# Patient Record
Sex: FEMALE | Race: WHITE | Employment: FULL TIME | URBAN - METROPOLITAN AREA
[De-identification: names, ages, dates, MRNs, and addresses within clinical notes are randomized per-mention and may not be internally consistent; named-entity substitution may affect disease eponyms.]

---

## 2017-08-29 ENCOUNTER — OFFICE VISIT (OUTPATIENT)
Dept: FAMILY MEDICINE CLINIC | Age: 47
End: 2017-08-29
Payer: COMMERCIAL

## 2017-08-29 VITALS
HEIGHT: 65 IN | DIASTOLIC BLOOD PRESSURE: 80 MMHG | WEIGHT: 130 LBS | BODY MASS INDEX: 21.66 KG/M2 | SYSTOLIC BLOOD PRESSURE: 120 MMHG

## 2017-08-29 DIAGNOSIS — R13.19 OTHER DYSPHAGIA: Primary | ICD-10-CM

## 2017-08-29 DIAGNOSIS — F41.0 SEVERE ANXIETY WITH PANIC: ICD-10-CM

## 2017-08-29 PROCEDURE — 99214 OFFICE O/P EST MOD 30 MIN: CPT | Performed by: FAMILY MEDICINE

## 2017-08-29 RX ORDER — VENLAFAXINE HYDROCHLORIDE 75 MG/1
75 CAPSULE, EXTENDED RELEASE ORAL DAILY
Qty: 90 CAPSULE | Refills: 1 | Status: SHIPPED | OUTPATIENT
Start: 2017-08-29 | End: 2017-09-05 | Stop reason: SINTOL

## 2017-08-29 RX ORDER — LORAZEPAM 0.5 MG/1
0.5 TABLET ORAL EVERY 8 HOURS PRN
Qty: 30 TABLET | Refills: 1 | Status: SHIPPED | OUTPATIENT
Start: 2017-08-29 | End: 2017-09-28

## 2017-08-29 ASSESSMENT — ENCOUNTER SYMPTOMS
GASTROINTESTINAL NEGATIVE: 1
RESPIRATORY NEGATIVE: 1

## 2017-09-05 ENCOUNTER — TELEPHONE (OUTPATIENT)
Dept: FAMILY MEDICINE CLINIC | Age: 47
End: 2017-09-05

## 2017-09-05 DIAGNOSIS — F41.9 ANXIETY: ICD-10-CM

## 2017-09-05 RX ORDER — PAROXETINE HYDROCHLORIDE 20 MG/1
20 TABLET, FILM COATED ORAL DAILY
Qty: 30 TABLET | Refills: 3 | Status: SHIPPED | OUTPATIENT
Start: 2017-09-05 | End: 2018-01-22 | Stop reason: SDUPTHER

## 2017-09-12 ENCOUNTER — OFFICE VISIT (OUTPATIENT)
Dept: FAMILY MEDICINE CLINIC | Age: 47
End: 2017-09-12
Payer: COMMERCIAL

## 2017-09-12 VITALS
WEIGHT: 139 LBS | DIASTOLIC BLOOD PRESSURE: 60 MMHG | HEIGHT: 65 IN | BODY MASS INDEX: 23.16 KG/M2 | SYSTOLIC BLOOD PRESSURE: 100 MMHG

## 2017-09-12 DIAGNOSIS — F41.0 SEVERE ANXIETY WITH PANIC: Primary | ICD-10-CM

## 2017-09-12 PROCEDURE — 99213 OFFICE O/P EST LOW 20 MIN: CPT | Performed by: FAMILY MEDICINE

## 2017-09-12 RX ORDER — QUETIAPINE FUMARATE 50 MG/1
50 TABLET, FILM COATED ORAL NIGHTLY
Qty: 30 TABLET | Refills: 3 | Status: SHIPPED | OUTPATIENT
Start: 2017-09-12 | End: 2017-10-12 | Stop reason: SDUPTHER

## 2017-09-12 ASSESSMENT — PATIENT HEALTH QUESTIONNAIRE - PHQ9
2. FEELING DOWN, DEPRESSED OR HOPELESS: 0
1. LITTLE INTEREST OR PLEASURE IN DOING THINGS: 0
SUM OF ALL RESPONSES TO PHQ QUESTIONS 1-9: 0
SUM OF ALL RESPONSES TO PHQ9 QUESTIONS 1 & 2: 0

## 2017-09-13 ASSESSMENT — ENCOUNTER SYMPTOMS
GASTROINTESTINAL NEGATIVE: 1
RESPIRATORY NEGATIVE: 1

## 2017-10-12 ENCOUNTER — OFFICE VISIT (OUTPATIENT)
Dept: FAMILY MEDICINE CLINIC | Age: 47
End: 2017-10-12
Payer: COMMERCIAL

## 2017-10-12 VITALS
HEIGHT: 65 IN | WEIGHT: 141 LBS | DIASTOLIC BLOOD PRESSURE: 70 MMHG | SYSTOLIC BLOOD PRESSURE: 110 MMHG | BODY MASS INDEX: 23.49 KG/M2

## 2017-10-12 DIAGNOSIS — F41.0 SEVERE ANXIETY WITH PANIC: Primary | ICD-10-CM

## 2017-10-12 PROCEDURE — 99213 OFFICE O/P EST LOW 20 MIN: CPT | Performed by: FAMILY MEDICINE

## 2017-10-12 RX ORDER — QUETIAPINE FUMARATE 100 MG/1
100 TABLET, FILM COATED ORAL NIGHTLY
Qty: 30 TABLET | Refills: 2 | Status: SHIPPED | OUTPATIENT
Start: 2017-10-12 | End: 2018-05-09 | Stop reason: SDUPTHER

## 2017-10-12 RX ORDER — LORAZEPAM 0.5 MG/1
TABLET ORAL
Refills: 1 | COMMUNITY
Start: 2017-09-29 | End: 2017-10-12 | Stop reason: SDUPTHER

## 2017-10-12 RX ORDER — CLONAZEPAM 0.5 MG/1
.5-1 TABLET ORAL DAILY PRN
Qty: 60 TABLET | Refills: 1 | Status: SHIPPED | OUTPATIENT
Start: 2017-10-12 | End: 2017-12-19 | Stop reason: SDUPTHER

## 2017-10-12 RX ORDER — LORAZEPAM 1 MG/1
1 TABLET ORAL EVERY 8 HOURS PRN
Qty: 60 TABLET | Refills: 1 | Status: SHIPPED | OUTPATIENT
Start: 2017-10-12 | End: 2017-12-19 | Stop reason: SDUPTHER

## 2017-10-12 NOTE — PROGRESS NOTES
Name: Ying Dominguez  : 1970         Chief Complaint:     Chief Complaint   Patient presents with    Anxiety     improvement with seroquel and paxil. History of Present Illness:      Ying Dominguez is a 52 y.o.  female who presents with Anxiety (improvement with seroquel and paxil.)      HPI     F/u anxiety. On paxil 20 daily and seroquel 50 mg qhs and is overall doing a little better. Able to swallow now. Continues to have times of panic both during the night and day. For nighttime symptoms she tried seroquel plus ativan at varied doses, and what helped most was 1 seroquel and 2 ativan. When she takes ativan during the day, it takes a long time to work and then still feels dread of wanting to escape, not wanting anyone to know about her problems. During the day finding herself counting things, which helps a little because it's a distraction. Throughout her life has used distraction techniques, which she learned as a child d/t her mom's severe anxiety that instilled panic in pt and siblings. Past Medical History:     Past Medical History:   Diagnosis Date    Anxiety         Past Surgical History:     No past surgical history on file. Medications:       Prior to Admission medications    Medication Sig Start Date End Date Taking? Authorizing Provider   QUEtiapine (SEROQUEL) 100 MG tablet Take 1 tablet by mouth nightly 10/12/17  Yes Fartun Magdaleno DO   LORazepam (ATIVAN) 1 MG tablet Take 1 tablet by mouth every 8 hours as needed for Anxiety 10/12/17  Yes Fartun Magdaleno DO   clonazePAM (KLONOPIN) 0.5 MG tablet Take 1-2 tablets by mouth daily as needed for Anxiety 10/12/17  Yes Fartun Magdaleno DO   PARoxetine (PAXIL) 20 MG tablet Take 1 tablet by mouth daily 17   Fartun Magdaleno DO        Allergies:       Effexor [venlafaxine]    Social History:     Tobacco:    reports that she has been smoking. She has been smoking about 0.50 packs per day.  She has never used smokeless 1     Sig: Take 1-2 tablets by mouth daily as needed for Anxiety       Patient Instructions     SURVEY:    You may be receiving a survey from [employer] regarding your visit today. Please complete the survey to enable us to provide the highest quality of care to you and your family. If you cannot score us as very good on any question, please call the office to discuss how we could have made your experience exceptional.     Thank you. Alberta received counseling on the following healthy behaviors: medication adherence  Reviewed prior labs and health maintenance. Continue current medications, diet and exercise. Discussed use, benefit, and side effects of prescribed medications. Barriers to medication compliance addressed. Patient given educational materials - see patient instructions. All patient questions answered. Patient voiced understanding.        Electronically signed by Phuong Shaikh DO on 10/13/2017 at 7:13 PM

## 2017-10-12 NOTE — PATIENT INSTRUCTIONS
SURVEY:    You may be receiving a survey from [employer] regarding your visit today. Please complete the survey to enable us to provide the highest quality of care to you and your family. If you cannot score us as very good on any question, please call the office to discuss how we could have made your experience exceptional.     Thank you.

## 2017-10-13 ASSESSMENT — ENCOUNTER SYMPTOMS: GASTROINTESTINAL NEGATIVE: 1

## 2017-11-13 ENCOUNTER — OFFICE VISIT (OUTPATIENT)
Dept: FAMILY MEDICINE CLINIC | Age: 47
End: 2017-11-13
Payer: COMMERCIAL

## 2017-11-13 VITALS
HEART RATE: 86 BPM | SYSTOLIC BLOOD PRESSURE: 110 MMHG | BODY MASS INDEX: 22.82 KG/M2 | WEIGHT: 142 LBS | DIASTOLIC BLOOD PRESSURE: 70 MMHG | HEIGHT: 66 IN

## 2017-11-13 DIAGNOSIS — F41.0 SEVERE ANXIETY WITH PANIC: Primary | ICD-10-CM

## 2017-11-13 PROCEDURE — 99213 OFFICE O/P EST LOW 20 MIN: CPT | Performed by: FAMILY MEDICINE

## 2017-11-13 RX ORDER — PAROXETINE HYDROCHLORIDE 20 MG/1
20 TABLET, FILM COATED ORAL DAILY
Qty: 30 TABLET | Refills: 3 | Status: CANCELLED | OUTPATIENT
Start: 2017-11-13

## 2017-11-13 RX ORDER — QUETIAPINE FUMARATE 100 MG/1
100 TABLET, FILM COATED ORAL NIGHTLY
Qty: 30 TABLET | Refills: 2 | Status: CANCELLED | OUTPATIENT
Start: 2017-11-13

## 2017-11-13 ASSESSMENT — ENCOUNTER SYMPTOMS
GASTROINTESTINAL NEGATIVE: 1
RESPIRATORY NEGATIVE: 1

## 2017-11-13 NOTE — PROGRESS NOTES
110/70   Pulse 86   Ht 5' 6\" (1.676 m)   Wt 142 lb (64.4 kg)   LMP 04/05/2014   BMI 22.92 kg/m²   Physical Exam   Constitutional: She is oriented to person, place, and time. She appears well-developed and well-nourished. No distress. Well-groomed, dressed for work, hair & makeup done   Pulmonary/Chest: Effort normal.   Neurological: She is alert and oriented to person, place, and time. Skin: Skin is warm and dry. Psychiatric: She has a normal mood and affect. Judgment normal.   Nursing note and vitals reviewed. Data:     Lab Results   Component Value Date     04/15/2014    K 3.9 04/15/2014     04/15/2014    CO2 26 04/15/2014    BUN 10 04/15/2014    CREATININE 0.72 04/15/2014    GLUCOSE 91 04/15/2014     Lab Results   Component Value Date    WBC 8.9 04/15/2014    RBC 4.78 04/15/2014    HGB 14.5 04/15/2014    HCT 42.5 04/15/2014    MCV 88.9 04/15/2014    MCH 30.4 04/15/2014    MCHC 34.2 04/15/2014    RDW 12.8 04/15/2014     04/15/2014    MPV NOT REPORTED 04/15/2014     Lab Results   Component Value Date    TSH 0.42 04/15/2014     No results found for: CHOL, HDL, PSA, LABA1C      Assessment & Plan:       1. Severe anxiety with panic     controlled on paxil, seroquel, klonopin, ativan. Cont at current doses for now. F/u 3 mos. Requested Prescriptions      No prescriptions requested or ordered in this encounter         There are no Patient Instructions on file for this visit. Alberta received counseling on the following healthy behaviors: medication adherence  Reviewed prior labs and health maintenance. Continue current medications, diet and exercise. Discussed use, benefit, and side effects of prescribed medications. Barriers to medication compliance addressed. Patient given educational materials - see patient instructions. All patient questions answered. Patient voiced understanding.          Electronically signed by Dayna Hernandez DO on 11/13/2017 at 11:00 PM

## 2017-12-19 RX ORDER — LORAZEPAM 1 MG/1
1 TABLET ORAL EVERY 8 HOURS PRN
Qty: 60 TABLET | Refills: 1 | Status: SHIPPED | OUTPATIENT
Start: 2017-12-19 | End: 2018-02-13

## 2017-12-19 RX ORDER — CLONAZEPAM 0.5 MG/1
.5-1 TABLET ORAL DAILY PRN
Qty: 60 TABLET | Refills: 1 | Status: SHIPPED | OUTPATIENT
Start: 2017-12-19 | End: 2018-02-26 | Stop reason: SDUPTHER

## 2018-01-22 DIAGNOSIS — F41.9 ANXIETY: ICD-10-CM

## 2018-01-22 RX ORDER — PAROXETINE HYDROCHLORIDE 20 MG/1
20 TABLET, FILM COATED ORAL DAILY
Qty: 90 TABLET | Refills: 1 | Status: SHIPPED | OUTPATIENT
Start: 2018-01-22 | End: 2018-05-09 | Stop reason: SDUPTHER

## 2018-02-13 ENCOUNTER — OFFICE VISIT (OUTPATIENT)
Dept: FAMILY MEDICINE CLINIC | Age: 48
End: 2018-02-13
Payer: COMMERCIAL

## 2018-02-13 VITALS
BODY MASS INDEX: 24.11 KG/M2 | WEIGHT: 150 LBS | SYSTOLIC BLOOD PRESSURE: 126 MMHG | DIASTOLIC BLOOD PRESSURE: 78 MMHG | HEIGHT: 66 IN

## 2018-02-13 DIAGNOSIS — T50.905A WEIGHT GAIN DUE TO MEDICATION: ICD-10-CM

## 2018-02-13 DIAGNOSIS — F41.0 SEVERE ANXIETY WITH PANIC: Primary | ICD-10-CM

## 2018-02-13 DIAGNOSIS — R63.5 WEIGHT GAIN DUE TO MEDICATION: ICD-10-CM

## 2018-02-13 PROCEDURE — 99213 OFFICE O/P EST LOW 20 MIN: CPT | Performed by: FAMILY MEDICINE

## 2018-02-13 RX ORDER — ALPRAZOLAM 1 MG/1
1 TABLET ORAL 3 TIMES DAILY PRN
Qty: 60 TABLET | Refills: 1 | Status: SHIPPED | OUTPATIENT
Start: 2018-02-13 | End: 2018-04-03 | Stop reason: ALTCHOICE

## 2018-02-13 ASSESSMENT — ENCOUNTER SYMPTOMS: GASTROINTESTINAL NEGATIVE: 1

## 2018-02-26 RX ORDER — CLONAZEPAM 0.5 MG/1
.5-1 TABLET ORAL DAILY PRN
Qty: 60 TABLET | Refills: 1 | Status: SHIPPED | OUTPATIENT
Start: 2018-02-26 | End: 2018-05-01 | Stop reason: SDUPTHER

## 2018-04-03 ENCOUNTER — TELEPHONE (OUTPATIENT)
Dept: FAMILY MEDICINE CLINIC | Age: 48
End: 2018-04-03

## 2018-04-03 ENCOUNTER — APPOINTMENT (OUTPATIENT)
Dept: CT IMAGING | Age: 48
End: 2018-04-03
Payer: COMMERCIAL

## 2018-04-03 ENCOUNTER — HOSPITAL ENCOUNTER (EMERGENCY)
Age: 48
Discharge: HOME | End: 2018-04-03
Attending: FAMILY MEDICINE
Payer: COMMERCIAL

## 2018-04-03 VITALS
HEART RATE: 63 BPM | TEMPERATURE: 98.1 F | OXYGEN SATURATION: 99 % | RESPIRATION RATE: 16 BRPM | HEIGHT: 66 IN | WEIGHT: 140 LBS | BODY MASS INDEX: 22.5 KG/M2 | SYSTOLIC BLOOD PRESSURE: 111 MMHG | DIASTOLIC BLOOD PRESSURE: 77 MMHG

## 2018-04-03 DIAGNOSIS — R42 DIZZINESS: Primary | ICD-10-CM

## 2018-04-03 DIAGNOSIS — M47.812 CERVICAL ARTHRITIS: ICD-10-CM

## 2018-04-03 LAB
ABSOLUTE EOS #: 0.1 K/UL (ref 0–0.4)
ABSOLUTE IMMATURE GRANULOCYTE: ABNORMAL K/UL (ref 0–0.3)
ABSOLUTE LYMPH #: 2.3 K/UL (ref 1–4.8)
ABSOLUTE MONO #: 0.3 K/UL (ref 0–1)
ANION GAP SERPL CALCULATED.3IONS-SCNC: 14 MMOL/L (ref 9–17)
BASOPHILS # BLD: 0 % (ref 0–2)
BASOPHILS ABSOLUTE: 0 K/UL (ref 0–0.2)
BUN BLDV-MCNC: 7 MG/DL (ref 6–20)
BUN/CREAT BLD: 9 (ref 9–20)
CALCIUM SERPL-MCNC: 9.7 MG/DL (ref 8.6–10.4)
CHLORIDE BLD-SCNC: 102 MMOL/L (ref 98–107)
CO2: 24 MMOL/L (ref 20–31)
CREAT SERPL-MCNC: 0.8 MG/DL (ref 0.5–0.9)
DIFFERENTIAL TYPE: YES
EKG ATRIAL RATE: 83 BPM
EKG P AXIS: 73 DEGREES
EKG P-R INTERVAL: 138 MS
EKG Q-T INTERVAL: 394 MS
EKG QRS DURATION: 136 MS
EKG QTC CALCULATION (BAZETT): 462 MS
EKG R AXIS: 77 DEGREES
EKG T AXIS: -11 DEGREES
EKG VENTRICULAR RATE: 83 BPM
EOSINOPHILS RELATIVE PERCENT: 1 % (ref 0–5)
GFR AFRICAN AMERICAN: >60 ML/MIN
GFR NON-AFRICAN AMERICAN: >60 ML/MIN
GFR SERPL CREATININE-BSD FRML MDRD: ABNORMAL ML/MIN/{1.73_M2}
GFR SERPL CREATININE-BSD FRML MDRD: ABNORMAL ML/MIN/{1.73_M2}
GLUCOSE BLD-MCNC: 101 MG/DL (ref 70–99)
HCT VFR BLD CALC: 47 % (ref 36–46)
HEMOGLOBIN: 15.8 G/DL (ref 12–16)
IMMATURE GRANULOCYTES: ABNORMAL %
LYMPHOCYTES # BLD: 34 % (ref 15–40)
MAGNESIUM: 2 MG/DL (ref 1.6–2.6)
MCH RBC QN AUTO: 29.7 PG (ref 26–34)
MCHC RBC AUTO-ENTMCNC: 33.7 G/DL (ref 31–37)
MCV RBC AUTO: 88.2 FL (ref 80–100)
MONOCYTES # BLD: 4 % (ref 4–8)
NRBC AUTOMATED: ABNORMAL PER 100 WBC
PDW BLD-RTO: 12.7 % (ref 12.1–15.2)
PLATELET # BLD: 326 K/UL (ref 140–450)
PLATELET ESTIMATE: ABNORMAL
PMV BLD AUTO: ABNORMAL FL (ref 6–12)
POTASSIUM SERPL-SCNC: 4 MMOL/L (ref 3.7–5.3)
RBC # BLD: 5.33 M/UL (ref 4–5.2)
RBC # BLD: ABNORMAL 10*6/UL
SEG NEUTROPHILS: 61 % (ref 47–75)
SEGMENTED NEUTROPHILS ABSOLUTE COUNT: 4 K/UL (ref 2.5–7)
SODIUM BLD-SCNC: 140 MMOL/L (ref 135–144)
TROPONIN INTERP: NORMAL
TROPONIN T: <0.03 NG/ML
TSH SERPL DL<=0.05 MIU/L-ACNC: 0.56 MIU/L (ref 0.3–5)
WBC # BLD: 6.6 K/UL (ref 3.5–11)
WBC # BLD: ABNORMAL 10*3/UL

## 2018-04-03 PROCEDURE — 93005 ELECTROCARDIOGRAM TRACING: CPT

## 2018-04-03 PROCEDURE — 70450 CT HEAD/BRAIN W/O DYE: CPT

## 2018-04-03 PROCEDURE — 72125 CT NECK SPINE W/O DYE: CPT

## 2018-04-03 PROCEDURE — 84443 ASSAY THYROID STIM HORMONE: CPT

## 2018-04-03 PROCEDURE — 99284 EMERGENCY DEPT VISIT MOD MDM: CPT

## 2018-04-03 PROCEDURE — 6370000000 HC RX 637 (ALT 250 FOR IP): Performed by: FAMILY MEDICINE

## 2018-04-03 PROCEDURE — 83735 ASSAY OF MAGNESIUM: CPT

## 2018-04-03 PROCEDURE — 80048 BASIC METABOLIC PNL TOTAL CA: CPT

## 2018-04-03 PROCEDURE — 84484 ASSAY OF TROPONIN QUANT: CPT

## 2018-04-03 PROCEDURE — 36415 COLL VENOUS BLD VENIPUNCTURE: CPT

## 2018-04-03 PROCEDURE — 85025 COMPLETE CBC W/AUTO DIFF WBC: CPT

## 2018-04-03 RX ORDER — MECLIZINE HCL 12.5 MG/1
25 TABLET ORAL ONCE
Status: COMPLETED | OUTPATIENT
Start: 2018-04-03 | End: 2018-04-03

## 2018-04-03 RX ORDER — MECLIZINE HYDROCHLORIDE 25 MG/1
25 TABLET ORAL 3 TIMES DAILY PRN
Qty: 60 TABLET | Refills: 0 | Status: SHIPPED | OUTPATIENT
Start: 2018-04-03 | End: 2018-04-13

## 2018-04-03 RX ORDER — NAPROXEN 500 MG/1
500 TABLET ORAL ONCE
Status: COMPLETED | OUTPATIENT
Start: 2018-04-03 | End: 2018-04-03

## 2018-04-03 RX ORDER — NAPROXEN 500 MG/1
500 TABLET ORAL 2 TIMES DAILY WITH MEALS
Status: DISCONTINUED | OUTPATIENT
Start: 2018-04-03 | End: 2018-04-03

## 2018-04-03 RX ADMIN — MECLIZINE 25 MG: 12.5 TABLET ORAL at 14:31

## 2018-04-03 RX ADMIN — NAPROXEN 500 MG: 500 TABLET, DELAYED RELEASE ORAL at 13:41

## 2018-04-03 ASSESSMENT — PAIN SCALES - GENERAL: PAINLEVEL_OUTOF10: 4

## 2018-04-04 ENCOUNTER — OFFICE VISIT (OUTPATIENT)
Dept: FAMILY MEDICINE CLINIC | Age: 48
End: 2018-04-04
Payer: COMMERCIAL

## 2018-04-04 ENCOUNTER — HOSPITAL ENCOUNTER (OUTPATIENT)
Age: 48
Discharge: HOME | End: 2018-04-04
Payer: COMMERCIAL

## 2018-04-04 VITALS
OXYGEN SATURATION: 98 % | HEART RATE: 87 BPM | BODY MASS INDEX: 23.95 KG/M2 | SYSTOLIC BLOOD PRESSURE: 110 MMHG | WEIGHT: 149 LBS | HEIGHT: 66 IN | DIASTOLIC BLOOD PRESSURE: 74 MMHG

## 2018-04-04 DIAGNOSIS — I45.10 RBBB: ICD-10-CM

## 2018-04-04 DIAGNOSIS — R51.9 NONINTRACTABLE EPISODIC HEADACHE, UNSPECIFIED HEADACHE TYPE: ICD-10-CM

## 2018-04-04 DIAGNOSIS — M25.50 POLYARTHRALGIA: ICD-10-CM

## 2018-04-04 DIAGNOSIS — Z13.6 SCREENING FOR CARDIOVASCULAR CONDITION: ICD-10-CM

## 2018-04-04 DIAGNOSIS — R42 DIZZINESS: Primary | ICD-10-CM

## 2018-04-04 DIAGNOSIS — R42 DIZZINESS: ICD-10-CM

## 2018-04-04 LAB
C-REACTIVE PROTEIN: <0.3 MG/L (ref 0–5)
CHOLESTEROL/HDL RATIO: 3.4
CHOLESTEROL: 233 MG/DL
HDLC SERPL-MCNC: 69 MG/DL
LDL CHOLESTEROL: 139 MG/DL (ref 0–130)
MAGNESIUM: 2.1 MG/DL (ref 1.6–2.6)
RHEUMATOID FACTOR: <10 IU/ML
SEDIMENTATION RATE, ERYTHROCYTE: 5 MM (ref 0–30)
TRIGL SERPL-MCNC: 125 MG/DL
VLDLC SERPL CALC-MCNC: ABNORMAL MG/DL (ref 1–30)

## 2018-04-04 PROCEDURE — 80061 LIPID PANEL: CPT

## 2018-04-04 PROCEDURE — 86038 ANTINUCLEAR ANTIBODIES: CPT

## 2018-04-04 PROCEDURE — 86140 C-REACTIVE PROTEIN: CPT

## 2018-04-04 PROCEDURE — 86431 RHEUMATOID FACTOR QUANT: CPT

## 2018-04-04 PROCEDURE — 36415 COLL VENOUS BLD VENIPUNCTURE: CPT

## 2018-04-04 PROCEDURE — 83735 ASSAY OF MAGNESIUM: CPT

## 2018-04-04 PROCEDURE — 85651 RBC SED RATE NONAUTOMATED: CPT

## 2018-04-04 PROCEDURE — 99214 OFFICE O/P EST MOD 30 MIN: CPT | Performed by: FAMILY MEDICINE

## 2018-04-04 ASSESSMENT — ENCOUNTER SYMPTOMS: RESPIRATORY NEGATIVE: 1

## 2018-04-04 ASSESSMENT — PATIENT HEALTH QUESTIONNAIRE - PHQ9
2. FEELING DOWN, DEPRESSED OR HOPELESS: 0
SUM OF ALL RESPONSES TO PHQ QUESTIONS 1-9: 0
SUM OF ALL RESPONSES TO PHQ9 QUESTIONS 1 & 2: 0
1. LITTLE INTEREST OR PLEASURE IN DOING THINGS: 0

## 2018-04-05 ENCOUNTER — TELEPHONE (OUTPATIENT)
Dept: FAMILY MEDICINE CLINIC | Age: 48
End: 2018-04-05

## 2018-04-05 LAB — ANTI-NUCLEAR ANTIBODY (ANA): NEGATIVE

## 2018-04-09 ENCOUNTER — TELEPHONE (OUTPATIENT)
Dept: FAMILY MEDICINE CLINIC | Age: 48
End: 2018-04-09

## 2018-04-09 ENCOUNTER — HOSPITAL ENCOUNTER (OUTPATIENT)
Dept: NON INVASIVE DIAGNOSTICS | Age: 48
Discharge: HOME | End: 2018-04-09
Payer: COMMERCIAL

## 2018-04-09 DIAGNOSIS — I45.10 RBBB: ICD-10-CM

## 2018-04-09 LAB
LV EF: 60 %
LVEF MODALITY: NORMAL

## 2018-04-09 PROCEDURE — 93306 TTE W/DOPPLER COMPLETE: CPT

## 2018-04-09 RX ORDER — LORAZEPAM 1 MG/1
1 TABLET ORAL EVERY 8 HOURS PRN
Qty: 60 TABLET | Refills: 1 | Status: SHIPPED | OUTPATIENT
Start: 2018-04-09 | End: 2018-06-22 | Stop reason: SDUPTHER

## 2018-04-10 ENCOUNTER — TELEPHONE (OUTPATIENT)
Dept: FAMILY MEDICINE CLINIC | Age: 48
End: 2018-04-10

## 2018-04-10 DIAGNOSIS — I45.10 RBBB: Primary | ICD-10-CM

## 2018-04-12 ENCOUNTER — TELEPHONE (OUTPATIENT)
Dept: CARDIOLOGY CLINIC | Age: 48
End: 2018-04-12

## 2018-04-12 DIAGNOSIS — R42 DIZZINESS: ICD-10-CM

## 2018-04-12 DIAGNOSIS — R42 LIGHTHEADEDNESS: ICD-10-CM

## 2018-04-12 DIAGNOSIS — R94.31 ABNORMAL EKG: Primary | ICD-10-CM

## 2018-04-18 ENCOUNTER — HOSPITAL ENCOUNTER (OUTPATIENT)
Dept: GENERAL RADIOLOGY | Age: 48
Discharge: HOME | End: 2018-04-20
Payer: COMMERCIAL

## 2018-04-18 ENCOUNTER — HOSPITAL ENCOUNTER (OUTPATIENT)
Age: 48
Discharge: HOME | End: 2018-04-20
Payer: COMMERCIAL

## 2018-04-18 DIAGNOSIS — R42 DIZZINESS: ICD-10-CM

## 2018-04-18 DIAGNOSIS — R94.31 ABNORMAL EKG: ICD-10-CM

## 2018-04-18 DIAGNOSIS — R42 LIGHTHEADEDNESS: ICD-10-CM

## 2018-04-18 PROCEDURE — 71046 X-RAY EXAM CHEST 2 VIEWS: CPT

## 2018-04-20 ENCOUNTER — OFFICE VISIT (OUTPATIENT)
Dept: CARDIOLOGY CLINIC | Age: 48
End: 2018-04-20
Payer: COMMERCIAL

## 2018-04-20 VITALS
WEIGHT: 152 LBS | BODY MASS INDEX: 24.53 KG/M2 | SYSTOLIC BLOOD PRESSURE: 120 MMHG | OXYGEN SATURATION: 99 % | HEART RATE: 90 BPM | DIASTOLIC BLOOD PRESSURE: 80 MMHG

## 2018-04-20 DIAGNOSIS — R07.89 CHEST DISCOMFORT: ICD-10-CM

## 2018-04-20 DIAGNOSIS — R42 DIZZINESS: ICD-10-CM

## 2018-04-20 DIAGNOSIS — R94.31 ABNORMAL EKG: Primary | ICD-10-CM

## 2018-04-20 PROCEDURE — 99204 OFFICE O/P NEW MOD 45 MIN: CPT | Performed by: INTERNAL MEDICINE

## 2018-04-30 ENCOUNTER — HOSPITAL ENCOUNTER (OUTPATIENT)
Dept: NUCLEAR MEDICINE | Age: 48
Discharge: HOME | End: 2018-05-02
Payer: COMMERCIAL

## 2018-04-30 ENCOUNTER — HOSPITAL ENCOUNTER (OUTPATIENT)
Dept: NON INVASIVE DIAGNOSTICS | Age: 48
Discharge: HOME | End: 2018-04-30
Payer: COMMERCIAL

## 2018-04-30 ENCOUNTER — TELEPHONE (OUTPATIENT)
Dept: FAMILY MEDICINE CLINIC | Age: 48
End: 2018-04-30

## 2018-04-30 DIAGNOSIS — R07.89 CHEST DISCOMFORT: ICD-10-CM

## 2018-04-30 DIAGNOSIS — R42 DIZZINESS: ICD-10-CM

## 2018-04-30 DIAGNOSIS — R94.31 ABNORMAL EKG: ICD-10-CM

## 2018-04-30 PROCEDURE — A9500 TC99M SESTAMIBI: HCPCS | Performed by: INTERNAL MEDICINE

## 2018-04-30 PROCEDURE — 93017 CV STRESS TEST TRACING ONLY: CPT

## 2018-04-30 PROCEDURE — 93270 REMOTE 30 DAY ECG REV/REPORT: CPT

## 2018-04-30 PROCEDURE — 78452 HT MUSCLE IMAGE SPECT MULT: CPT

## 2018-04-30 PROCEDURE — 3430000000 HC RX DIAGNOSTIC RADIOPHARMACEUTICAL: Performed by: INTERNAL MEDICINE

## 2018-04-30 RX ADMIN — TETRAKIS(2-METHOXYISOBUTYLISOCYANIDE)COPPER(I) TETRAFLUOROBORATE 30 MILLICURIE: 1 INJECTION, POWDER, LYOPHILIZED, FOR SOLUTION INTRAVENOUS at 09:35

## 2018-04-30 RX ADMIN — TETRAKIS(2-METHOXYISOBUTYLISOCYANIDE)COPPER(I) TETRAFLUOROBORATE 10 MILLICURIE: 1 INJECTION, POWDER, LYOPHILIZED, FOR SOLUTION INTRAVENOUS at 08:00

## 2018-05-01 RX ORDER — CLONAZEPAM 0.5 MG/1
.5-1 TABLET ORAL DAILY PRN
Qty: 60 TABLET | Refills: 1 | Status: SHIPPED | OUTPATIENT
Start: 2018-05-01 | End: 2018-07-31 | Stop reason: SDUPTHER

## 2018-05-03 ENCOUNTER — TELEPHONE (OUTPATIENT)
Dept: CARDIOLOGY CLINIC | Age: 48
End: 2018-05-03

## 2018-05-09 ENCOUNTER — APPOINTMENT (OUTPATIENT)
Dept: GENERAL RADIOLOGY | Age: 48
End: 2018-05-09
Payer: COMMERCIAL

## 2018-05-09 ENCOUNTER — HOSPITAL ENCOUNTER (EMERGENCY)
Age: 48
Discharge: HOME | End: 2018-05-10
Attending: EMERGENCY MEDICINE
Payer: COMMERCIAL

## 2018-05-09 ENCOUNTER — OFFICE VISIT (OUTPATIENT)
Dept: FAMILY MEDICINE CLINIC | Age: 48
End: 2018-05-09
Payer: COMMERCIAL

## 2018-05-09 VITALS
HEIGHT: 66 IN | RESPIRATION RATE: 16 BRPM | HEART RATE: 63 BPM | WEIGHT: 150 LBS | BODY MASS INDEX: 24.11 KG/M2 | DIASTOLIC BLOOD PRESSURE: 87 MMHG | SYSTOLIC BLOOD PRESSURE: 118 MMHG | OXYGEN SATURATION: 100 % | TEMPERATURE: 97.8 F

## 2018-05-09 VITALS
DIASTOLIC BLOOD PRESSURE: 60 MMHG | WEIGHT: 150 LBS | BODY MASS INDEX: 24.11 KG/M2 | HEIGHT: 66 IN | SYSTOLIC BLOOD PRESSURE: 120 MMHG

## 2018-05-09 DIAGNOSIS — S86.911A KNEE STRAIN, RIGHT, INITIAL ENCOUNTER: Primary | ICD-10-CM

## 2018-05-09 DIAGNOSIS — F41.0 SEVERE ANXIETY WITH PANIC: ICD-10-CM

## 2018-05-09 DIAGNOSIS — R42 INTERMITTENT LIGHTHEADEDNESS: Primary | ICD-10-CM

## 2018-05-09 PROCEDURE — 73564 X-RAY EXAM KNEE 4 OR MORE: CPT

## 2018-05-09 PROCEDURE — 99283 EMERGENCY DEPT VISIT LOW MDM: CPT

## 2018-05-09 PROCEDURE — 99213 OFFICE O/P EST LOW 20 MIN: CPT | Performed by: FAMILY MEDICINE

## 2018-05-09 PROCEDURE — 6370000000 HC RX 637 (ALT 250 FOR IP): Performed by: EMERGENCY MEDICINE

## 2018-05-09 RX ORDER — HYDROCODONE BITARTRATE AND ACETAMINOPHEN 5; 325 MG/1; MG/1
1 TABLET ORAL ONCE
Status: COMPLETED | OUTPATIENT
Start: 2018-05-09 | End: 2018-05-09

## 2018-05-09 RX ORDER — QUETIAPINE FUMARATE 50 MG/1
50 TABLET, FILM COATED ORAL NIGHTLY
Qty: 90 TABLET | Refills: 1 | Status: SHIPPED | OUTPATIENT
Start: 2018-05-09 | End: 2018-09-18 | Stop reason: SDUPTHER

## 2018-05-09 RX ORDER — PAROXETINE HYDROCHLORIDE 20 MG/1
20 TABLET, FILM COATED ORAL DAILY
Qty: 90 TABLET | Refills: 1 | Status: SHIPPED | OUTPATIENT
Start: 2018-05-09 | End: 2018-09-18 | Stop reason: SDUPTHER

## 2018-05-09 RX ADMIN — HYDROCODONE BITARTRATE AND ACETAMINOPHEN 1 TABLET: 5; 325 TABLET ORAL at 22:55

## 2018-05-09 ASSESSMENT — ENCOUNTER SYMPTOMS
GASTROINTESTINAL NEGATIVE: 1
RESPIRATORY NEGATIVE: 1

## 2018-05-09 ASSESSMENT — PATIENT HEALTH QUESTIONNAIRE - PHQ9
1. LITTLE INTEREST OR PLEASURE IN DOING THINGS: 0
SUM OF ALL RESPONSES TO PHQ QUESTIONS 1-9: 0
SUM OF ALL RESPONSES TO PHQ9 QUESTIONS 1 & 2: 0
2. FEELING DOWN, DEPRESSED OR HOPELESS: 0

## 2018-05-09 ASSESSMENT — PAIN SCALES - GENERAL
PAINLEVEL_OUTOF10: 6
PAINLEVEL_OUTOF10: 4

## 2018-05-09 ASSESSMENT — PAIN DESCRIPTION - FREQUENCY: FREQUENCY: CONTINUOUS

## 2018-05-09 ASSESSMENT — PAIN DESCRIPTION - ORIENTATION: ORIENTATION: RIGHT

## 2018-05-09 ASSESSMENT — PAIN DESCRIPTION - PROGRESSION: CLINICAL_PROGRESSION: NOT CHANGED

## 2018-05-09 ASSESSMENT — PAIN DESCRIPTION - PAIN TYPE: TYPE: ACUTE PAIN

## 2018-05-09 ASSESSMENT — PAIN DESCRIPTION - DESCRIPTORS: DESCRIPTORS: THROBBING

## 2018-05-09 ASSESSMENT — PAIN DESCRIPTION - LOCATION: LOCATION: KNEE

## 2018-05-10 ENCOUNTER — TELEPHONE (OUTPATIENT)
Dept: FAMILY MEDICINE CLINIC | Age: 48
End: 2018-05-10

## 2018-05-10 DIAGNOSIS — M25.561 ACUTE PAIN OF RIGHT KNEE: Primary | ICD-10-CM

## 2018-05-11 ENCOUNTER — OFFICE VISIT (OUTPATIENT)
Dept: FAMILY MEDICINE CLINIC | Age: 48
End: 2018-05-11
Payer: COMMERCIAL

## 2018-05-11 VITALS
BODY MASS INDEX: 25.61 KG/M2 | DIASTOLIC BLOOD PRESSURE: 80 MMHG | HEART RATE: 78 BPM | SYSTOLIC BLOOD PRESSURE: 132 MMHG | HEIGHT: 64 IN | WEIGHT: 150 LBS | OXYGEN SATURATION: 98 %

## 2018-05-11 DIAGNOSIS — M25.561 ACUTE PAIN OF RIGHT KNEE: Primary | ICD-10-CM

## 2018-05-11 PROCEDURE — 99213 OFFICE O/P EST LOW 20 MIN: CPT | Performed by: FAMILY MEDICINE

## 2018-05-11 RX ORDER — HYDROCODONE BITARTRATE AND ACETAMINOPHEN 5; 325 MG/1; MG/1
1 TABLET ORAL EVERY 6 HOURS PRN
Qty: 20 TABLET | Refills: 0 | Status: SHIPPED | OUTPATIENT
Start: 2018-05-11 | End: 2018-11-16 | Stop reason: SDUPTHER

## 2018-05-20 ASSESSMENT — ENCOUNTER SYMPTOMS
COLOR CHANGE: 0
CONSTIPATION: 0
DIARRHEA: 0
SHORTNESS OF BREATH: 0
COUGH: 0
NAUSEA: 0
BACK PAIN: 0
TROUBLE SWALLOWING: 0
VOMITING: 0
WHEEZING: 0
ABDOMINAL PAIN: 0
ABDOMINAL DISTENTION: 0
PHOTOPHOBIA: 0

## 2018-06-05 ENCOUNTER — TELEPHONE (OUTPATIENT)
Dept: CARDIOLOGY CLINIC | Age: 48
End: 2018-06-05

## 2018-06-22 DIAGNOSIS — F41.9 ANXIETY: Primary | ICD-10-CM

## 2018-06-22 RX ORDER — LORAZEPAM 1 MG/1
1 TABLET ORAL EVERY 8 HOURS PRN
Qty: 60 TABLET | Refills: 1 | Status: SHIPPED | OUTPATIENT
Start: 2018-06-22 | End: 2018-08-31 | Stop reason: SDUPTHER

## 2018-07-31 DIAGNOSIS — F41.9 ANXIETY: Primary | ICD-10-CM

## 2018-07-31 RX ORDER — CLONAZEPAM 0.5 MG/1
.5-1 TABLET ORAL DAILY PRN
Qty: 60 TABLET | Refills: 1 | Status: SHIPPED | OUTPATIENT
Start: 2018-07-31 | End: 2018-10-15 | Stop reason: SDUPTHER

## 2018-07-31 NOTE — TELEPHONE ENCOUNTER
Patient is asking for a refill on Clonazepam 0.5 mg    Drug [address] Maintenance   Topic Date Due    HIV screen  07/21/1985    DTaP/Tdap/Td vaccine (1 - Tdap) 07/21/1989    Pneumococcal med risk (1 of 1 - PPSV23) 07/21/1989    Cervical cancer screen  07/21/1991    Diabetes screen  07/21/2010    Flu vaccine (1) 11/13/2018 (Originally 9/1/2018)    Lipid screen  04/04/2023             (applicable per patient's age: Cancer Screenings, Depression Screening, Fall Risk Screening, Immunizations)    LDL Cholesterol (mg/dL)   Date Value   04/04/2018 139 (H)     BUN (mg/dL)   Date Value   04/03/2018 7      (goal A1C is < 7)   (goal LDL is <100) need 30-50% reduction from baseline     BP Readings from Last 3 Encounters:   05/11/18 132/80   05/09/18 118/87   05/09/18 120/60    (goal /80)      All Future Testing planned in [facility]:  Lab Frequency Next Occurrence   Glucose, Fasting Once 05/08/2019       Next Visit Date:  Future Appointments  Date Time Provider Dima Elaine   9/11/2018 11:00 AM DO Krystal Giron [facility]            Patient Active Problem List:     OCD (obsessive compulsive disorder)     Anxiety     Fatigue     Primary fibromyalgia syndrome

## 2018-08-31 DIAGNOSIS — F41.9 ANXIETY: ICD-10-CM

## 2018-08-31 RX ORDER — LORAZEPAM 1 MG/1
1 TABLET ORAL EVERY 8 HOURS PRN
Qty: 60 TABLET | Refills: 1 | Status: SHIPPED | OUTPATIENT
Start: 2018-08-31 | End: 2018-11-05 | Stop reason: SDUPTHER

## 2018-09-18 ENCOUNTER — OFFICE VISIT (OUTPATIENT)
Dept: FAMILY MEDICINE CLINIC | Age: 48
End: 2018-09-18
Payer: COMMERCIAL

## 2018-09-18 VITALS
HEIGHT: 64 IN | BODY MASS INDEX: 24.92 KG/M2 | DIASTOLIC BLOOD PRESSURE: 72 MMHG | WEIGHT: 146 LBS | SYSTOLIC BLOOD PRESSURE: 110 MMHG

## 2018-09-18 DIAGNOSIS — F41.0 SEVERE ANXIETY WITH PANIC: Primary | ICD-10-CM

## 2018-09-18 DIAGNOSIS — F40.243 FEAR OF FLYING: ICD-10-CM

## 2018-09-18 PROCEDURE — 99213 OFFICE O/P EST LOW 20 MIN: CPT | Performed by: FAMILY MEDICINE

## 2018-09-18 RX ORDER — QUETIAPINE FUMARATE 50 MG/1
50 TABLET, FILM COATED ORAL NIGHTLY
Qty: 90 TABLET | Refills: 1 | Status: SHIPPED | OUTPATIENT
Start: 2018-09-18 | End: 2019-02-26 | Stop reason: SDUPTHER

## 2018-09-18 RX ORDER — PAROXETINE HYDROCHLORIDE 20 MG/1
20 TABLET, FILM COATED ORAL DAILY
Qty: 90 TABLET | Refills: 1 | Status: SHIPPED | OUTPATIENT
Start: 2018-09-18 | End: 2019-05-10 | Stop reason: SDUPTHER

## 2018-09-18 NOTE — PROGRESS NOTES
Negative. Cardiovascular: Negative. Physical Exam:     Vitals:  /72   Ht 5' 4\" (1.626 m)   Wt 146 lb (66.2 kg)   LMP 04/05/2014   BMI 25.06 kg/m²   Physical Exam   Constitutional: She is oriented to person, place, and time. She appears well-developed and well-nourished. No distress. Pulmonary/Chest: Effort normal.   Neurological: She is alert and oriented to person, place, and time. Skin: Skin is warm and dry. Psychiatric: She has a normal mood and affect. Judgment normal.   Nursing note and vitals reviewed. Data:     Lab Results   Component Value Date     04/03/2018    K 4.0 04/03/2018     04/03/2018    CO2 24 04/03/2018    BUN 7 04/03/2018    CREATININE 0.80 04/03/2018    GLUCOSE 101 04/03/2018     Lab Results   Component Value Date    WBC 6.6 04/03/2018    RBC 5.33 04/03/2018    HGB 15.8 04/03/2018    HCT 47.0 04/03/2018    MCV 88.2 04/03/2018    MCH 29.7 04/03/2018    MCHC 33.7 04/03/2018    RDW 12.7 04/03/2018     04/03/2018    MPV NOT REPORTED 04/03/2018     Lab Results   Component Value Date    TSH 0.56 04/03/2018     Lab Results   Component Value Date    CHOL 233 04/04/2018    HDL 69 04/04/2018         Assessment & Plan:        Diagnosis Orders   1. Severe anxiety with panic     2. Fear of flying     anxiety overall controlled on current meds. Continue same. May increase ativan dose for flights, advised 1 mg before trip to airport (not driving), another 2 mg just prior to flight, and have another 1 mg pill in her pocket in case of panic attack on the flight. No alcohol along with all of this. Pt very tolerant of benzos.       Requested Prescriptions     Signed Prescriptions Disp Refills    QUEtiapine (SEROQUEL) 50 MG tablet 90 tablet 1     Sig: Take 1 tablet by mouth nightly    PARoxetine (PAXIL) 20 MG tablet 90 tablet 1     Sig: Take 1 tablet by mouth daily       Patient Instructions     SURVEY:    You may be receiving a survey from [employer] regarding your visit today. Please complete the survey to enable us to provide the highest quality of care to you and your family. If you cannot score us as very good on any question, please call the office to discuss how we could have made your experience exceptional.     Thank you. Alberta received counseling on the following healthy behaviors: medication adherence  Reviewed prior labs and health maintenance. Continue current medications, diet and exercise. Discussed use, benefit, and side effects of prescribed medications. Barriers to medication compliance addressed. Patient given educational materials - see patient instructions. All patient questions answered. Patient voiced understanding.      Electronically signed by Jami Katz DO on 9/20/2018 at 3:43 PM   [address] [address]  Dept: [phone]

## 2018-09-20 ASSESSMENT — ENCOUNTER SYMPTOMS: RESPIRATORY NEGATIVE: 1

## 2018-10-15 DIAGNOSIS — F41.9 ANXIETY: ICD-10-CM

## 2018-10-15 RX ORDER — CLONAZEPAM 0.5 MG/1
.5-1 TABLET ORAL DAILY PRN
Qty: 60 TABLET | Refills: 1 | Status: SHIPPED | OUTPATIENT
Start: 2018-10-15 | End: 2019-01-09 | Stop reason: SDUPTHER

## 2018-10-24 ENCOUNTER — TELEPHONE (OUTPATIENT)
Dept: FAMILY MEDICINE CLINIC | Age: 48
End: 2018-10-24

## 2018-10-24 DIAGNOSIS — F40.243 FEAR OF FLYING: Primary | ICD-10-CM

## 2018-10-24 RX ORDER — LORAZEPAM 1 MG/1
TABLET ORAL
Qty: 8 TABLET | Refills: 0 | Status: SHIPPED | OUTPATIENT
Start: 2018-10-24 | End: 2018-10-31

## 2018-11-05 DIAGNOSIS — F41.9 ANXIETY: ICD-10-CM

## 2018-11-05 RX ORDER — LORAZEPAM 1 MG/1
1 TABLET ORAL EVERY 8 HOURS PRN
Qty: 60 TABLET | Refills: 1 | Status: SHIPPED | OUTPATIENT
Start: 2018-11-05 | End: 2019-01-09 | Stop reason: SDUPTHER

## 2018-11-05 NOTE — TELEPHONE ENCOUNTER
Last OV 9/18/18 for anxiety  Requesting refill on lorazepam 1mg  Last Rx was for #60 x1 on 8/31/18  Rx pending if ok. There was an rx written on 10/24/18 for 8 tablets because of flying.

## 2018-11-16 ENCOUNTER — TELEPHONE (OUTPATIENT)
Dept: FAMILY MEDICINE CLINIC | Age: 48
End: 2018-11-16

## 2018-11-16 DIAGNOSIS — M25.561 ACUTE PAIN OF RIGHT KNEE: ICD-10-CM

## 2018-11-16 RX ORDER — HYDROCODONE BITARTRATE AND ACETAMINOPHEN 5; 325 MG/1; MG/1
1 TABLET ORAL EVERY 6 HOURS PRN
Qty: 10 TABLET | Refills: 0 | Status: SHIPPED | OUTPATIENT
Start: 2018-11-16 | End: 2018-11-21

## 2018-11-16 NOTE — TELEPHONE ENCOUNTER
rx sent    Controlled Substances Monitoring:     RX Monitoring 11/16/2018   Attestation The Prescription Monitoring Report for this patient was reviewed today. Documentation No signs of potential drug abuse or diversion identified.

## 2019-01-09 DIAGNOSIS — F41.9 ANXIETY: ICD-10-CM

## 2019-01-09 RX ORDER — LORAZEPAM 1 MG/1
1 TABLET ORAL EVERY 8 HOURS PRN
Qty: 60 TABLET | Refills: 1 | Status: SHIPPED | OUTPATIENT
Start: 2019-01-09 | End: 2019-03-08 | Stop reason: SDUPTHER

## 2019-01-09 RX ORDER — CLONAZEPAM 0.5 MG/1
.5-1 TABLET ORAL DAILY PRN
Qty: 60 TABLET | Refills: 1 | Status: SHIPPED | OUTPATIENT
Start: 2019-01-09 | End: 2019-03-08 | Stop reason: SDUPTHER

## 2019-02-26 ENCOUNTER — OFFICE VISIT (OUTPATIENT)
Dept: FAMILY MEDICINE CLINIC | Age: 49
End: 2019-02-26
Payer: COMMERCIAL

## 2019-02-26 VITALS
WEIGHT: 146 LBS | HEIGHT: 66 IN | SYSTOLIC BLOOD PRESSURE: 102 MMHG | HEART RATE: 78 BPM | BODY MASS INDEX: 23.46 KG/M2 | DIASTOLIC BLOOD PRESSURE: 60 MMHG | OXYGEN SATURATION: 99 %

## 2019-02-26 DIAGNOSIS — F41.9 ANXIETY: ICD-10-CM

## 2019-02-26 DIAGNOSIS — F42.9 OBSESSIVE-COMPULSIVE DISORDER, UNSPECIFIED TYPE: ICD-10-CM

## 2019-02-26 DIAGNOSIS — F43.9 STRESS: ICD-10-CM

## 2019-02-26 DIAGNOSIS — R68.89 FORGETFULNESS: Primary | ICD-10-CM

## 2019-02-26 PROCEDURE — 99214 OFFICE O/P EST MOD 30 MIN: CPT | Performed by: FAMILY MEDICINE

## 2019-02-26 RX ORDER — QUETIAPINE FUMARATE 50 MG/1
75 TABLET, FILM COATED ORAL NIGHTLY
Qty: 90 TABLET | Refills: 1
Start: 2019-02-26 | End: 2019-05-10 | Stop reason: SDUPTHER

## 2019-02-26 ASSESSMENT — PATIENT HEALTH QUESTIONNAIRE - PHQ9
SUM OF ALL RESPONSES TO PHQ QUESTIONS 1-9: 2
2. FEELING DOWN, DEPRESSED OR HOPELESS: 1
1. LITTLE INTEREST OR PLEASURE IN DOING THINGS: 1
SUM OF ALL RESPONSES TO PHQ9 QUESTIONS 1 & 2: 2
SUM OF ALL RESPONSES TO PHQ QUESTIONS 1-9: 2

## 2019-03-08 DIAGNOSIS — F41.9 ANXIETY: ICD-10-CM

## 2019-03-08 RX ORDER — CLONAZEPAM 0.5 MG/1
.5-1 TABLET ORAL DAILY PRN
Qty: 60 TABLET | Refills: 1 | Status: SHIPPED | OUTPATIENT
Start: 2019-03-08 | End: 2019-05-10 | Stop reason: SDUPTHER

## 2019-03-08 RX ORDER — LORAZEPAM 1 MG/1
1 TABLET ORAL EVERY 8 HOURS PRN
Qty: 60 TABLET | Refills: 1 | Status: SHIPPED | OUTPATIENT
Start: 2019-03-08 | End: 2019-05-10 | Stop reason: SDUPTHER

## 2019-05-10 DIAGNOSIS — F41.9 ANXIETY: ICD-10-CM

## 2019-05-10 RX ORDER — LORAZEPAM 1 MG/1
1 TABLET ORAL EVERY 8 HOURS PRN
Qty: 60 TABLET | Refills: 1 | Status: SHIPPED | OUTPATIENT
Start: 2019-05-10 | End: 2019-07-11 | Stop reason: SDUPTHER

## 2019-05-10 RX ORDER — CLONAZEPAM 0.5 MG/1
.5-1 TABLET ORAL DAILY PRN
Qty: 60 TABLET | Refills: 1 | Status: SHIPPED | OUTPATIENT
Start: 2019-05-10 | End: 2019-07-11 | Stop reason: SDUPTHER

## 2019-05-10 RX ORDER — PAROXETINE HYDROCHLORIDE 20 MG/1
20 TABLET, FILM COATED ORAL DAILY
Qty: 90 TABLET | Refills: 1 | Status: SHIPPED | OUTPATIENT
Start: 2019-05-10 | End: 2019-11-15 | Stop reason: SDUPTHER

## 2019-05-10 RX ORDER — QUETIAPINE FUMARATE 50 MG/1
75 TABLET, FILM COATED ORAL NIGHTLY
Qty: 135 TABLET | Refills: 1 | Status: SHIPPED | OUTPATIENT
Start: 2019-05-10 | End: 2019-11-15 | Stop reason: SDUPTHER

## 2019-07-11 DIAGNOSIS — F41.9 ANXIETY: ICD-10-CM

## 2019-07-11 RX ORDER — LORAZEPAM 1 MG/1
1 TABLET ORAL EVERY 8 HOURS PRN
Qty: 60 TABLET | Refills: 1 | Status: SHIPPED | OUTPATIENT
Start: 2019-07-11 | End: 2019-09-13 | Stop reason: SDUPTHER

## 2019-07-11 RX ORDER — CLONAZEPAM 0.5 MG/1
.5-1 TABLET ORAL DAILY PRN
Qty: 60 TABLET | Refills: 1 | Status: SHIPPED | OUTPATIENT
Start: 2019-07-11 | End: 2019-09-13 | Stop reason: SDUPTHER

## 2019-09-13 DIAGNOSIS — F41.9 ANXIETY: ICD-10-CM

## 2019-09-13 RX ORDER — CLONAZEPAM 0.5 MG/1
.5-1 TABLET ORAL DAILY PRN
Qty: 60 TABLET | Refills: 1 | Status: SHIPPED | OUTPATIENT
Start: 2019-09-13 | End: 2019-11-15 | Stop reason: SDUPTHER

## 2019-09-13 RX ORDER — LORAZEPAM 1 MG/1
1 TABLET ORAL EVERY 8 HOURS PRN
Qty: 60 TABLET | Refills: 1 | Status: SHIPPED | OUTPATIENT
Start: 2019-09-13 | End: 2020-01-17 | Stop reason: SDUPTHER

## 2019-11-15 DIAGNOSIS — F41.9 ANXIETY: ICD-10-CM

## 2019-11-15 RX ORDER — QUETIAPINE FUMARATE 50 MG/1
75 TABLET, FILM COATED ORAL NIGHTLY
Qty: 135 TABLET | Refills: 1 | Status: SHIPPED | OUTPATIENT
Start: 2019-11-15 | End: 2020-01-17 | Stop reason: SDUPTHER

## 2019-11-15 RX ORDER — CLONAZEPAM 0.5 MG/1
.5-1 TABLET ORAL DAILY PRN
Qty: 60 TABLET | Refills: 1 | Status: SHIPPED | OUTPATIENT
Start: 2019-11-15 | End: 2020-01-17 | Stop reason: SDUPTHER

## 2019-11-15 RX ORDER — PAROXETINE HYDROCHLORIDE 20 MG/1
20 TABLET, FILM COATED ORAL DAILY
Qty: 90 TABLET | Refills: 1 | Status: SHIPPED | OUTPATIENT
Start: 2019-11-15 | End: 2020-01-17 | Stop reason: SDUPTHER

## 2019-11-15 RX ORDER — LORAZEPAM 1 MG/1
1 TABLET ORAL EVERY 8 HOURS PRN
Qty: 60 TABLET | Refills: 1 | Status: SHIPPED | OUTPATIENT
Start: 2019-11-15 | End: 2020-01-14

## 2020-01-17 RX ORDER — LORAZEPAM 1 MG/1
1 TABLET ORAL EVERY 8 HOURS PRN
Qty: 60 TABLET | Refills: 0 | Status: SHIPPED | OUTPATIENT
Start: 2020-01-17 | End: 2020-02-17 | Stop reason: SDUPTHER

## 2020-01-17 RX ORDER — QUETIAPINE FUMARATE 50 MG/1
75 TABLET, FILM COATED ORAL NIGHTLY
Qty: 45 TABLET | Refills: 0 | Status: SHIPPED | OUTPATIENT
Start: 2020-01-17 | End: 2020-01-31 | Stop reason: SDUPTHER

## 2020-01-17 RX ORDER — PAROXETINE HYDROCHLORIDE 20 MG/1
20 TABLET, FILM COATED ORAL DAILY
Qty: 30 TABLET | Refills: 0 | Status: SHIPPED | OUTPATIENT
Start: 2020-01-17 | End: 2020-01-31 | Stop reason: SDUPTHER

## 2020-01-17 RX ORDER — CLONAZEPAM 0.5 MG/1
.5-1 TABLET ORAL DAILY PRN
Qty: 60 TABLET | Refills: 0 | Status: SHIPPED | OUTPATIENT
Start: 2020-01-17 | End: 2020-02-17 | Stop reason: SDUPTHER

## 2020-01-17 NOTE — TELEPHONE ENCOUNTER
Patient left message 1/16/20 10:00 pm asking for refill on     Paxil 20 mg  Seroquel 50 mg  Klonopin 0.5 mg  Ativan 1 mg    Patient last seen 2/26/19 no future appt found. Drug [address] Maintenance   Topic Date Due    Pneumococcal 0-64 years Vaccine (1 of 1 - PPSV23) 07/21/1976    DTaP/Tdap/Td vaccine (1 - Tdap) 07/21/1981    HIV screen  07/21/1985    Cervical cancer screen  07/21/1991    Flu vaccine (1) 09/01/2019    Lipid screen  04/04/2023             (applicable per patient's age: Cancer Screenings, Depression Screening, Fall Risk Screening, Immunizations)    LDL Cholesterol (mg/dL)   Date Value   04/04/2018 139 (H)     BUN (mg/dL)   Date Value   04/03/2018 7      (goal A1C is < 7)   (goal LDL is <100) need 30-50% reduction from baseline     BP Readings from Last 3 Encounters:   02/26/19 102/60   09/18/18 110/72   05/11/18 132/80    (goal /80)      All Future Testing planned in [facility]:  Lab Frequency Next Occurrence       Next Visit Date:  No future appointments.          Patient Active Problem List:     OCD (obsessive compulsive disorder)     Anxiety     Fatigue     Primary fibromyalgia syndrome

## 2020-01-31 ENCOUNTER — OFFICE VISIT (OUTPATIENT)
Dept: FAMILY MEDICINE CLINIC | Age: 50
End: 2020-01-31
Payer: COMMERCIAL

## 2020-01-31 VITALS
OXYGEN SATURATION: 98 % | BODY MASS INDEX: 23.46 KG/M2 | WEIGHT: 146 LBS | HEART RATE: 79 BPM | SYSTOLIC BLOOD PRESSURE: 110 MMHG | HEIGHT: 66 IN | DIASTOLIC BLOOD PRESSURE: 80 MMHG

## 2020-01-31 PROCEDURE — 99214 OFFICE O/P EST MOD 30 MIN: CPT | Performed by: FAMILY MEDICINE

## 2020-01-31 RX ORDER — PAROXETINE 10 MG/1
10 TABLET, FILM COATED ORAL DAILY
Qty: 90 TABLET | Refills: 1 | Status: SHIPPED | OUTPATIENT
Start: 2020-01-31 | End: 2020-05-14 | Stop reason: SDUPTHER

## 2020-01-31 RX ORDER — QUETIAPINE FUMARATE 50 MG/1
75 TABLET, FILM COATED ORAL NIGHTLY
Qty: 135 TABLET | Refills: 1 | Status: SHIPPED | OUTPATIENT
Start: 2020-01-31 | End: 2020-05-14 | Stop reason: SDUPTHER

## 2020-01-31 ASSESSMENT — PATIENT HEALTH QUESTIONNAIRE - PHQ9
2. FEELING DOWN, DEPRESSED OR HOPELESS: 0
1. LITTLE INTEREST OR PLEASURE IN DOING THINGS: 0
SUM OF ALL RESPONSES TO PHQ9 QUESTIONS 1 & 2: 0
SUM OF ALL RESPONSES TO PHQ QUESTIONS 1-9: 0
SUM OF ALL RESPONSES TO PHQ QUESTIONS 1-9: 0

## 2020-01-31 ASSESSMENT — ENCOUNTER SYMPTOMS
GASTROINTESTINAL NEGATIVE: 1
RESPIRATORY NEGATIVE: 1

## 2020-01-31 NOTE — PROGRESS NOTES
Name: Lubna Padilla  : 1970         Chief Complaint:     Chief Complaint   Patient presents with    Anxiety       History of Present Illness:      Lubna Padilla is a 52 y.o.  female who presents with Anxiety      HPI     F/u anxiety. Doing much better, able to drive and even stay away overnight by herself. Feels she's managing things better and is able to identify her triggers. Still triggered by having to eat around other people, afraid she'll choke, so she used to skip company dinners, then was just ordering a drink, but now takes ativan prior to those functions which helps, was able to eat at last dinner meeting. Also finds on Sundays that she tends to have a breakdown, gets overstimulated, itches all over, feels like she's crawling out of her skin. Occurs when all the kids are over and things are hectic. Taking paxil 10 mg daily, down from 20 mg, for approx the past 6 mos. paxil along with klonopin really seems to keep her at a good level throughout the day until something triggers her, at which point she takes ativan 2 mg. Typically takes ativan 2 mg at bedtime. seroquel taking 50 mg qhs but once in a while takes 75 mg at hs, if she needed extra ativan during the day and doesn't take it at night. Sleeping pretty well, only occasionally having overnight panic attack. Psoriasis R foot for approx 3 mos, has spread into larger area. Started on sole and is on both sides of the foot now. Doesn't itch but is very painful, hurts to walk. Some joints also flare so she's afraid about psoriatic arthritis. Past Medical History:     Past Medical History:   Diagnosis Date    Anxiety         Past Surgical History:     Past Surgical History:   Procedure Laterality Date    LAPAROSCOPY          Medications:       Prior to Admission medications    Medication Sig Start Date End Date Taking?  Authorizing Provider   QUEtiapine (SEROQUEL) 50 MG tablet Take 1.5 tablets by mouth nightly 20  Yes Jose Leyva edema.  Pulmonary:      Effort: Pulmonary effort is normal.      Breath sounds: Normal breath sounds. Abdominal:      General: Bowel sounds are normal.      Palpations: Abdomen is soft. Tenderness: There is no abdominal tenderness. Musculoskeletal:      Comments: Normal tone and bulk, normal gait. Lymphadenopathy:      Cervical: No cervical adenopathy. Skin:     General: Skin is warm and dry. Findings: Rash (Right mid to hindfoot: Very well demarcated, erythematous, raw rash with fine white scaling overlying) present. Neurological:      Mental Status: She is alert and oriented to person, place, and time. Psychiatric:         Behavior: Behavior normal.         Data:     Lab Results   Component Value Date     04/03/2018    K 4.0 04/03/2018     04/03/2018    CO2 24 04/03/2018    BUN 7 04/03/2018    CREATININE 0.80 04/03/2018    GLUCOSE 101 04/03/2018     Lab Results   Component Value Date    WBC 6.6 04/03/2018    RBC 5.33 04/03/2018    HGB 15.8 04/03/2018    HCT 47.0 04/03/2018    MCV 88.2 04/03/2018    MCH 29.7 04/03/2018    MCHC 33.7 04/03/2018    RDW 12.7 04/03/2018     04/03/2018    MPV NOT REPORTED 04/03/2018     Lab Results   Component Value Date    TSH 0.56 04/03/2018     Lab Results   Component Value Date    CHOL 233 04/04/2018    HDL 69 04/04/2018         Assessment & Plan:        Diagnosis Orders   1. Psoriasis     2. Anxiety     3. Stress     area of psoriasis on foot, nowhere else. Has improved with use of gold bond. Trial of high-potency topical steroid. F/u if not improving and would refer to derm vs rheumatology. Reviewed previous inflamm serology which was all neg but has not had hla b27 test. No synovitis on exam but reports flares of arthritis. Severe anxiety now in decent control on paxil, seroquel, klonopin, ativan. Continue same.  Discussed ways to handle stress with setting boundaries with family, scheduling out her time, trying to make the most of time that she's traveling so she has less work to do at home on weekends. F/u 6 mos    >25 min spent with pt, >50% counseling as above        Requested Prescriptions     Signed Prescriptions Disp Refills    QUEtiapine (SEROQUEL) 50 MG tablet 135 tablet 1     Sig: Take 1.5 tablets by mouth nightly    PARoxetine (PAXIL) 10 MG tablet 90 tablet 1     Sig: Take 1 tablet by mouth daily    triamcinolone (KENALOG) 0.1 % ointment 80 g 1     Sig: Apply topically 2 times daily for 7 days       Patient Instructions   SURVEY:    You may be receiving a survey from [employer] regarding your visit today. You may get this in the mail, through your MyChart or in your email. Please complete the survey to enable us to provide the highest quality of care to you and your family. If you cannot score us as very good ( 5 Stars) on any question, please feel free to call the office to discuss how we could have made your experience exceptional.     Thank you. Clinical Care Team:  Dr. Jacquelin Chavira DO                                           [address] Team:  10 [facility] received counseling on the following healthy behaviors: medication adherence  Reviewed prior labs and health maintenance. Continue current medications, diet and exercise. Discussed use, benefit, and side effects of prescribed medications. Barriers to medication compliance addressed. Patient given educational materials - see patient instructions. All patient questions answered. Patient voiced understanding.      Electronically signed by Jacquelin Chavira DO on 1/31/2020 at 10:49 PM   [address]  Dept: [phone]

## 2020-02-17 RX ORDER — CLONAZEPAM 0.5 MG/1
.5-1 TABLET ORAL DAILY PRN
Qty: 60 TABLET | Refills: 2 | Status: SHIPPED | OUTPATIENT
Start: 2020-02-17 | End: 2020-05-14 | Stop reason: SDUPTHER

## 2020-02-17 RX ORDER — LORAZEPAM 1 MG/1
1 TABLET ORAL EVERY 8 HOURS PRN
Qty: 60 TABLET | Refills: 2 | Status: SHIPPED | OUTPATIENT
Start: 2020-02-17 | End: 2020-05-14 | Stop reason: SDUPTHER

## 2020-02-17 NOTE — TELEPHONE ENCOUNTER
kklonopin 0.5 mg  Ativan 1 mg  seroquel 50 mg    Dm-carly    Health Maintenance   Topic Date Due    Pneumococcal 0-64 years Vaccine (1 of 1 - PPSV23) 07/21/1976    DTaP/Tdap/Td vaccine (1 - Tdap) 07/21/1981    HIV screen  07/21/1985    Cervical cancer screen  07/21/1991    Flu vaccine (1) 09/01/2019    Shingles Vaccine (1 of 2) 07/21/2020    Lipid screen  04/04/2023    Hepatitis A vaccine  Aged Out    Hepatitis B vaccine  Aged Out    Hib vaccine  Aged Out    Meningococcal (ACWY) vaccine  Aged Out             (applicable per patient's age: Cancer Screenings, Depression Screening, Fall Risk Screening, Immunizations)    LDL Cholesterol (mg/dL)   Date Value   04/04/2018 139 (H)     BUN (mg/dL)   Date Value   04/03/2018 7      (goal A1C is < 7)   (goal LDL is <100) need 30-50% reduction from baseline     BP Readings from Last 3 Encounters:   01/31/20 110/80   02/26/19 102/60   09/18/18 110/72    (goal /80)      All Future Testing planned in [facility]:  Lab Frequency Next Occurrence       Next Visit Date:  Future Appointments   Date Time Provider Dima Elaine   7/31/2020  2:20 PM DO Jeramy Perez [facility]            Patient Active Problem List:     OCD (obsessive compulsive disorder)     Anxiety     Fatigue     Primary fibromyalgia syndrome

## 2020-04-14 ENCOUNTER — TELEMEDICINE (OUTPATIENT)
Dept: FAMILY MEDICINE CLINIC | Age: 50
End: 2020-04-14
Payer: COMMERCIAL

## 2020-04-14 PROCEDURE — 99213 OFFICE O/P EST LOW 20 MIN: CPT | Performed by: FAMILY MEDICINE

## 2020-04-14 RX ORDER — AMOXICILLIN 875 MG/1
875 TABLET, COATED ORAL 2 TIMES DAILY
Qty: 20 TABLET | Refills: 0 | Status: SHIPPED | OUTPATIENT
Start: 2020-04-14 | End: 2020-04-24

## 2020-04-14 SDOH — ECONOMIC STABILITY: INCOME INSECURITY: HOW HARD IS IT FOR YOU TO PAY FOR THE VERY BASICS LIKE FOOD, HOUSING, MEDICAL CARE, AND HEATING?: NOT HARD AT ALL

## 2020-04-14 SDOH — ECONOMIC STABILITY: TRANSPORTATION INSECURITY
IN THE PAST 12 MONTHS, HAS THE LACK OF TRANSPORTATION KEPT YOU FROM MEDICAL APPOINTMENTS OR FROM GETTING MEDICATIONS?: NOT ASKED

## 2020-04-14 SDOH — ECONOMIC STABILITY: FOOD INSECURITY: WITHIN THE PAST 12 MONTHS, THE FOOD YOU BOUGHT JUST DIDN'T LAST AND YOU DIDN'T HAVE MONEY TO GET MORE.: NEVER TRUE

## 2020-04-14 SDOH — ECONOMIC STABILITY: FOOD INSECURITY: WITHIN THE PAST 12 MONTHS, YOU WORRIED THAT YOUR FOOD WOULD RUN OUT BEFORE YOU GOT MONEY TO BUY MORE.: NEVER TRUE

## 2020-04-14 ASSESSMENT — ENCOUNTER SYMPTOMS: RESPIRATORY NEGATIVE: 1

## 2020-04-14 NOTE — PROGRESS NOTES
Name: Olaf Hunter  : 1970         Chief Complaint:     Chief Complaint   Patient presents with    Otalgia     sharp pains deep in her ear, can't open mouth    Jaw Pain       History of Present Illness:      Olaf Hunter is a 52 y.o.  female who presents with Otalgia (sharp pains deep in her ear, can't open mouth) and Jaw Pain      HPI     Pt seen by video visit with complaints of pain in R ear and jaw. Some of this has been present for quite a while, as she reports we had discussed at a previous visit. However, for the past few days she's had terrible pain which she's convinced is the ear, has checekd mouth and doesn't have any problem with teeth or gums. Pain radiates into lateral neck and jaw. Pain can exacerbate with opening mouth and singing. Pain waking from sleep. About a week ago would notice the sound of fluid going in and out of R ear with tilting her head one way or the other, including while cleaning out EAC's, which she does with baby wipes. Denies hearing loss or any popping of the ears. Dx with TMJ 20 yrs ago and does think that could contribute to current symptoms, with pattern of radiation of this pain. She recalls that physical therapy was mentioned at time of diagnosis, which was by a previous PCP, but patient did not do the therapy. She does not have any type of mouthguard. Past Medical History:     Past Medical History:   Diagnosis Date    Anxiety         Past Surgical History:     Past Surgical History:   Procedure Laterality Date    LAPAROSCOPY          Medications:       Prior to Admission medications    Medication Sig Start Date End Date Taking? Authorizing Provider   amoxicillin (AMOXIL) 875 MG tablet Take 1 tablet by mouth 2 times daily for 10 days 20 Yes Graciela Morse DO   clonazePAM (KLONOPIN) 0.5 MG tablet Take 1-2 tablets by mouth daily as needed for Anxiety for up to 90 days.  20  Graciela Morse DO   LORazepam (ATIVAN) 1 MG given educational materials - see patient instructions. All patient questions answered. Patient voiced understanding. Electronically signed by Mirza Ramírez DO on 4/14/2020 at 3:18 PM   [address] [address]  Dept: [phone]     Akbar Brooks is a 52 y.o. female being evaluated by a Virtual Visit (video visit) encounter to address concerns as mentioned above. A caregiver was present when appropriate. Due to this being a TeleHealth encounter (During ABDJR-31 public health emergency), evaluation of the following organ systems was limited: Vitals/Constitutional/EENT/Resp/CV/GI//MS/Neuro/Skin/Heme-Lymph-Imm. Pursuant to the emergency declaration under the [address] [place] [address], [address] [place] [address] authority and the [employer] Act, this Virtual Visit was conducted with patient's (and/or legal guardian's) consent, to reduce the patient's risk of exposure to COVID-19 and provide necessary medical care. The patient (and/or legal guardian) has also been advised to contact this office for worsening conditions or problems, and seek emergency medical treatment and/or call 911 if deemed necessary. Services were provided through a video synchronous discussion virtually to substitute for in-person clinic visit. Patient and provider were located at their individual homes. --Mirza Ramírez DO on 4/14/2020 at 3:21 PM    An electronic signature was used to authenticate this note.

## 2020-05-14 RX ORDER — PAROXETINE 10 MG/1
10 TABLET, FILM COATED ORAL DAILY
Qty: 90 TABLET | Refills: 1 | Status: SHIPPED | OUTPATIENT
Start: 2020-05-14 | End: 2020-10-30

## 2020-05-14 RX ORDER — QUETIAPINE FUMARATE 50 MG/1
75 TABLET, FILM COATED ORAL NIGHTLY
Qty: 135 TABLET | Refills: 1 | Status: SHIPPED | OUTPATIENT
Start: 2020-05-14 | End: 2020-10-08

## 2020-05-14 RX ORDER — LORAZEPAM 1 MG/1
1 TABLET ORAL EVERY 8 HOURS PRN
Qty: 60 TABLET | Refills: 2 | Status: SHIPPED | OUTPATIENT
Start: 2020-05-14 | End: 2020-07-14

## 2020-05-14 RX ORDER — CLONAZEPAM 0.5 MG/1
.5-1 TABLET ORAL DAILY PRN
Qty: 60 TABLET | Refills: 2 | Status: SHIPPED | OUTPATIENT
Start: 2020-05-14 | End: 2020-07-14

## 2020-06-08 ENCOUNTER — HOSPITAL ENCOUNTER (OUTPATIENT)
Age: 50
Discharge: HOME | End: 2020-06-08
Payer: COMMERCIAL

## 2020-06-08 ENCOUNTER — HOSPITAL ENCOUNTER (OUTPATIENT)
Dept: GENERAL RADIOLOGY | Age: 50
Discharge: HOME | End: 2020-06-10
Payer: COMMERCIAL

## 2020-06-08 ENCOUNTER — HOSPITAL ENCOUNTER (OUTPATIENT)
Age: 50
Discharge: HOME | End: 2020-06-10
Payer: COMMERCIAL

## 2020-06-08 LAB
ABSOLUTE EOS #: 0.14 K/UL (ref 0–0.4)
ABSOLUTE IMMATURE GRANULOCYTE: ABNORMAL K/UL (ref 0–0.3)
ABSOLUTE LYMPH #: 1.75 K/UL (ref 1–4.8)
ABSOLUTE MONO #: 0.07 K/UL (ref 0–1)
ALBUMIN SERPL-MCNC: 4.7 G/DL (ref 3.5–5.2)
ALBUMIN/GLOBULIN RATIO: ABNORMAL (ref 1–2.5)
ALP BLD-CCNC: 104 U/L (ref 35–104)
ALT SERPL-CCNC: 9 U/L (ref 5–33)
ANION GAP SERPL CALCULATED.3IONS-SCNC: 7 MMOL/L (ref 9–17)
AST SERPL-CCNC: 14 U/L
ATYPICAL LYMPHOCYTE ABSOLUTE COUNT: 0.07 K/UL (ref 0–1)
ATYPICAL LYMPHOCYTES: 1 %
BASOPHILS # BLD: 1 % (ref 0–2)
BASOPHILS ABSOLUTE: 0.07 K/UL (ref 0–0.2)
BILIRUB SERPL-MCNC: 0.69 MG/DL (ref 0.3–1.2)
BUN BLDV-MCNC: 11 MG/DL (ref 6–20)
BUN/CREAT BLD: 13 (ref 9–20)
CALCIUM SERPL-MCNC: 10.2 MG/DL (ref 8.6–10.4)
CHLORIDE BLD-SCNC: 103 MMOL/L (ref 98–107)
CHOLESTEROL/HDL RATIO: 3.9
CHOLESTEROL: 219 MG/DL
CO2: 28 MMOL/L (ref 20–31)
CREAT SERPL-MCNC: 0.88 MG/DL (ref 0.5–0.9)
DIFFERENTIAL TYPE: ABNORMAL
EOSINOPHILS RELATIVE PERCENT: 2 % (ref 0–5)
GFR AFRICAN AMERICAN: >60 ML/MIN
GFR NON-AFRICAN AMERICAN: >60 ML/MIN
GFR SERPL CREATININE-BSD FRML MDRD: ABNORMAL ML/MIN/{1.73_M2}
GFR SERPL CREATININE-BSD FRML MDRD: ABNORMAL ML/MIN/{1.73_M2}
GLUCOSE BLD-MCNC: 103 MG/DL (ref 70–99)
HCT VFR BLD CALC: 42.7 % (ref 36–46)
HDLC SERPL-MCNC: 56 MG/DL
HEMOGLOBIN: 14.8 G/DL (ref 12–16)
IMMATURE GRANULOCYTES: ABNORMAL %
LDL CHOLESTEROL: 140 MG/DL (ref 0–130)
LYMPHOCYTES # BLD: 25 % (ref 15–40)
MAGNESIUM: 2.1 MG/DL (ref 1.6–2.6)
MCH RBC QN AUTO: 30.5 PG (ref 26–34)
MCHC RBC AUTO-ENTMCNC: 34.7 G/DL (ref 31–37)
MCV RBC AUTO: 87.9 FL (ref 80–100)
MONOCYTES # BLD: 1 % (ref 4–8)
MORPHOLOGY: ABNORMAL
NRBC AUTOMATED: ABNORMAL PER 100 WBC
PATIENT FASTING?: YES
PDW BLD-RTO: 11.4 % (ref 12.1–15.2)
PLATELET # BLD: 316 K/UL (ref 140–450)
PLATELET ESTIMATE: ABNORMAL
PMV BLD AUTO: ABNORMAL FL
POTASSIUM SERPL-SCNC: 4.1 MMOL/L (ref 3.7–5.3)
RBC # BLD: 4.86 M/UL (ref 4–5.2)
RBC # BLD: ABNORMAL 10*6/UL
SEG NEUTROPHILS: 70 % (ref 47–75)
SEGMENTED NEUTROPHILS ABSOLUTE COUNT: 4.9 K/UL (ref 2.5–7)
SODIUM BLD-SCNC: 138 MMOL/L (ref 135–144)
TOTAL PROTEIN: 7.9 G/DL (ref 6.4–8.3)
TRIGL SERPL-MCNC: 116 MG/DL
TSH SERPL DL<=0.05 MIU/L-ACNC: 0.41 MIU/L (ref 0.3–5)
VITAMIN D 25-HYDROXY: 15.7 NG/ML (ref 30–100)
VLDLC SERPL CALC-MCNC: ABNORMAL MG/DL (ref 1–30)
WBC # BLD: 7 K/UL (ref 3.5–11)
WBC # BLD: ABNORMAL 10*3/UL

## 2020-06-08 PROCEDURE — 85025 COMPLETE CBC W/AUTO DIFF WBC: CPT

## 2020-06-08 PROCEDURE — 84443 ASSAY THYROID STIM HORMONE: CPT

## 2020-06-08 PROCEDURE — 36415 COLL VENOUS BLD VENIPUNCTURE: CPT

## 2020-06-08 PROCEDURE — 80053 COMPREHEN METABOLIC PANEL: CPT

## 2020-06-08 PROCEDURE — 83735 ASSAY OF MAGNESIUM: CPT

## 2020-06-08 PROCEDURE — 82306 VITAMIN D 25 HYDROXY: CPT

## 2020-06-08 PROCEDURE — 93005 ELECTROCARDIOGRAM TRACING: CPT

## 2020-06-08 PROCEDURE — 80061 LIPID PANEL: CPT

## 2020-06-08 PROCEDURE — 71046 X-RAY EXAM CHEST 2 VIEWS: CPT

## 2020-06-09 ENCOUNTER — OFFICE VISIT (OUTPATIENT)
Dept: CARDIOLOGY CLINIC | Age: 50
End: 2020-06-09
Payer: COMMERCIAL

## 2020-06-09 VITALS
OXYGEN SATURATION: 96 % | HEART RATE: 78 BPM | SYSTOLIC BLOOD PRESSURE: 120 MMHG | WEIGHT: 151 LBS | DIASTOLIC BLOOD PRESSURE: 86 MMHG | BODY MASS INDEX: 24.37 KG/M2

## 2020-06-09 LAB
EKG ATRIAL RATE: 74 BPM
EKG P AXIS: 56 DEGREES
EKG P-R INTERVAL: 130 MS
EKG Q-T INTERVAL: 416 MS
EKG QRS DURATION: 140 MS
EKG QTC CALCULATION (BAZETT): 461 MS
EKG R AXIS: 86 DEGREES
EKG T AXIS: -20 DEGREES
EKG VENTRICULAR RATE: 74 BPM

## 2020-06-09 PROCEDURE — 99214 OFFICE O/P EST MOD 30 MIN: CPT | Performed by: INTERNAL MEDICINE

## 2020-06-09 PROCEDURE — 93010 ELECTROCARDIOGRAM REPORT: CPT | Performed by: INTERNAL MEDICINE

## 2020-06-09 RX ORDER — NICOTINE 21 MG/24HR
1 PATCH, TRANSDERMAL 24 HOURS TRANSDERMAL DAILY
Qty: 14 PATCH | Refills: 5 | Status: SHIPPED | OUTPATIENT
Start: 2020-06-09 | End: 2020-09-14

## 2020-06-09 NOTE — LETTER
constitutional, eyes, ears, nose and throat, cardiovascular, respiratory, GI, , musculoskeletal, integumentary, neurologic, endocrine, hematologic and allergic/immunologic are negative except for what is described above. No weight loss or weight gain. No change in bowel habits. No blood in stools. No fevers, sweats or chills. PHYSICAL EXAMINATION:  VITAL SIGNS:  Her blood pressure was 120/86 with a heart rate of 78 and regular. Respiratory rate 18. O2 sat 96%. Weight 151 pounds. GENERAL:  She is a pleasant 42-year-old female. Denied pain. She was oriented to person, place and time. Answered questions appropriately. SKIN:  No unusual skin changes. HEENT:  The pupils are equally round and intact. Mucous membranes were dry. NECK:  No JVD. Good carotid pulses. No carotid bruits. No lymphadenopathy or thyromegaly. CARDIOVASCULAR EXAM:  S1 and S2 were normal.  No S3 or S4. Soft systolic blowing type murmur. No diastolic murmur. PMI was normal.  No lift, thrust, or pericardial friction rub. LUNGS:  Quite clear to auscultation and percussion. ABDOMEN:  Soft and nontender. Good bowel sounds. The aorta was not enlarged. No hepatomegaly, splenomegaly. EXTREMITIES:  Good femoral pulses. Good pedal pulses. No pedal edema. Skin was warm and dry. No calf tenderness. Nail beds pink. Good cap refill. PULSES:  Bilateral symmetrical radial, brachial and carotid pulses. No carotid bruits. Good femoral and pedal pulses. NEUROLOGIC EXAM:  Within normal limits. PSYCHIATRIC EXAM:  Within normal limits. LABORATORY DATA:  From 06/08/2020, sodium 138, potassium 4.1, BUN 11, creatinine 0.88, GFR greater than 60, magnesium 2.1, glucose 103, calcium was 10.2. Cholesterol 219 with an HDL of 56, LDL of 140, triglycerides 116. ALT was 9, AST was 14. TSH 0.41. Vitamin D very low at 15.7. White count 7.0, hemoglobin 14.8 with a platelet count of 299,[phone]. EKG showed sinus rhythm with a right bundle-branch block, with T-wave abnormalities consistent with inferior wall ischemia. Chest x-ray was within normal limits. I did a bedside echocardiogram that showed normal LV function with an EF in the 55% range. I did not see any obvious valve abnormalities. IMPRESSION:  1.  New onset of chest tightness and left arm discomfort, occurring spontaneously multiple times per day over the last several months increasing in frequency, which could be an atypical presentation for angina. 2.  Abnormal EKG with right bundle-branch block and T-wave changes inferiorly. 3.  Hyperlipidemia, untreated. 4.  Strong family history of coronary artery disease. 5.  Continued smoking abuse. 6.  History of anxiety. 7.  Very low vitamin D at 15. PLAN:  1. Start Nicoderm patch (with much trepidation on her part. ..). 2.  Treadmill Myoview stress test.  3.  Start vitamin D 5000 units daily. 4.  If the stress test is normal, we will call her with the results. DISCUSSION:  Mrs. Debrah Gosselin has developed chest pain and left arm discomfort over the last several months. It is fairly atypical in presentation occurring at any time during the day, not related to activity or position. It does not happen at night. She has multiple risk factors for coronary artery disease and certainly even though it is an atypical presentation, a stress test with Myoview would be indicated. She has an abnormal EKG with T-wave inversion inferiorly and right bundle-branch block, which makes a standard stress test unreliable. The other etiologies could be musculoskeletal, esophageal such as spasm, GI or pulmonary. She has no shortness of breath to indicate that this could be a pulmonary embolism and there is no indication to do a CT scan.   It would be somewhat unusual for this to be GI when it does not seem to be related to food and also it does not increase with lying down consistent with GERD. However, chest tightness or heaviness radiating to arm could be esophageal spasm. Musculoskeletal would be probably the most likely cause for her discomfort. I cannot reproduce it, however, with any palpitations. With her risk factors, we will do a treadmill Myoview stress test and call her with the results. If it would continue, I gave the option of trying very low dose of Cardizem for possible spasm, although she does not wish to add any medications, which is reasonable. She states that she can [place] with the discomfort\" if she knows this is not cardiac. We did discuss her smoking. She had, many years ago, tried Wellbutrin but had a significant weight gain. I asked if she would be amenable to try any other medication to help stop smoking and she reluctantly said yes and she agreed to do Nicoderm patch. We gave her the smallest dose of 7 mg for 24 hours, which she will try. I will not see her in a return visit unless her stress test is abnormal.  If it is normal, then I would continue risk modification, although it is difficult with her not wanting to take any medications to treat her lipids and with her continuing to smoke. Thank you very much for allowing me the privilege of seeing Mrs. Jerry Cohen . If you have any questions on my thoughts, please do not hesitate to contact me.      Sincerely,        Melanie Looney    D: 06/09/2020 16:11:57     T: 06/09/2020 16:23:03     [id number]  Job#: [id number]   Doc#: [id number]

## 2020-06-09 NOTE — PROGRESS NOTES
Ov DR [employer]   For past month   Chest tightness and lt  Arm pain on and off  Trouble catching  breath  Lt ankle edema. Episodes last 1/2 hr to 1 hr. Sometimes 3-4 times a day  Can happen sitting or   With exertion. 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 One child at home  Devon Mckeon age 12. Thinks energy level  Lower past couple months. Past couple months also  Sudden onset diaphoresis  during the day. No dizziness   No palpitations. Bedside echo done. Cont to smoke  Will do stress test.  And call.

## 2020-06-17 NOTE — PROGRESS NOTES
Angel Turpin M.D. [address] [place] [address]  [phone]        2020        Gabriela Dykes DO  [facility], [place] [address]    RE:   Phil Coronado  :  1970    Dear Dr. Issac Gaines:    CHIEF COMPLAINT:  Chest pain. HISTORY OF PRESENT ILLNESS:  I had the pleasure of seeing Mrs. Adiel Giron in our office on 2020. She is a pleasant, somewhat anxious 51-year-old female with multiple risk factors for coronary artery disease. She has hyperlipidemia, a strong family history of coronary artery disease, and continues to smoke. I saw her on 2018. She had been having diaphoresis and also been having chest pain. We did an echocardiogram, which showed normal LV size and function. We also did a Myoview cardiac stress test on 2018, that was normal.    She was doing well until approximately 1 to 2 months ago. She began developing chest tightness and heaviness radiating to her left arm. This has occurred multiple times and it comes at any time during the day. It is not associated with any certain activity. It does not usually come at night. It is not associated particularly with diaphoresis or unusual shortness of breath. However, it is happening more frequently now, multiple times per day. It can last anywhere from minutes to up to a half hour. She has also been developing diaphoresis that occurs spontaneously. She will occasionally have night sweats, although her diaphoresis usually comes during the day. She denies any palpitations. She has had no syncope or near syncope, lightheadedness or dizziness. She is not exercising. 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 She actually enjoys the driving as it is her \"alone time. \"    Her energy level has been slightly lower in the past several months. Again, she has had onset of some spontaneous diaphoresis during the day that seems independent of the temperature. She went through menopause several years ago. She has had no hospitalizations or procedures. I know she has struggled with anxiety, although when I talk to her she states that her job is a lower stress job than what it was previously. Her home situation also seems fairly good, with only younger son, Yehuda Rosario, age 12, being at home with her and her , Aroldo Mukherjee. She has never had a myocardial infarction. She had one stress test in 2018. She has never had a cardiac catheterization. CARDIAC RISK FACTORS:  Smoking:  Positive. Other Family Members:  Positive. Peripheral Vascular Disease:  Negative. Diabetes:  Negative. Hypertension:  Negative. Hyperlipidemia:  Positive. MEDICATIONS:  She is on Klonopin 0.5 mg 1 to 2 daily p.r.n., Ativan 1 mg q.8 hours p.r.n., Paxil 10 mg daily, Seroquel 50 mg 1-1/2 tablets nightly. PAST MEDICAL HISTORY:  1. She had a laparoscopy many years ago. 2.  History of anxiety and depression. FAMILY HISTORY:  Significant on the mother's side of the family. She has multiple aunts and uncles on her mother's side who had myocardial infarction. She has 2 brothers and sisters who have no heart disease. SOCIAL HISTORY:  She is 52years old. Smokes half a pack to 1 pack of cigarettes a day. , Aroldo Mukherjee, has had cardiac issues. She is . Has 3 daughters, with her  having 4 children. The children are all gone except for her youngest child, Yehuda Rosario, who is 12years old. 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 Her territory is very large involving multiple states and she does an extensive amount of driving. Does not exercise and does not use alcohol. REVIEW OF SYSTEMS:  Cardiac as above.   Other systems reviewed including constitutional, eyes, ears, nose and throat, cardiovascular, respiratory, GI, , musculoskeletal, integumentary, neurologic, endocrine, hematologic and allergic/immunologic are negative except for what is described above. No weight loss or weight gain. No change in bowel habits. No blood in stools. No fevers, sweats or chills. PHYSICAL EXAMINATION:  VITAL SIGNS:  Her blood pressure was 120/86 with a heart rate of 78 and regular. Respiratory rate 18. O2 sat 96%. Weight 151 pounds. GENERAL:  She is a pleasant 80-year-old female. Denied pain. She was oriented to person, place and time. Answered questions appropriately. SKIN:  No unusual skin changes. HEENT:  The pupils are equally round and intact. Mucous membranes were dry. NECK:  No JVD. Good carotid pulses. No carotid bruits. No lymphadenopathy or thyromegaly. CARDIOVASCULAR EXAM:  S1 and S2 were normal.  No S3 or S4. Soft systolic blowing type murmur. No diastolic murmur. PMI was normal.  No lift, thrust, or pericardial friction rub. LUNGS:  Quite clear to auscultation and percussion. ABDOMEN:  Soft and nontender. Good bowel sounds. The aorta was not enlarged. No hepatomegaly, splenomegaly. EXTREMITIES:  Good femoral pulses. Good pedal pulses. No pedal edema. Skin was warm and dry. No calf tenderness. Nail beds pink. Good cap refill. PULSES:  Bilateral symmetrical radial, brachial and carotid pulses. No carotid bruits. Good femoral and pedal pulses. NEUROLOGIC EXAM:  Within normal limits. PSYCHIATRIC EXAM:  Within normal limits. LABORATORY DATA:  From 06/08/2020, sodium 138, potassium 4.1, BUN 11, creatinine 0.88, GFR greater than 60, magnesium 2.1, glucose 103, calcium was 10.2. Cholesterol 219 with an HDL of 56, LDL of 140, triglycerides 116. ALT was 9, AST was 14. TSH 0.41. Vitamin D very low at 15.7. White count 7.0, hemoglobin 14.8 with a platelet count of 831,536.     EKG showed sinus rhythm with a right bundle-branch block, with T-wave abnormalities spasm.    Musculoskeletal would be probably the most likely cause for her discomfort. I cannot reproduce it, however, with any palpitations. With her risk factors, we will do a treadmill Myoview stress test and call her with the results. If it would continue, I gave the option of trying very low dose of Cardizem for possible spasm, although she does not wish to add any medications, which is reasonable. She states that she can [place] with the discomfort\" if she knows this is not cardiac. We did discuss her smoking. She had, many years ago, tried Wellbutrin but had a significant weight gain. I asked if she would be amenable to try any other medication to help stop smoking and she reluctantly said yes and she agreed to do Nicoderm patch. We gave her the smallest dose of 7 mg for 24 hours, which she will try. I will not see her in a return visit unless her stress test is abnormal.  If it is normal, then I would continue risk modification, although it is difficult with her not wanting to take any medications to treat her lipids and with her continuing to smoke. Thank you very much for allowing me the privilege of seeing Mrs. Cameron Zuleta . If you have any questions on my thoughts, please do not hesitate to contact me.      Sincerely,        Edwin Chatman    D: 06/09/2020 16:11:57     T: 06/09/2020 16:23:03     [id number]  Job#: [id number]   Doc#: [id number]

## 2020-06-18 ENCOUNTER — HOSPITAL ENCOUNTER (OUTPATIENT)
Dept: NUCLEAR MEDICINE | Age: 50
Discharge: HOME | End: 2020-06-20
Payer: COMMERCIAL

## 2020-06-18 ENCOUNTER — TELEPHONE (OUTPATIENT)
Dept: FAMILY MEDICINE CLINIC | Age: 50
End: 2020-06-18

## 2020-06-18 ENCOUNTER — HOSPITAL ENCOUNTER (OUTPATIENT)
Dept: NON INVASIVE DIAGNOSTICS | Age: 50
Discharge: HOME | End: 2020-06-18
Payer: COMMERCIAL

## 2020-06-18 PROCEDURE — 78452 HT MUSCLE IMAGE SPECT MULT: CPT

## 2020-06-18 PROCEDURE — A9500 TC99M SESTAMIBI: HCPCS | Performed by: INTERNAL MEDICINE

## 2020-06-18 PROCEDURE — 3430000000 HC RX DIAGNOSTIC RADIOPHARMACEUTICAL: Performed by: INTERNAL MEDICINE

## 2020-06-18 PROCEDURE — 93017 CV STRESS TEST TRACING ONLY: CPT

## 2020-06-18 RX ADMIN — TETRAKIS(2-METHOXYISOBUTYLISOCYANIDE)COPPER(I) TETRAFLUOROBORATE 10 MILLICURIE: 1 INJECTION, POWDER, LYOPHILIZED, FOR SOLUTION INTRAVENOUS at 07:06

## 2020-06-18 RX ADMIN — TETRAKIS(2-METHOXYISOBUTYLISOCYANIDE)COPPER(I) TETRAFLUOROBORATE 30 MILLICURIE: 1 INJECTION, POWDER, LYOPHILIZED, FOR SOLUTION INTRAVENOUS at 07:37

## 2020-06-19 RX ORDER — PREDNISONE 20 MG/1
40 TABLET ORAL DAILY
Qty: 10 TABLET | Refills: 0 | Status: SHIPPED | OUTPATIENT
Start: 2020-06-19 | End: 2020-06-24

## 2020-06-19 NOTE — PROCEDURES
[place] [address]                              CARDIAC STRESS TEST    PATIENT NAME: Lefty Gloria                    :        1970  MED REC NO:   [id number]                              ROOM:  ACCOUNT NO:   [de-identified]                           ADMIT DATE: 2020  PROVIDER:     Tru Spivey Ahmavincenzo      DATE OF STUDY:  2020    Cardiovascular Diagnostics Department    Ordering Provider:  Camila Martin MD    Primary Care Provider:  Drew Hart. Zenaida Biswas    Interpreting Physician:   Tru Gorman. Cristian Bernal MD    MYOCARDIAL PERFUSION STRESS IMAGING    The stress ECG results are reported separately. NUCLEAR IMAGING RESULTS:  The overall quality of the study is fair. Mild attenuation artifact was seen. There is no evidence of abnormal  lung uptake. Additionally, the right ventricle appears normal.  The  left ventricular cavity is noted to be normal in size on stress images. There is no evidence of transient ischemic dilatation (TID) of the left  ventricle. Gated SPECT imaging reveals normal myocardial thickening and wall motion  with a calculated left ventricular ejection fraction (EF) of 62%. The rest images demonstrate homogenous tracer distribution throughout  the myocardium. On stress imaging, a small perfusion abnormality of mild intensity was  noted in the anterior region may be due to artifact. IMPRESSION:  1. Mildly abnormal myocardial perfusion study. There is a small  perfusion defect of mild intensity in the anterior region during stress  imaging, which is most consistent with ischemia, but may be due to  artifact. 2.  Global left ventricular systolic function was normal with an EF of  62 without regional wall motion abnormalities. Overall these results are most consistent with a low risk scan.     Depending on the patient's symptoms and level of clinical

## 2020-06-24 ENCOUNTER — TELEPHONE (OUTPATIENT)
Dept: CARDIOLOGY CLINIC | Age: 50
End: 2020-06-24

## 2020-06-24 RX ORDER — ASPIRIN 81 MG/1
81 TABLET ORAL DAILY
COMMUNITY
End: 2020-10-05 | Stop reason: ALTCHOICE

## 2020-06-24 NOTE — TELEPHONE ENCOUNTER
Pt called with stress test after reviewed per DR Mary Jones   Will do medical therapy first start on lopressor 25mg bid  And asa  81mg daily   See pt in 1 mth.

## 2020-06-29 RX ORDER — AMLODIPINE BESYLATE 2.5 MG/1
2.5 TABLET ORAL DAILY
Qty: 30 TABLET | Refills: 11 | Status: SHIPPED | OUTPATIENT
Start: 2020-06-29 | End: 2020-10-05 | Stop reason: ALTCHOICE

## 2020-07-14 RX ORDER — LORAZEPAM 1 MG/1
TABLET ORAL
Qty: 60 TABLET | Refills: 2 | Status: SHIPPED | OUTPATIENT
Start: 2020-07-14 | End: 2020-10-08

## 2020-07-14 RX ORDER — CLONAZEPAM 0.5 MG/1
TABLET ORAL
Qty: 60 TABLET | Refills: 2 | Status: SHIPPED | OUTPATIENT
Start: 2020-07-14 | End: 2020-10-08

## 2020-07-28 ENCOUNTER — OFFICE VISIT (OUTPATIENT)
Dept: CARDIOLOGY CLINIC | Age: 50
End: 2020-07-28
Payer: COMMERCIAL

## 2020-07-28 VITALS
OXYGEN SATURATION: 97 % | SYSTOLIC BLOOD PRESSURE: 120 MMHG | HEART RATE: 83 BPM | BODY MASS INDEX: 24.21 KG/M2 | DIASTOLIC BLOOD PRESSURE: 84 MMHG | WEIGHT: 150 LBS

## 2020-07-28 PROCEDURE — 99213 OFFICE O/P EST LOW 20 MIN: CPT | Performed by: INTERNAL MEDICINE

## 2020-07-28 RX ORDER — CARVEDILOL 3.12 MG/1
3.12 TABLET ORAL 2 TIMES DAILY
COMMUNITY
End: 2020-07-28 | Stop reason: SDUPTHER

## 2020-07-28 RX ORDER — MULTIVIT-MIN/IRON/FOLIC ACID/K 18-600-40
2000 CAPSULE ORAL DAILY
COMMUNITY

## 2020-07-28 RX ORDER — CARVEDILOL 3.12 MG/1
3.12 TABLET ORAL 2 TIMES DAILY
Qty: 60 TABLET | Refills: 11 | Status: SHIPPED | OUTPATIENT
Start: 2020-07-28 | End: 2020-10-05 | Stop reason: ALTCHOICE

## 2020-07-28 NOTE — PATIENT INSTRUCTIONS
Will set up for carotid u/s    Will add Coreg 3.125 mg- start with  One tablet daily if tolerable increase to one tablet twice a day     Will proceed with heart cath on Aug 21

## 2020-07-28 NOTE — LETTER
speech was somewhat garbled during that time and it lasted approximately 45 minutes. She did have cold sweats. She had mild shortness of breath that subsided after approximately 30 to 45 minutes. The following day, she was very weak. She has had neck discomfort several times since that time, also associated with chest pain and chest discomfort. She continues to have a low energy level. 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 She is not exercising and has been fairly inactive at home. Again, her energy level has been low over the last several months. CARDIAC RISK FACTORS:  Smoking:  Positive. Other Family Members:  Positive. Peripheral Vascular Disease:  Negative. Diabetes:  Negative. Hypertension:  Negative. Hyperlipidemia:  Positive. MEDICATIONS AT THIS TIME:  She is on vitamin D 2000 units daily, Klonopin 0.5 mg daily, lorazepam 1 mg p.r.n., Norvasc 2.5 mg daily, aspirin 81 mg daily, Nicoderm patch daily, Seroquel 50 mg 1-1/2 tablets nightly, Paxil 10 mg daily. PAST MEDICAL HISTORY:  1. She has had laparoscopic surgery many years ago. 2.  History of anxiety and depression. FAMILY HISTORY:  Significant on the mother's side of the family. She has had multiple aunts and uncles on her mother's side, who have had myocardial infarction. Has 2 brothers and a sister, have no heart disease. SOCIAL HISTORY:  She is 48years old, was smoking a pack of cigarettes a day. She has got a Nicoderm patch, she is down to a half a pack a day. , Richard Pressley, has cardiac history. She is , 3 daughters with her  having 4 children. All children are gone except her youngest child, Jayna Guerrero, who is 12. 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 She does not exercise. Does not use alcohol. REVIEW OF SYSTEMS:  Cardiac as above.   Other systems reviewed including constitutional, eyes, ears, nose and throat, cardiovascular, respiratory, GI, , musculoskeletal, integumentary, neurologic, endocrine, hematologic and allergic/immunologic are negative except for what is described above. PHYSICAL EXAMINATION:  VITAL SIGNS:  Her blood pressure was 120/84 with a heart rate of 70 and regular. Respirations were 18. O2 saturation was 97%. Weight 150 pounds. GENERAL:  She is a pleasant 55-year-old female. Denied pain. She was oriented to person, place and time. Answered questions appropriately. SKIN:  No unusual skin changes. HEENT:  The pupils are equally round and intact. Mucous membranes were dry. NECK:  No JVD. Good carotid pulses. She has a very soft left carotid bruit. No lymphadenopathy or thyromegaly. CARDIOVASCULAR EXAM:  S1 and S2 were normal.  No S3 or S4. Soft systolic blowing type murmur. No diastolic murmur. PMI was normal.  No lift, thrust, or pericardial friction rub. LUNGS:  Quite clear to auscultation and percussion. ABDOMEN:  Soft and nontender. Good bowel sounds. EXTREMITIES:  Good femoral pulses. Good pedal pulses. No pedal edema. Skin was warm and dry. No calf tenderness. Nail beds pink. Good cap refill. PULSES:  Bilateral symmetrical radial, brachial and carotid pulses. Good femoral and pedal pulses. NEUROLOGIC EXAM:  Within normal limits. PSYCHIATRIC EXAM:  Within normal limits. IMPRESSION:  1.  New onset of chest pain, loss of energy, and shortness of breath, starting approximately 2 months ago, consistent with angina. 2.  Abnormal treadmill Myoview stress test, with anterior wall ischemia, with a low to intermediate risk of coronary artery disease. 3.  Normal LV function by bedside echocardiogram.  4.  Episode of left jaw pain with garbled speech, lasting approximately 30 to 45 minutes approximately 1 week ago, unknown etiology but with the garbled speech cannot rule out a TIA. 5.  Continued smoking abuse. 6.  Significant family history of coronary artery disease. 7.  Hyperlipidemia. PLAN:  1. Doppler ultrasound of her carotid arteries because of her episode 1 week ago of garbled speech. 2.  We will place her on beta-blocker and we will plan on proceeding on with a cardiac catheterization since her anginal symptoms are continuing. DISCUSSION:  Mrs. Earle Luther continues to have her chest pain, shortness of breath and loss of energy. She is on medical therapy but her symptoms have not changed. With her strong risk factors of coronary artery disease, we have to assume that this is anginal equivalent. Options include starting in cardiac rehab versus proceeding with a cardiac catheterization. She would like to proceed with a cardiac catheterization. She does struggle with anxiety and the uncertainty of her cardiac situation is very unsettling. She would, therefore, like to clarify her anatomy, which is reasonable with her present symptoms. I added Coreg 3.125 mg daily. We had previously placed her on a low dose of Lopressor, but she developed marked fatigue with Lopressor, and therefore, we will start with a low dose of Coreg. Her episode 1 week ago of \"garbled speech\" with severe left jaw pain is concerning and we will rule out carotid etiology with carotid ultrasounds. If she would have another episode, such as this, I would do a brain MRI. I have gone over in detail cardiac catheterizations. We will proceed and we will do so in [place]. I will keep you informed of its result. Thank you very much for allowing me the privilege of seeing Mrs. Earle Luther. If you have any questions on my thoughts, please do not hesitate to contact me.     Sincerely,        Miranda Kaye    D: 07/29/2020 4:10:33     T: 07/29/2020 10:46:04     [id number]  Job#: [id number]   Doc#: [id number]

## 2020-07-28 NOTE — PROGRESS NOTES
Ov Dr. Courtney Najjar for one month follow up   Had episode of instant left jaw pain/severe  Speech was garbled-nausea-cold sweats  Lasted non stop for 45 minutes.   Stopped as soon as it started   Was very weak the next day   Has not happened since then   Has had neck and back pain few times  Left foot ankle has been swelling   No sob   No dizziness   Energy level no changes  Down to 4-5 cig a day         Will set up for carotid u/s    Will add Coreg 3.125 mg- start with  One tablet daily if tolerable increase to one tablet twice a day     Will proceed with heart cath on Aug 21

## 2020-07-31 ENCOUNTER — OFFICE VISIT (OUTPATIENT)
Dept: FAMILY MEDICINE CLINIC | Age: 50
End: 2020-07-31
Payer: COMMERCIAL

## 2020-07-31 ENCOUNTER — HOSPITAL ENCOUNTER (OUTPATIENT)
Dept: VASCULAR LAB | Age: 50
Discharge: HOME | End: 2020-08-02
Payer: COMMERCIAL

## 2020-07-31 VITALS
BODY MASS INDEX: 24.11 KG/M2 | SYSTOLIC BLOOD PRESSURE: 110 MMHG | WEIGHT: 150 LBS | HEIGHT: 66 IN | DIASTOLIC BLOOD PRESSURE: 70 MMHG

## 2020-07-31 PROCEDURE — 99214 OFFICE O/P EST MOD 30 MIN: CPT | Performed by: FAMILY MEDICINE

## 2020-07-31 PROCEDURE — 93880 EXTRACRANIAL BILAT STUDY: CPT

## 2020-07-31 NOTE — PROGRESS NOTES
Name: Valdez Darby  : 1970         Chief Complaint:     Chief Complaint   Patient presents with    Anxiety       History of Present Illness:      Valdez Darby is a 48 y.o.  female who presents with Anxiety      HPI     Has been having pain in center of chest, upper back, down LUE. One episode of severe L jaw pain (completely different from her chronic R TMJ pain) which caused her speech to be garbled despite thinking clearly at the time. Pain lasted about 45 minutes and then suddenly resolved. Started coreg this morning. Cath . Amlodipine has helped with the pain, happening less frequently, although the jaws pain actually happened while on the norvasc. Fibrocystic breast disease dx a number of yrs ago when she had found a lump on Breast, large enough that she could actually see it. Anxiety uncontrolled r/t chest pain and upcoming workup. Sleep has been terrible, takes forever to fall asleep and then has been getting up and eating in her sleep. Taking 2 ativan pills at night as well as seroquel 50 mg nightly (cut back from 75 d/t weight gain). Sleeping on couch because her panic attacks were scaring her  when she would wake up gasping. Klonopin and paxil QAM. No klonopin on weekends. Past Medical History:     Past Medical History:   Diagnosis Date    Anxiety         Past Surgical History:     Past Surgical History:   Procedure Laterality Date    LAPAROSCOPY          Medications:       Prior to Admission medications    Medication Sig Start Date End Date Taking?  Authorizing Provider   Cholecalciferol (VITAMIN D) 50 MCG ( UT) CAPS capsule Take 2,000 Units by mouth daily   Yes Historical Provider, MD   carvedilol (COREG) 3.125 MG tablet Take 1 tablet by mouth 2 times daily Start with one daily- if tolerable increase to two a day 20  Yes Randon Rinne, MD   clonazePAM (KLONOPIN) 0.5 MG tablet Take 1-2 tablets by mouth daily as needed for Anxiety 7/14/20 10/12/20 Yes Behavior normal.         Judgment: Judgment normal.      Comments: Good eye contact, normal judgment, conversant         Data:     Lab Results   Component Value Date     06/08/2020    K 4.1 06/08/2020     06/08/2020    CO2 28 06/08/2020    BUN 11 06/08/2020    CREATININE 0.88 06/08/2020    GLUCOSE 103 06/08/2020    PROT 7.9 06/08/2020    LABALBU 4.7 06/08/2020    BILITOT 0.69 06/08/2020    ALKPHOS 104 06/08/2020    AST 14 06/08/2020    ALT 9 06/08/2020     Lab Results   Component Value Date    WBC 7.0 06/08/2020    RBC 4.86 06/08/2020    HGB 14.8 06/08/2020    HCT 42.7 06/08/2020    MCV 87.9 06/08/2020    MCH 30.5 06/08/2020    MCHC 34.7 06/08/2020    RDW 11.4 06/08/2020     06/08/2020    MPV NOT REPORTED 06/08/2020     Lab Results   Component Value Date    TSH 0.41 06/08/2020     Lab Results   Component Value Date    CHOL 219 06/08/2020    HDL 56 06/08/2020         Assessment & Plan:        Diagnosis Orders   1. Angina pectoris ([address]     2. Anxiety     3. Secondary insomnia     4. Visit for screening mammogram  TANI DIGITAL SCREEN W OR WO CAD BILATERAL   5. Screening for colorectal cancer  Cinthia Valdes MD, Internal Medicine, [facility]   Angina improved on current meds, just read Coreg this morning. Continue work-up with cardiology including upcoming cath in 3 weeks. Discussed findings and reassured patient that if her cath is clean, we will investigate things further to identify the cause of her chest pain. Anxiety uncontrolled related to current situation. Offered increase of Paxil but patient declined due to possible weight gain. I offered increase of Seroquel as she stated the same. She did also ask about particular sleep medication, mentioned Ambien. Advised of possible complex sleep behaviors, which she is doing even without Ambien, and she declined. Again continue same Rx. Follow-up 6 months.         Requested Prescriptions      No prescriptions requested or ordered in this encounter       Patient Instructions   SURVEY:    You may be receiving a survey from [email or web address] regarding your visit today. You may get this in the mail, through your MyChart or in your email. Please complete the survey to enable us to provide the highest quality of care to you and your family. If you cannot score us as very good ( 5 Stars) on any question, please feel free to call the office to discuss how we could have made your experience exceptional.     Thank you. Clinical Care Team:  DO Nany Morgan, [address] [place] [address] Team:  10 Matt Cobos received counseling on the following healthy behaviors: medication adherence  Reviewed prior labs and health maintenance. Continue current medications, diet and exercise. Discussed use, benefit, and side effects of prescribed medications. Barriers to medication compliance addressed. Patient given educational materials - see patient instructions. All patient questions answered. Patient voiced understanding.      Electronically signed by Seth Ingram DO on 8/2/2020 at 12:44 AM   [address]  [address] [place] [id number]  Dept: [phone]

## 2020-07-31 NOTE — PATIENT INSTRUCTIONS
SURVEY:    You may be receiving a survey from [employer] regarding your visit today. You may get this in the mail, through your MyChart or in your email. Please complete the survey to enable us to provide the highest quality of care to you and your family. If you cannot score us as very good ( 5 Stars) on any question, please feel free to call the office to discuss how we could have made your experience exceptional.     Thank you.     Clinical Care Team:  DO Vicki Burch, [address] [place] [address] Team:  [address] [place] [address]

## 2020-08-02 ASSESSMENT — ENCOUNTER SYMPTOMS: RESPIRATORY NEGATIVE: 1

## 2020-08-04 NOTE — PROGRESS NOTES
Garret Beard M.D. [address] [place] [address] Rd, [facility] [place] [facility]  [phone]        2020        Jodi Mckay MD  [address], [place] [address]    RE:   Mariaelena Valadez  :  1971    Dear Dr. Tiffany Snyder:    CHIEF COMPLAINT:  1. Continued angina. 2.  Abnormal Lexiscan Cardiolite stress test.  3.  Tremendous family history of coronary artery disease. HISTORY OF PRESENT ILLNESS:  I had the pleasure of seeing Mrs. Perri Najera in our office on 2020. She is a pleasant 79-year-old female who has multiple risk factors of CAD including hyperlipidemia and a very strong family history of coronary artery disease and continued smoking abuse. I had seen her on 2018. At that time, she was having chest pain and her echocardiogram was normal, showing EF of 60%, and a Myoview cardiac stress test on 2018, was normal.    In approximately 2020, she began developing chest tightness and heaviness radiating to her left arm, which occurred multiple times during the day. It was not particularly associated with activity, although it did come also with activity. It could last anywhere from minutes to a half an hour. She also developed diaphoresis that occurred spontaneously. Energy level had been lower. I saw her then in consult on 2020. We ordered a treadmill Myoview stress test, which was done on . This was abnormal showing a perfusion defect anterior region, consistent with ischemia with an EF of 62%, with overall low to intermediate risk of coronary artery disease. We placed her on medical therapy with Norvasc and brought her back for reevaluation today. She also did start Nicoderm patch somewhat reluctantly but has been wearing this. She is down to approximately 6 cigarettes per day. Approximately a week ago, she had an episode of severe left jaw pain.   Her speech was somewhat garbled during that integumentary, neurologic, endocrine, hematologic and allergic/immunologic are negative except for what is described above. PHYSICAL EXAMINATION:  VITAL SIGNS:  Her blood pressure was 120/84 with a heart rate of 70 and regular. Respirations were 18. O2 saturation was 97%. Weight 150 pounds. GENERAL:  She is a pleasant 69-year-old female. Denied pain. She was oriented to person, place and time. Answered questions appropriately. SKIN:  No unusual skin changes. HEENT:  The pupils are equally round and intact. Mucous membranes were dry. NECK:  No JVD. Good carotid pulses. She has a very soft left carotid bruit. No lymphadenopathy or thyromegaly. CARDIOVASCULAR EXAM:  S1 and S2 were normal.  No S3 or S4. Soft systolic blowing type murmur. No diastolic murmur. PMI was normal.  No lift, thrust, or pericardial friction rub. LUNGS:  Quite clear to auscultation and percussion. ABDOMEN:  Soft and nontender. Good bowel sounds. EXTREMITIES:  Good femoral pulses. Good pedal pulses. No pedal edema. Skin was warm and dry. No calf tenderness. Nail beds pink. Good cap refill. PULSES:  Bilateral symmetrical radial, brachial and carotid pulses. Good femoral and pedal pulses. NEUROLOGIC EXAM:  Within normal limits. PSYCHIATRIC EXAM:  Within normal limits. IMPRESSION:  1.  New onset of chest pain, loss of energy, and shortness of breath, starting approximately 2 months ago, consistent with angina. 2.  Abnormal treadmill Myoview stress test, with anterior wall ischemia, with a low to intermediate risk of coronary artery disease. 3.  Normal LV function by bedside echocardiogram.  4.  Episode of left jaw pain with garbled speech, lasting approximately 30 to 45 minutes approximately 1 week ago, unknown etiology but with the garbled speech cannot rule out a TIA. 5.  Continued smoking abuse. 6.  Significant family history of coronary artery disease. 7.  Hyperlipidemia. PLAN:  1.   Doppler ultrasound of her carotid arteries because of her episode 1 week ago of garbled speech. 2.  We will place her on beta-blocker and we will plan on proceeding on with a cardiac catheterization since her anginal symptoms are continuing. DISCUSSION:  Mrs. Aiyana Walker continues to have her chest pain, shortness of breath and loss of energy. She is on medical therapy but her symptoms have not changed. With her strong risk factors of coronary artery disease, we have to assume that this is anginal equivalent. Options include starting in cardiac rehab versus proceeding with a cardiac catheterization. She would like to proceed with a cardiac catheterization. She does struggle with anxiety and the uncertainty of her cardiac situation is very unsettling. She would, therefore, like to clarify her anatomy, which is reasonable with her present symptoms. I added Coreg 3.125 mg daily. We had previously placed her on a low dose of Lopressor, but she developed marked fatigue with Lopressor, and therefore, we will start with a low dose of Coreg. Her episode 1 week ago of \"garbled speech\" with severe left jaw pain is concerning and we will rule out carotid etiology with carotid ultrasounds. If she would have another episode, such as this, I would do a brain MRI. I have gone over in detail cardiac catheterizations. We will proceed and we will do so in [place]. I will keep you informed of its result. Thank you very much for allowing me the privilege of seeing Mrs. Aiyana Walker. If you have any questions on my thoughts, please do not hesitate to contact me.     Sincerely,        Alek Correa    D: 07/29/2020 4:10:33     T: 07/29/2020 10:46:04     [id number]  Job#: [id number]   Doc#: [id number]

## 2020-08-17 ENCOUNTER — HOSPITAL ENCOUNTER (OUTPATIENT)
Age: 50
Discharge: HOME | End: 2020-08-17
Payer: COMMERCIAL

## 2020-08-17 LAB
ABSOLUTE EOS #: 0.2 K/UL (ref 0–0.4)
ABSOLUTE IMMATURE GRANULOCYTE: NORMAL K/UL (ref 0–0.3)
ABSOLUTE LYMPH #: 2.2 K/UL (ref 1–4.8)
ABSOLUTE MONO #: 0.3 K/UL (ref 0–1)
ALBUMIN SERPL-MCNC: 4.4 G/DL (ref 3.5–5.2)
ALBUMIN/GLOBULIN RATIO: ABNORMAL (ref 1–2.5)
ALP BLD-CCNC: 102 U/L (ref 35–104)
ALT SERPL-CCNC: 11 U/L (ref 5–33)
ANION GAP SERPL CALCULATED.3IONS-SCNC: 9 MMOL/L (ref 9–17)
AST SERPL-CCNC: 18 U/L
BASOPHILS # BLD: 1 % (ref 0–2)
BASOPHILS ABSOLUTE: 0 K/UL (ref 0–0.2)
BILIRUB SERPL-MCNC: 0.74 MG/DL (ref 0.3–1.2)
BUN BLDV-MCNC: 9 MG/DL (ref 6–20)
BUN/CREAT BLD: 9 (ref 9–20)
CALCIUM SERPL-MCNC: 9.8 MG/DL (ref 8.6–10.4)
CHLORIDE BLD-SCNC: 103 MMOL/L (ref 98–107)
CHOLESTEROL/HDL RATIO: 3.9
CHOLESTEROL: 234 MG/DL
CO2: 25 MMOL/L (ref 20–31)
CREAT SERPL-MCNC: 1.01 MG/DL (ref 0.5–0.9)
DIFFERENTIAL TYPE: YES
EOSINOPHILS RELATIVE PERCENT: 3 % (ref 0–5)
GFR AFRICAN AMERICAN: >60 ML/MIN
GFR NON-AFRICAN AMERICAN: 58 ML/MIN
GFR SERPL CREATININE-BSD FRML MDRD: ABNORMAL ML/MIN/{1.73_M2}
GFR SERPL CREATININE-BSD FRML MDRD: ABNORMAL ML/MIN/{1.73_M2}
GLUCOSE BLD-MCNC: 76 MG/DL (ref 70–99)
HCT VFR BLD CALC: 43.1 % (ref 36–46)
HDLC SERPL-MCNC: 60 MG/DL
HEMOGLOBIN: 14.6 G/DL (ref 12–16)
IMMATURE GRANULOCYTES: NORMAL %
LDL CHOLESTEROL: 151 MG/DL (ref 0–130)
LYMPHOCYTES # BLD: 36 % (ref 15–40)
MCH RBC QN AUTO: 29.6 PG (ref 26–34)
MCHC RBC AUTO-ENTMCNC: 33.9 G/DL (ref 31–37)
MCV RBC AUTO: 87.3 FL (ref 80–100)
MONOCYTES # BLD: 5 % (ref 4–8)
NRBC AUTOMATED: NORMAL PER 100 WBC
PATIENT FASTING?: YES
PDW BLD-RTO: 12.7 % (ref 12.1–15.2)
PLATELET # BLD: 322 K/UL (ref 140–450)
PLATELET ESTIMATE: NORMAL
PMV BLD AUTO: NORMAL FL (ref 6–12)
POTASSIUM SERPL-SCNC: 4.3 MMOL/L (ref 3.7–5.3)
RBC # BLD: 4.94 M/UL (ref 4–5.2)
RBC # BLD: NORMAL 10*6/UL
SEG NEUTROPHILS: 55 % (ref 47–75)
SEGMENTED NEUTROPHILS ABSOLUTE COUNT: 3.5 K/UL (ref 2.5–7)
SODIUM BLD-SCNC: 137 MMOL/L (ref 135–144)
TOTAL PROTEIN: 8.6 G/DL (ref 6.4–8.3)
TRIGL SERPL-MCNC: 113 MG/DL
VLDLC SERPL CALC-MCNC: ABNORMAL MG/DL (ref 1–30)
WBC # BLD: 6.3 K/UL (ref 3.5–11)
WBC # BLD: NORMAL 10*3/UL

## 2020-08-17 PROCEDURE — 36415 COLL VENOUS BLD VENIPUNCTURE: CPT

## 2020-08-17 PROCEDURE — 93005 ELECTROCARDIOGRAM TRACING: CPT

## 2020-08-17 PROCEDURE — 80061 LIPID PANEL: CPT

## 2020-08-17 PROCEDURE — 85025 COMPLETE CBC W/AUTO DIFF WBC: CPT

## 2020-08-17 PROCEDURE — 80053 COMPREHEN METABOLIC PANEL: CPT

## 2020-08-18 LAB
EKG ATRIAL RATE: 70 BPM
EKG P AXIS: 74 DEGREES
EKG P-R INTERVAL: 152 MS
EKG Q-T INTERVAL: 426 MS
EKG QRS DURATION: 144 MS
EKG QTC CALCULATION (BAZETT): 460 MS
EKG R AXIS: 83 DEGREES
EKG T AXIS: -2 DEGREES
EKG VENTRICULAR RATE: 70 BPM

## 2020-08-18 PROCEDURE — 93010 ELECTROCARDIOGRAM REPORT: CPT | Performed by: INTERNAL MEDICINE

## 2020-09-11 ENCOUNTER — HOSPITAL ENCOUNTER (OUTPATIENT)
Dept: MAMMOGRAPHY | Age: 50
Discharge: HOME | End: 2020-09-13
Payer: COMMERCIAL

## 2020-09-11 PROCEDURE — 77067 SCR MAMMO BI INCL CAD: CPT

## 2020-09-14 RX ORDER — NICOTINE 21 MG/24HR
1 PATCH, TRANSDERMAL 24 HOURS TRANSDERMAL DAILY
Qty: 14 PATCH | Refills: 5 | Status: SHIPPED | OUTPATIENT
Start: 2020-09-14 | End: 2021-10-06 | Stop reason: ALTCHOICE

## 2020-09-15 ENCOUNTER — TELEPHONE (OUTPATIENT)
Dept: FAMILY MEDICINE CLINIC | Age: 50
End: 2020-09-15

## 2020-09-15 NOTE — TELEPHONE ENCOUNTER
----- Message from Lorraine Campbell DO sent at 9/15/2020 11:03 AM EDT -----  3 areas in the upper left breast that look like they are probably cysts, but they have changed since the last imaging she had which was 10 years ago. Recommend diagnostic mammogram and ultrasound to get a better look.

## 2020-09-17 ENCOUNTER — TELEPHONE (OUTPATIENT)
Dept: FAMILY MEDICINE CLINIC | Age: 50
End: 2020-09-17

## 2020-09-17 ENCOUNTER — HOSPITAL ENCOUNTER (OUTPATIENT)
Dept: MAMMOGRAPHY | Age: 50
Discharge: HOME | End: 2020-09-19
Payer: COMMERCIAL

## 2020-09-17 ENCOUNTER — HOSPITAL ENCOUNTER (OUTPATIENT)
Dept: ULTRASOUND IMAGING | Age: 50
Discharge: HOME | End: 2020-09-19
Payer: COMMERCIAL

## 2020-09-17 PROCEDURE — 76641 ULTRASOUND BREAST COMPLETE: CPT

## 2020-09-17 PROCEDURE — 77065 DX MAMMO INCL CAD UNI: CPT

## 2020-09-17 NOTE — TELEPHONE ENCOUNTER
----- Message from Mariela Navarrete DO sent at 9/17/2020  4:24 PM EDT -----  Findings discussed with pt by radiology - multiple fibroadenomas which appear benign. Repeat mamm and US in 6 mos.

## 2020-10-05 ENCOUNTER — OFFICE VISIT (OUTPATIENT)
Dept: FAMILY MEDICINE CLINIC | Age: 50
End: 2020-10-05
Payer: COMMERCIAL

## 2020-10-05 VITALS
WEIGHT: 157 LBS | TEMPERATURE: 97.6 F | BODY MASS INDEX: 25.23 KG/M2 | SYSTOLIC BLOOD PRESSURE: 122 MMHG | OXYGEN SATURATION: 97 % | HEART RATE: 74 BPM | HEIGHT: 66 IN | DIASTOLIC BLOOD PRESSURE: 70 MMHG

## 2020-10-05 PROCEDURE — 99213 OFFICE O/P EST LOW 20 MIN: CPT | Performed by: FAMILY MEDICINE

## 2020-10-05 NOTE — PROGRESS NOTES
MOUTH EVERY 8 HOURS AS NEEDED for anxiety 7/14/20 10/12/20 Yes Fam Nieto DO   PARoxetine (PAXIL) 10 MG tablet Take 1 tablet by mouth daily 5/14/20  Yes Fam Nieto DO   nicotine (NICODERM CQ) 14 MG/24HR Place 1 patch onto the skin daily for 14 days 9/14/20 9/28/20  Genette Bloch, MD   nicotine (NICODERM CQ) 7 MG/24HR Place 1 patch onto the skin daily for 14 days 6/9/20 7/31/20  Genette Bloch, MD   QUEtiapine (SEROQUEL) 50 MG tablet Take 1.5 tablets by mouth nightly 5/14/20   Fam Nieto DO        Allergies:       Codeine; Effexor [venlafaxine]; and Xanax [alprazolam]    Social History:     Tobacco:    reports that she has been smoking. She has been smoking about 0.50 packs per day. She has never used smokeless tobacco.  Alcohol:      reports current alcohol use. Drug Use:  reports no history of drug use. Family History:     No family history on file. Review of Systems:     Positive and Negative as described in HPI    Review of Systems   Constitutional: Negative. Respiratory: Negative. Gastrointestinal: Negative. Physical Exam:     Vitals:  /70   Pulse 74   Temp 97.6 °F (36.4 °C)   Ht 5' 6\" (1.676 m)   Wt 157 lb (71.2 kg)   LMP 04/05/2014   SpO2 97%   BMI 25.34 kg/m²   Physical Exam  Vitals signs and nursing note reviewed. Constitutional:       General: She is not in acute distress. Appearance: Normal appearance. She is well-developed. She is not ill-appearing. Pulmonary:      Effort: Pulmonary effort is normal.   Chest:          Comments: Left axilla and axillary tail of breast tender but without any discrete masses or lymphadenopathy present  Skin:     General: Skin is warm and dry. Neurological:      Mental Status: She is alert and oriented to person, place, and time.    Psychiatric:         Mood and Affect: Mood normal.         Behavior: Behavior normal.         Judgment: Judgment normal.         Data:     Lab Results   Component Value Date     08/17/2020    K 4.3 08/17/2020     08/17/2020    CO2 25 08/17/2020    BUN 9 08/17/2020    CREATININE 1.01 08/17/2020    GLUCOSE 76 08/17/2020    PROT 8.6 08/17/2020    LABALBU 4.4 08/17/2020    BILITOT 0.74 08/17/2020    ALKPHOS 102 08/17/2020    AST 18 08/17/2020    ALT 11 08/17/2020     Lab Results   Component Value Date    WBC 6.3 08/17/2020    RBC 4.94 08/17/2020    HGB 14.6 08/17/2020    HCT 43.1 08/17/2020    MCV 87.3 08/17/2020    MCH 29.6 08/17/2020    MCHC 33.9 08/17/2020    RDW 12.7 08/17/2020     08/17/2020    MPV NOT REPORTED 08/17/2020     Lab Results   Component Value Date    TSH 0.41 06/08/2020     Lab Results   Component Value Date    CHOL 234 08/17/2020    HDL 60 08/17/2020 9/11/20 screening mammogram  FINDINGS: Two views of each breast show heterogeneously dense fibroglandular    tissue, which may obscure small masses.         COMPARISON: Left breast ultrasound [facility] 5/25/2010, bilateral    mammogram 5/24/2010. A breast ultrasound performed same date showed multiple    cysts bilaterally.         3 masses are present in the superior left breast measuring between 1.1 and 2.2    cm diameter with fairly well-defined borders. These may represents cysts. Ultrasound and diagnostic mammogram is recommended for further evaluation.         Right breast normal and unchanged.         No abnormal calcifications. 9/17/2020 diagnostic left mammogram and left breast ultrasound     FINDINGS: Compression CC and MLO views and a 90-degree mediolateral view along    with a left breast ultrasound shows multiple fibroadenomas in the left breast    the largest measuring 1.8 cm diameter at 3 o'clock 3 cm from the nipple.         Retroareolar fibroadenoma 1.0 x 0.9 x 0.5 cm.  At 12 o'clock 1 cm from the    nipple a fibroadenoma measures 1.2 cm in greatest diameter.         These areas are all round to elliptical with well-defined borders, internal    echoes without significant color Doppler flow and no shadowing.         The axilla was normal.         A confluence of lobules at 12 o'clock 2 cm from the nipple was scanned by    myself and this is not a discrete structure, or suspicious. Assessment & Plan:        Diagnosis Orders   1. Pain of left breast  External Referral To General Surgery   2. Fibroadenoma of breast, left  External Referral To General Surgery   Left breast pain related to masses which appeared on imaging to be fibroadenomas. These are causing significant pain in the breast, axilla, and medial upper arm. Pt seeking excision. Referred to surgeon for further eval.         Requested Prescriptions      No prescriptions requested or ordered in this encounter       There are no Patient Instructions on file for this visit. [place] received counseling on the following healthy behaviors: medication adherence  Reviewed prior labs and health maintenance. Continue current medications, diet and exercise. Discussed use, benefit, and side effects of prescribed medications. Barriers to medication compliance addressed. Patient given educational materials - see patient instructions. All patient questions answered. Patient voiced understanding.      Electronically signed by Zandra Yin DO on 10/6/2020 at 8:08 AM   [address] [id number]  Dept: [phone]

## 2020-10-06 ASSESSMENT — ENCOUNTER SYMPTOMS
RESPIRATORY NEGATIVE: 1
GASTROINTESTINAL NEGATIVE: 1

## 2020-10-07 ENCOUNTER — APPOINTMENT (OUTPATIENT)
Dept: GENERAL RADIOLOGY | Age: 50
End: 2020-10-07
Payer: COMMERCIAL

## 2020-10-07 ENCOUNTER — HOSPITAL ENCOUNTER (EMERGENCY)
Age: 50
Discharge: HOME | End: 2020-10-07
Attending: INTERNAL MEDICINE
Payer: COMMERCIAL

## 2020-10-07 VITALS
TEMPERATURE: 98.2 F | BODY MASS INDEX: 24.3 KG/M2 | HEIGHT: 66 IN | SYSTOLIC BLOOD PRESSURE: 119 MMHG | WEIGHT: 151.2 LBS | HEART RATE: 73 BPM | OXYGEN SATURATION: 98 % | RESPIRATION RATE: 18 BRPM | DIASTOLIC BLOOD PRESSURE: 77 MMHG

## 2020-10-07 PROCEDURE — 6360000002 HC RX W HCPCS: Performed by: INTERNAL MEDICINE

## 2020-10-07 PROCEDURE — 12001 RPR S/N/AX/GEN/TRNK 2.5CM/<: CPT

## 2020-10-07 PROCEDURE — 99283 EMERGENCY DEPT VISIT LOW MDM: CPT

## 2020-10-07 PROCEDURE — 6370000000 HC RX 637 (ALT 250 FOR IP): Performed by: INTERNAL MEDICINE

## 2020-10-07 PROCEDURE — 73140 X-RAY EXAM OF FINGER(S): CPT

## 2020-10-07 PROCEDURE — 90471 IMMUNIZATION ADMIN: CPT | Performed by: INTERNAL MEDICINE

## 2020-10-07 PROCEDURE — 64450 NJX AA&/STRD OTHER PN/BRANCH: CPT

## 2020-10-07 PROCEDURE — 99284 EMERGENCY DEPT VISIT MOD MDM: CPT

## 2020-10-07 PROCEDURE — 90715 TDAP VACCINE 7 YRS/> IM: CPT | Performed by: INTERNAL MEDICINE

## 2020-10-07 RX ORDER — IBUPROFEN 600 MG/1
600 TABLET ORAL 4 TIMES DAILY PRN
Qty: 40 TABLET | Refills: 0 | Status: SHIPPED | OUTPATIENT
Start: 2020-10-07

## 2020-10-07 RX ORDER — DIAPER,BRIEF,INFANT-TODD,DISP
EACH MISCELLANEOUS ONCE
Status: COMPLETED | OUTPATIENT
Start: 2020-10-07 | End: 2020-10-07

## 2020-10-07 RX ORDER — CEPHALEXIN 250 MG/1
250 CAPSULE ORAL 4 TIMES DAILY
Qty: 40 CAPSULE | Refills: 0 | Status: SHIPPED | OUTPATIENT
Start: 2020-10-07 | End: 2020-10-17

## 2020-10-07 RX ORDER — HYDROCODONE BITARTRATE AND ACETAMINOPHEN 5; 325 MG/1; MG/1
1 TABLET ORAL EVERY 6 HOURS PRN
Qty: 6 TABLET | Refills: 0 | Status: SHIPPED | OUTPATIENT
Start: 2020-10-07 | End: 2020-10-10

## 2020-10-07 RX ORDER — HYDROCODONE BITARTRATE AND ACETAMINOPHEN 5; 325 MG/1; MG/1
1 TABLET ORAL ONCE
Status: COMPLETED | OUTPATIENT
Start: 2020-10-07 | End: 2020-10-07

## 2020-10-07 RX ADMIN — HYDROCODONE BITARTRATE AND ACETAMINOPHEN 1 TABLET: 5; 325 TABLET ORAL at 10:13

## 2020-10-07 RX ADMIN — TETANUS TOXOID, REDUCED DIPHTHERIA TOXOID AND ACELLULAR PERTUSSIS VACCINE, ADSORBED 0.5 ML: 5; 2.5; 8; 8; 2.5 SUSPENSION INTRAMUSCULAR at 10:12

## 2020-10-07 RX ADMIN — BACITRACIN: 500 OINTMENT TOPICAL at 10:13

## 2020-10-07 ASSESSMENT — PAIN SCALES - GENERAL
PAINLEVEL_OUTOF10: 6
PAINLEVEL_OUTOF10: 6

## 2020-10-07 ASSESSMENT — PAIN DESCRIPTION - LOCATION: LOCATION: FINGER (COMMENT WHICH ONE)

## 2020-10-07 ASSESSMENT — PAIN DESCRIPTION - ORIENTATION: ORIENTATION: RIGHT

## 2020-10-07 ASSESSMENT — PAIN DESCRIPTION - PAIN TYPE: TYPE: ACUTE PAIN

## 2020-10-07 NOTE — ED PROVIDER NOTES
List as of 10/7/2020 10:08 AM      CONTINUE these medications which have NOT CHANGED    Details   nicotine (NICODERM CQ) 14 MG/24HR Place 1 patch onto the skin daily for 14 days, Disp-14 patch,R-5We are requesting this refill to have on file for next month s order. Normal      Cholecalciferol (VITAMIN D) 50 MCG (2000 UT) CAPS capsule Take 2,000 Units by mouth dailyHistorical Med      clonazePAM (KLONOPIN) 0.5 MG tablet Take 1-2 tablets by mouth daily as needed for Anxiety, Disp-60 tablet,R-2Normal      LORazepam (ATIVAN) 1 MG tablet TAKE 1 TABLET BY MOUTH EVERY 8 HOURS AS NEEDED for anxiety, Disp-60 tablet,R-2Normal      QUEtiapine (SEROQUEL) 50 MG tablet Take 1.5 tablets by mouth nightly, Disp-135 tablet, R-1Normal      PARoxetine (PAXIL) 10 MG tablet Take 1 tablet by mouth daily, Disp-90 tablet, R-1Normal      nicotine (NICODERM CQ) 7 MG/24HR Place 1 patch onto the skin daily for 14 days, Disp-14 patch,R-0Normal             ALLERGIES     Codeine; Effexor [venlafaxine]; and Xanax [alprazolam]    FAMILY HISTORY     History reviewed. No pertinent family history. SOCIAL HISTORY       Social History     Socioeconomic History    Marital status:      Spouse name: None    Number of children: None    Years of education: None    Highest education level: None   Occupational History    None   Social Needs    Financial resource strain: Not hard at all   [employer] insecurity     Worry: Never true     Inability: Never true   [employer] needs     Medical: None     Non-medical: None   Tobacco Use    Smoking status: Current Every Day Smoker     Packs/day: 0.50    Smokeless tobacco: Never Used   Substance and Sexual Activity    Alcohol use:  Yes    Drug use: No    Sexual activity: None   Lifestyle    Physical activity     Days per week: None     Minutes per session: None    Stress: None   Relationships    Social connections     Talks on phone: None     Gets together: None     Attends Yazdanism service: None     Active member of club or organization: None     Attends meetings of clubs or organizations: None     Relationship status: None    Intimate partner violence     Fear of current or ex partner: None     Emotionally abused: None     Physically abused: None     Forced sexual activity: None   Other Topics Concern    None   Social History Narrative    None       SCREENINGS    Jai Coma Scale  Eye Opening: Spontaneous  Best Verbal Response: Oriented  Best Motor Response: Obeys commands  [place] Coma Scale Score: 15        PHYSICAL EXAM    (up to 7 for level 4, 8 or more for level 5)     ED Triage Vitals   BP Temp Temp src Pulse Resp SpO2 Height Weight   -- -- -- -- -- -- -- --       Physical Exam  Physical Exam   Constitutional:  Appears well, well-developed and well-nourished. No distress noted. Non toxic in appearance. HENT:     Head: Normocephalic and atraumatic. Mouth/Throat: Oropharynx is clear and mucosa moist. No oropharyngeal exudate noted. Posterior pharynx is pink and noninjected. Eyes: Conjunctivae and EOM are normal. Pupils are equal, round, and reactive to light. No scleral icterus. Neck: Normal range of motion. Neck supple. No tracheal deviation present. Cardiovascular: Normal rate, regular rhythm, normal heartsounds and intact distal pulses. Exam reveals no gallop or friction rub. No murmur heard. Pulmonary/Chest: Effort normal and breath sounds are symmetric and normal. No respiratory distress. There are no wheezes, rales or rhonchi. Abdominal: Soft. Bowel sounds are normal. No distension or no mass exhibitted. There is no tenderness, rebound, rigidity or guarding. Genitourinary:   No CVA tenderness noted on examination. Musculoskeletal: Examination of the right hand shows that there is full range of motion of all fingers including flexion, opposition, extension. Lymphadenopathy:  No cervical adenopathy. Neurological:   alert and oriented to person, place, and time. Normal speech, normal comprehension, normal cognition,  Reflexes are normal.  There are no cranial nerve deficits. Normal muscle tone, motor and sensory function including SILT exhibited. Coordination normal and gait normal.  Normal finger to nose testing. Skin: Right second finger tip there is a 1.5 cm laceration involving the pad extending along medial aspect of the finger but does not reach the nail. Adjacent to the nail there are two parallel lacerations 0.5 cm that did not extend into the nailbed after partial nail resection. there was a 50% subungual hematoma along the medial aspect of the nailbed. Bleeding is controlled. Skin is warm and dry. No foreign bodies identified after exploration to a bloodless field and full depth of injury. No rash noted. No diaphoresis. No pallor. Psychiatric: Pleasant and cooperative. Normal mood and affect. Behavior is  normal. Judgment and thought content normal.     DIAGNOSTIC RESULTS     EKG: All EKG's are interpreted by the Emergency Department Physician who either signs or Co-signs this chart in the absence of a cardiologist.    Not indicated. RADIOLOGY:   Non-plain film images such as CT, Ultrasoundand MRI are read by the radiologist. Plain radiographic images are visualized and preliminarily interpreted by the emergency physician with the below findings:    Not indicated. Interpretation per the Radiologist below, if available at the time of this note:    XR FINGER RIGHT (MIN 2 VIEWS)   Final Result      1. Laceration to the tip of the index finger. No radiopaque foreign body. 2. No acute fracture. 2                  ED BEDSIDE ULTRASOUND:   Performed by ED Physician - none    LABS:  Labs Reviewed - No data to display    All other labs were within normal range or not returned as of this dictation.     EMERGENCY DEPARTMENT COURSE and DIFFERENTIAL DIAGNOSIS/MDM:   Vitals:    Vitals:    10/07/20 0826   BP: 119/77   Pulse: 73   Resp: 18   Temp: 98.2 °F (36.8 °C)   TempSrc: Temporal   SpO2: 98%   Weight: 151 lb 3.2 oz (68.6 kg)   Height: 5' 6\" (1.676 m)       Noted    MDM    CRITICAL CARE TIME   Total Critical Care time was 0 minutes      EDCOURSE       CONSULTS:  None    PROCEDURES:  Unless otherwise noted below, none     Lac Repair    Date/Time: 10/8/2020 12:07 AM  Performed by: Ector Rodriguez MD  Authorized by: Ector Rodriguez MD     Consent:     Consent obtained:  Verbal    Consent given by:  Patient    Risks discussed:  Infection, poor cosmetic result, poor wound healing, pain, retained foreign body, need for additional repair, vascular damage and nerve damage  Anesthesia (see MAR for exact dosages):      Anesthesia method:  Local infiltration and nerve block    Local anesthetic:  Lidocaine 1% w/o epi    Block location:  Base of finger    Block needle gauge:  25 G    Block anesthetic:  Lidocaine 1% w/o epi    Block injection procedure:  Introduced needle, negative aspiration for blood, incremental injection, anatomic landmarks identified and anatomic landmarks palpated    Block outcome:  Anesthesia achieved  Laceration details:     Location:  Finger    Finger location:  R index finger    Length (cm):  2    Depth (mm):  3  Repair type:     Repair type:  Simple  Pre-procedure details:     Preparation:  Patient was prepped and draped in usual sterile fashion  Exploration:     Hemostasis achieved with:  Direct pressure    Wound exploration: wound explored through full range of motion      Wound extent: no foreign bodies/material noted, no muscle damage noted, no nerve damage noted, no tendon damage noted, no underlying fracture noted and no vascular damage noted      Contaminated: no    Treatment:     Area cleansed with:  Betadine and saline    Amount of cleaning:  Standard    Irrigation solution:  Sterile saline    Irrigation volume:  500    Irrigation method:  Syringe    Visualized foreign bodies/material removed: no    Skin repair:     Repair method:  Sutures Suture size:  5-0    Suture material:  Nylon    Suture technique:  Simple interrupted    Number of sutures:  6  Approximation:     Approximation:  Close  Post-procedure details:     Dressing:  Splint for protection, antibiotic ointment and bulky dressing    Patient tolerance of procedure: Tolerated well, no immediate complications  Comments:      1.5 cm laceration of finger pad, repaired with 5 interrupted sutures  0.5 cm lac x 2 adjacent to each other, adjacent to nail, not found to extend under nailbed, repaired with single interrupted suture  Nail Removal    Date/Time: 10/8/2020 12:11 AM  Performed by: Toni Ballard MD  Authorized by: Toni Ballard MD     Consent:     Consent obtained:  Verbal    Consent given by:  Patient    Risks discussed:  Bleeding, incomplete removal, infection, pain and permanent nail deformity  Location:     Hand:  R index finger  Pre-procedure details:     Skin preparation:  Betadine    Preparation: Patient was prepped and draped in the usual sterile fashion    Anesthesia (see MAR for exact dosages): Anesthesia method:  Nerve block and local infiltration    Local anesthetic:  Lidocaine 1% w/o epi    Block location:  Base of finger    Block anesthetic:  Lidocaine 1% w/o epi    Block injection procedure:  Anatomic landmarks identified, anatomic landmarks palpated, introduced needle, negative aspiration for blood and incremental injection    Block outcome:  Anesthesia achieved  Nail Removal:     Nail removed:  Partial    Nail side:  Medial    Nail bed repaired: no      Removed nail replaced and anchored: no    Trephination:     Subungual hematoma drained: no    Ingrown nail:     Wedge excision of skin: no      Nail matrix removed or ablated:  None  Nails trimmed:     Number of nails trimmed:  0  Post-procedure details:     Dressing:  Antibiotic ointment and splint    Patient tolerance of procedure:   Tolerated well, no immediate complications          Carie freeman 48 y.o. female presented for right index finger tip laceration with laceration adjacent to the nail bed and subungual hematoma but no laceration of the nailbed itself. Her lacerations were repaired with sutures and she underwent a partial resection of the nail. Tdap booster was provided. Antibiotics analgesics were provided. She is well, well hydrated, nontoxic, hemodynamically stable, neurologically intact and satisfactory for discharge for outpatient management. Findings discussed at length with patient. I advised her that her nail may fall off but because the germinal matrix is not involved it will likely grow back. I explained to her to keep the area clean and dry to prevent infection and encourage proper healing. I encouraged her to keep her hand elevated to reduce pain and throbbing. I instructed the patient to followup with the PCP for evaluation of response to management of acute injury. I instructed the patient to return to the ER if his condition worsens, if there is any concern for altered mental status, difficulty breathing, dehydration or loss of function. Patient Course:        ED Medicationsadministered this visit:    Medications   Tetanus-Diphth-Acell Pertussis (BOOSTRIX) injection 0.5 mL (0.5 mLs Intramuscular Given 10/7/20 1012)   HYDROcodone-acetaminophen (NORCO) 5-325 MG per tablet 1 tablet (1 tablet Oral Given 10/7/20 1013)   bacitracin zinc ointment ( Topical Given 10/7/20 1013)       New Prescriptions from this visit:    Discharge Medication List as of 10/7/2020 10:08 AM      START taking these medications    Details   cephALEXin (KEFLEX) 250 MG capsule Take 1 capsule by mouth 4 times daily for 10 days, Disp-40 capsule,R-0Print      HYDROcodone-acetaminophen (NORCO) 5-325 MG per tablet Take 1 tablet by mouth every 6 hours as needed for Pain for up to 3 days. Intended supply: 3 days.  Take lowest dose possible to manage pain, Disp-6 tablet,R-0Print      ibuprofen (ADVIL;MOTRIN) 600 MG tablet Take 1 tablet by mouth 4 times daily as needed for Pain, Disp-40 tablet,R-0Print             Follow-up:  [facility]  [address] [facility] [address]  [phone]  Go to   As needed, If symptoms worsen    Rizwana García DO  [address] [place] [address]     In 10 days  For suture removal        Final Impression:   1. Laceration of right index finger without foreign body with damage to nail, initial encounter    2. Subungual hematoma of finger, initial encounter    3. Need for Tdap vaccination               (Please note that portions of this note werecompleted with a voice recognition program.  Efforts were made to edit the dictations but occasionally words are mis-transcribed.)    FINAL IMPRESSION      1. Laceration of right index finger without foreign body with damage to nail, initial encounter    2. Subungual hematoma of finger, initial encounter    3. Need for Tdap vaccination          DISPOSITION/PLAN   DISPOSITION        PATIENT REFERRED TO:  [facility]  [address] [place] [id number]  [phone]  Go to   As needed, If symptoms worsen    Rizwana García DO  [address] [facility] 21     In 10 days  For suture removal      DISCHARGE MEDICATIONS:  Discharge Medication List as of 10/7/2020 10:08 AM      START taking these medications    Details   cephALEXin (KEFLEX) 250 MG capsule Take 1 capsule by mouth 4 times daily for 10 days, Disp-40 capsule,R-0Print      HYDROcodone-acetaminophen (NORCO) 5-325 MG per tablet Take 1 tablet by mouth every 6 hours as needed for Pain for up to 3 days. Intended supply: 3 days.  Take lowest dose possible to manage pain, Disp-6 tablet,R-0Print      ibuprofen (ADVIL;MOTRIN) 600 MG tablet Take 1 tablet by mouth 4 times daily as needed for Pain, Disp-40 tablet,R-0Print                (Please note that portions of this note were completed with a voice recognition program.  Efforts were made to edit the dictations but occasionally words are mis-transcribed.)    Billie Victoria MD (electronically signed)  Attending Emergency Physician          Billie Victoria MD  10/08/20 [id number]

## 2020-10-30 RX ORDER — PAROXETINE 10 MG/1
10 TABLET, FILM COATED ORAL DAILY
Qty: 90 TABLET | Refills: 1 | Status: SHIPPED | OUTPATIENT
Start: 2020-10-30 | End: 2021-05-04 | Stop reason: SDUPTHER

## 2020-11-06 ENCOUNTER — PREP FOR PROCEDURE (OUTPATIENT)
Dept: INTERNAL MEDICINE CLINIC | Age: 50
End: 2020-11-06

## 2020-11-06 RX ORDER — SODIUM CHLORIDE, SODIUM LACTATE, POTASSIUM CHLORIDE, CALCIUM CHLORIDE 600; 310; 30; 20 MG/100ML; MG/100ML; MG/100ML; MG/100ML
INJECTION, SOLUTION INTRAVENOUS CONTINUOUS
Status: CANCELLED | OUTPATIENT
Start: 2020-11-06

## 2020-11-06 RX ORDER — SODIUM CHLORIDE 0.9 % (FLUSH) 0.9 %
10 SYRINGE (ML) INJECTION EVERY 12 HOURS SCHEDULED
Status: CANCELLED | OUTPATIENT
Start: 2020-11-06

## 2020-11-06 RX ORDER — SODIUM CHLORIDE 0.9 % (FLUSH) 0.9 %
10 SYRINGE (ML) INJECTION PRN
Status: CANCELLED | OUTPATIENT
Start: 2020-11-06

## 2020-11-09 ENCOUNTER — OFFICE VISIT (OUTPATIENT)
Dept: FAMILY MEDICINE CLINIC | Age: 50
End: 2020-11-09
Payer: COMMERCIAL

## 2020-11-09 VITALS
BODY MASS INDEX: 23.63 KG/M2 | DIASTOLIC BLOOD PRESSURE: 80 MMHG | SYSTOLIC BLOOD PRESSURE: 122 MMHG | HEIGHT: 66 IN | HEART RATE: 76 BPM | WEIGHT: 147 LBS | TEMPERATURE: 97.5 F

## 2020-11-09 PROCEDURE — 99241 PR OFFICE CONSULTATION NEW/ESTAB PATIENT 15 MIN: CPT | Performed by: INTERNAL MEDICINE

## 2020-11-09 RX ORDER — SODIUM, POTASSIUM,MAG SULFATES 17.5-3.13G
SOLUTION, RECONSTITUTED, ORAL ORAL
Qty: 1 BOTTLE | Refills: 0 | Status: ON HOLD | OUTPATIENT
Start: 2020-11-09 | End: 2021-02-05

## 2020-11-09 ASSESSMENT — ENCOUNTER SYMPTOMS
CHEST TIGHTNESS: 0
RHINORRHEA: 0
ABDOMINAL PAIN: 0
BLOOD IN STOOL: 0
COUGH: 0
CONSTIPATION: 1
DIARRHEA: 0
SHORTNESS OF BREATH: 0
SORE THROAT: 0
NAUSEA: 0

## 2020-11-09 NOTE — PATIENT INSTRUCTIONS
Survey: You may be receiving a survey from [employer] regarding your visit today. You may get this in the mail, through your MyChart or in your email. Please complete the survey to enable us to provide the highest quality of care to you and your family. Please also, mention our names. If you cannot score us as very good (5 Stars) on any question, please feel free to call the office to discuss how we could have made your experience exceptional.      Thank You! Dr. Mary Alice Elaine MD    [facility], [address] [place] [address], [address] [place] [address]. Colon cancer screening  Set up for screening colonoscopy. Risk and benefits explained and informed consent obtained. The bowel prep was explained to [place]EE [place] ISAAK and she was instructed to start the prep the day before the procedure (printed directions were given). Avoid corn 1 week prior to the procedure as this can cause suctioning difficulties during the procedure. Avoid eating or drinking at least 8 hours prior to the procedure. [place] ISAAK was encouraged to call the clinic if she has any questions prior to the procedure. - Na Sulfate-K Sulfate-Mg Sulf (SUPREP BOWEL PREP KIT) 17.5-3.13-1.6 GM/177ML SOLN; Use as directed. Dispense: 1 Bottle; Refill: 0  - COLONOSCOPY W/ OR W/O BIOPSY; Future    2. Chronic constipation  Try Linzess 145 mcg daily. - linaclotide (LINZESS) 145 MCG capsule; Take 1 capsule by mouth daily  Dispense: 30 capsule; Refill: 3    Follow up with Dr. Danna Arambula after the procedure.

## 2020-11-09 NOTE — PROGRESS NOTES
Subjective:      Patient ID: Elle Urbina is a 48 y.o. female. [place] a patient of Dr. Colby Crow presents to be evaluated for a colonoscopy. Gus Barney is 48 y.o. and has not had a colonoscopy in the past.    Currently Gus Barney denies rectal bleeding, denies bowel habit changes, and denies abdominal pain. There is not a family history of colon cancer. She does have a chronic history of constipation. Review of Systems   Constitutional: Negative. HENT: Negative for congestion, ear pain, rhinorrhea, sneezing and sore throat. Eyes: Negative for visual disturbance. Respiratory: Negative for cough, chest tightness and shortness of breath. Cardiovascular: Negative for chest pain and palpitations. Gastrointestinal: Positive for constipation. Negative for abdominal pain, blood in stool, diarrhea and nausea. Genitourinary: Negative for difficulty urinating, dysuria, frequency, menstrual problem and urgency. Musculoskeletal: Negative for arthralgias, joint swelling, myalgias and neck pain. Skin: Negative. Neurological: Negative for syncope. Psychiatric/Behavioral: Negative. Objective:   Physical Exam  Constitutional:       Appearance: She is well-developed. HENT:      Head: Atraumatic. Eyes:      Conjunctiva/sclera: Conjunctivae normal.   Neck:      Musculoskeletal: Normal range of motion and neck supple. Cardiovascular:      Rate and Rhythm: Normal rate and regular rhythm. Heart sounds: Normal heart sounds. Pulmonary:      Effort: Pulmonary effort is normal.      Breath sounds: Normal breath sounds. Abdominal:      Palpations: Abdomen is soft. Tenderness: There is no abdominal tenderness. Musculoskeletal: Normal range of motion. Lymphadenopathy:      Cervical: No cervical adenopathy. Skin:     Findings: No rash. Neurological:      Mental Status: She is alert. Psychiatric:         Behavior: Behavior normal.         Thought Content:  Thought content normal. Assessment:       Diagnosis Orders   1. Colon cancer screening  Na Sulfate-K Sulfate-Mg Sulf (SUPREP BOWEL PREP KIT) 17.5-3.13-1.6 GM/177ML SOLN    COLONOSCOPY W/ OR W/O BIOPSY   2. Chronic constipation  linaclotide (LINZESS) 145 MCG capsule           Plan:      1. Colon cancer screening  Set up for screening colonoscopy. Risk and benefits explained and informed consent obtained. The bowel prep was explained to [facility] and she was instructed to start the prep the day before the procedure (printed directions were given). Avoid corn 1 week prior to the procedure as this can cause suctioning difficulties during the procedure. Avoid eating or drinking at least 8 hours prior to the procedure. [facility] was encouraged to call the clinic if she has any questions prior to the procedure. - Na Sulfate-K Sulfate-Mg Sulf (SUPREP BOWEL PREP KIT) 17.5-3.13-1.6 GM/177ML SOLN; Use as directed. Dispense: 1 Bottle; Refill: 0  - COLONOSCOPY W/ OR W/O BIOPSY; Future    2. Chronic constipation  Try Linzess 145 mcg daily. - linaclotide (LINZESS) 145 MCG capsule; Take 1 capsule by mouth daily  Dispense: 30 capsule; Refill: 3    Follow up with Dr. Kervin Torrez after the procedure.           Marlon Bartlett MD

## 2020-11-30 ENCOUNTER — HOSPITAL ENCOUNTER (OUTPATIENT)
Dept: PREADMISSION TESTING | Age: 50
Setting detail: SPECIMEN
Discharge: HOME | End: 2020-11-30
Payer: COMMERCIAL

## 2020-11-30 ENCOUNTER — TELEPHONE (OUTPATIENT)
Dept: FAMILY MEDICINE CLINIC | Age: 50
End: 2020-11-30

## 2020-11-30 LAB
SARS-COV-2, RAPID: DETECTED
SARS-COV-2: ABNORMAL
SARS-COV-2: ABNORMAL
SOURCE: ABNORMAL

## 2020-11-30 PROCEDURE — U0002 COVID-19 LAB TEST NON-CDC: HCPCS

## 2020-11-30 PROCEDURE — C9803 HOPD COVID-19 SPEC COLLECT: HCPCS

## 2020-11-30 NOTE — TELEPHONE ENCOUNTER
Marty Fallon said within the last couple hours she has gotten body aches, nasal/sinus congestion. She is supposed to travel to [employer] [place] [facility] and doesn't want to go if she has COVID. She would like to know if she can get a rapdi COVId test. Please let Alberta know. Health Maintenance   Topic Date Due    Pneumococcal 0-64 years Vaccine (1 of 1 - PPSV23) 07/21/1976    HIV screen  07/21/1985    Cervical cancer screen  07/21/1991    Shingles Vaccine (1 of 2) 07/21/2020    Colon cancer screen colonoscopy  07/21/2020    Flu vaccine (1) 09/01/2020    Breast cancer screen  09/17/2022    Lipid screen  08/17/2025    DTaP/Tdap/Td vaccine (2 - Td) 10/07/2030    Hepatitis A vaccine  Aged Out    Hepatitis B vaccine  Aged Out    Hib vaccine  Aged Out    Meningococcal (ACWY) vaccine  Aged Out             (applicable per patient's age: Cancer Screenings, Depression Screening, Fall Risk Screening, Immunizations)    LDL Cholesterol (mg/dL)   Date Value   08/17/2020 151 (H)     AST (U/L)   Date Value   08/17/2020 18     ALT (U/L)   Date Value   08/17/2020 11     BUN (mg/dL)   Date Value   08/17/2020 9      (goal A1C is < 7)   (goal LDL is <100) need 30-50% reduction from baseline     BP Readings from Last 3 Encounters:   11/09/20 122/80   10/07/20 119/77   10/05/20 122/70    (goal /80)      All Future Testing planned in [facility]:  Lab Frequency Next Occurrence   US BREAST COMPLETE LEFT Once 03/17/2021   TANI DIAGNOSTIC W CAD LEFT Once 03/17/2021   Initiate PAT Protocol Once 11/20/2020   COLONOSCOPY W/ OR W/O BIOPSY Once 12/09/2020       Next Visit Date:  No future appointments.          Patient Active Problem List:     OCD (obsessive compulsive disorder)     Anxiety     Fatigue     Primary fibromyalgia syndrome

## 2020-12-01 ENCOUNTER — TELEPHONE (OUTPATIENT)
Dept: ADMINISTRATIVE | Age: 50
End: 2020-12-01

## 2021-01-07 ENCOUNTER — OFFICE VISIT (OUTPATIENT)
Dept: FAMILY MEDICINE CLINIC | Age: 51
End: 2021-01-07
Payer: COMMERCIAL

## 2021-01-07 VITALS
SYSTOLIC BLOOD PRESSURE: 120 MMHG | WEIGHT: 144 LBS | HEART RATE: 83 BPM | HEIGHT: 66 IN | OXYGEN SATURATION: 98 % | BODY MASS INDEX: 23.14 KG/M2 | DIASTOLIC BLOOD PRESSURE: 78 MMHG | TEMPERATURE: 98.2 F

## 2021-01-07 DIAGNOSIS — L40.3 PLANTAR PUSTULAR PSORIASIS: Primary | ICD-10-CM

## 2021-01-07 DIAGNOSIS — G89.29 CHRONIC LEFT SHOULDER PAIN: ICD-10-CM

## 2021-01-07 DIAGNOSIS — M25.512 CHRONIC LEFT SHOULDER PAIN: ICD-10-CM

## 2021-01-07 PROCEDURE — 96372 THER/PROPH/DIAG INJ SC/IM: CPT | Performed by: FAMILY MEDICINE

## 2021-01-07 PROCEDURE — 99213 OFFICE O/P EST LOW 20 MIN: CPT | Performed by: FAMILY MEDICINE

## 2021-01-07 RX ORDER — KETOROLAC TROMETHAMINE 30 MG/ML
30 INJECTION, SOLUTION INTRAMUSCULAR; INTRAVENOUS ONCE
Status: COMPLETED | OUTPATIENT
Start: 2021-01-07 | End: 2021-01-07

## 2021-01-07 RX ORDER — PIMECROLIMUS 10 MG/G
CREAM TOPICAL
Qty: 60 G | Refills: 1 | Status: SHIPPED | OUTPATIENT
Start: 2021-01-07 | End: 2022-08-17

## 2021-01-07 RX ADMIN — KETOROLAC TROMETHAMINE 30 MG: 30 INJECTION, SOLUTION INTRAMUSCULAR; INTRAVENOUS at 09:34

## 2021-01-07 ASSESSMENT — PATIENT HEALTH QUESTIONNAIRE - PHQ9
SUM OF ALL RESPONSES TO PHQ9 QUESTIONS 1 & 2: 0
SUM OF ALL RESPONSES TO PHQ QUESTIONS 1-9: 0
2. FEELING DOWN, DEPRESSED OR HOPELESS: 0
SUM OF ALL RESPONSES TO PHQ QUESTIONS 1-9: 0

## 2021-01-07 NOTE — PROGRESS NOTES
Name: Destin Benito  : 1970         Chief Complaint:     Chief Complaint   Patient presents with    Follow-up     Patient presents today for ER follow up- chest pain and left shoulder pain ongoing for months, blisters on the right foot hurts to the touch been ongoing for months getting worse        History of Present Illness:      Destin Benito is a 48 y.o.  female who presents with Follow-up (Patient presents today for ER follow up- chest pain and left shoulder pain ongoing for months, blisters on the right foot hurts to the touch been ongoing for months getting worse )      HPI     Pt c/o ongoing pain in L upper chest, recent ER visit. Few days ago was working out of town and again had worsening of pain in chest and L shoulder. Started radiating up L side of neck, which hadn't happened before, and she got scared that she would have a stroke. Got to ER and bp was 155/110, took about 3h to come down. Had CXR, CTA. Thinks pain is MSK. Pain better if she keeps L arm still, matthew with the elbow flexed and hand against chest. Shoulder pops with motion. S/p cath which showed no occlusive disease. Also had breast surgery with removal of benign lumps from L breast. She's very glad she had the breast surgery but it did not resolve all the pain in her chest.    Also c/o ongoing rash on R foot, painful and tender to touch, gets blisters, has been worsening over period of months. Has tried topical kenalog without adequate relief. Past Medical History:     Past Medical History:   Diagnosis Date    Anxiety         Past Surgical History:     Past Surgical History:   Procedure Laterality Date    CARDIAC CATHETERIZATION Left 2020    Dr. Deb Huggins @ [facility]--Medical therapy    LAPAROSCOPY          Medications:       Prior to Admission medications    Medication Sig Start Date End Date Taking? Authorizing Provider   pimecrolimus (ELIDEL) 1 % cream Apply topically 2 times daily.  21  Yes Raeann Arce,  Na Sulfate-K Sulfate-Mg Sulf (SUPREP BOWEL PREP KIT) 17.5-3.13-1.6 GM/177ML SOLN Use as directed. 11/9/20  Yes Tobias Hoyt MD   linaclotide (LINZESS) 145 MCG capsule Take 1 capsule by mouth daily 11/9/20  Yes Tobias Hoyt MD   PARoxetine (PAXIL) 10 MG tablet Take 1 tablet by mouth daily 10/30/20  Yes Chacorta Loyola DO   QUEtiapine (SEROQUEL) 50 MG tablet Take 1.5 tablets by mouth nightly 10/8/20  Yes Chacorta Loyola DO   ibuprofen (ADVIL;MOTRIN) 600 MG tablet Take 1 tablet by mouth 4 times daily as needed for Pain 10/7/20  Yes Chris Fu MD   Cholecalciferol (VITAMIN D) 50 MCG (2000 UT) CAPS capsule Take 2,000 Units by mouth daily   Yes Coleman Sigala MD   clonazePAM (KLONOPIN) 0.5 MG tablet TAKE 1 TO 2 TABLETS BY MOUTH EVERY DAY AS NEEDED FOR ANXIETY 10/8/20 1/6/21  Chacorta Loyola DO   nicotine (NICODERM CQ) 14 MG/24HR Place 1 patch onto the skin daily for 14 days 9/14/20 10/7/20  Brady Beckman MD   nicotine (NICODERM CQ) 7 MG/24HR Place 1 patch onto the skin daily for 14 days 6/9/20 7/31/20  Brady Beckman MD        Allergies:       Codeine, Effexor [venlafaxine], and Xanax [alprazolam]    Social History:     Tobacco:    reports that she has been smoking. She has been smoking about 0.50 packs per day. She has never used smokeless tobacco.  Alcohol:      reports current alcohol use. Drug Use:  reports no history of drug use. Family History:     No family history on file. Review of Systems:     Positive and Negative as described in HPI    Review of Systems   Constitutional: Negative. Respiratory: Negative. Gastrointestinal: Negative. Physical Exam:     Vitals:  /78 (Site: Left Upper Arm, Position: Sitting, Cuff Size: Medium Adult)   Pulse 83   Temp 98.2 °F (36.8 °C) (Oral)   Ht 5' 6\" (1.676 m)   Wt 144 lb (65.3 kg)   LMP 04/05/2014   SpO2 98%   BMI 23.24 kg/m²   Physical Exam  Vitals signs and nursing note reviewed.    Constitutional:       General: She is not in acute distress. Appearance: Normal appearance. She is well-developed. She is not ill-appearing. HENT:      Head: Normocephalic and atraumatic. Eyes:      Conjunctiva/sclera: Conjunctivae normal.   Cardiovascular:      Rate and Rhythm: Normal rate and regular rhythm. Heart sounds: Normal heart sounds. Comments: No peripheral edema. Pulmonary:      Effort: Pulmonary effort is normal.      Breath sounds: Normal breath sounds. Musculoskeletal:      Comments: L shoulder diffusely TTP. Range of abduction limited by approx 15 degrees. 3/5 strength with empty can and pain elicited with maneuver. Clicking with passive abduction and adduction around 90 degrees. Skin:     General: Skin is warm and dry. Comments: R foot posterior half: diffuse erythema, few small pustules, very dry, peeling   Neurological:      Mental Status: She is alert and oriented to person, place, and time. Psychiatric:         Judgment: Judgment normal.         Data:     Lab Results   Component Value Date     08/17/2020    K 4.3 08/17/2020     08/17/2020    CO2 25 08/17/2020    BUN 9 08/17/2020    CREATININE 1.01 08/17/2020    GLUCOSE 76 08/17/2020    PROT 8.6 08/17/2020    LABALBU 4.4 08/17/2020    BILITOT 0.74 08/17/2020    ALKPHOS 102 08/17/2020    AST 18 08/17/2020    ALT 11 08/17/2020     Lab Results   Component Value Date    WBC 6.3 08/17/2020    RBC 4.94 08/17/2020    HGB 14.6 08/17/2020    HCT 43.1 08/17/2020    MCV 87.3 08/17/2020    MCH 29.6 08/17/2020    MCHC 33.9 08/17/2020    RDW 12.7 08/17/2020     08/17/2020    MPV NOT REPORTED 08/17/2020     Lab Results   Component Value Date    TSH 0.41 06/08/2020     Lab Results   Component Value Date    CHOL 234 08/17/2020    HDL 60 08/17/2020         Assessment & Plan:        Diagnosis Orders   1. Plantar pustular psoriasis  pimecrolimus (ELIDEL) 1 % cream   2.  Chronic left shoulder pain  XR SHOULDER LEFT (MIN 2 VIEWS)   trial of elidel to foot, f/u if stretch   1. Reach your arm straight up. 2. Keeping your elbow in place, bend your arm and reach your hand down behind your back. 3. With your other hand, apply gentle pressure to the bent elbow. Chasidy Jaramillo feel a stretch at the back of your upper arm and shoulder. Hold about 6 seconds. 4. Repeat 2 to 4 times with each arm. Shoulder stretch   1. Relax your shoulders. 2. Raise one arm to shoulder height, and reach it across your chest.  3. Pull the arm slightly toward you with your other arm. This will help you get a gentle stretch. Hold for about 6 seconds. 4. Repeat 2 to 4 times. Shoulder blade squeeze   1. Sit or stand up tall with your arms at your sides. 2. Keep your shoulders relaxed and down, not shrugged. 3. Squeeze your shoulder blades together. Hold for 6 seconds, then relax. 4. Repeat 8 to 12 times. Straight-arm shoulder blade squeeze   1. Sit or stand tall. Relax your shoulders. 2. With palms down, hold your elastic tubing or band straight out in front of you. 3. Start with slight tension in the tubing or band, with your hands about shoulder-width apart. 4. Slowly pull straight out to the sides, squeezing your shoulder blades together. Keep your arms straight and at shoulder height. Slowly release. 5. Repeat 8 to 12 times. Rowing   1. [place] your elastic tubing or band at about waist height. Take one end in each hand. 2. Sit or stand with your feet hip-width apart. 3. Hold your arms straight in front of you. Adjust your distance to create slight tension in the tubing or band. 4. Slightly tuck your chin. Relax your shoulders. 5. Without shrugging your shoulders, pull straight back. Your elbows will pass alongside your waist.    Pull-downs   1. [place] your elastic tubing or band in the top of a closed door. Take one end in each hand. 2. Either sit or stand, depending on what is more comfortable. If you feel unsteady, sit on a chair.   3. Start with your arms up and comfortably apart, elbows straight. There should be a slight tension in the tubing or band. 4. Slightly tuck your chin, and look straight ahead. 5. Keeping your back straight, slowly pull down and back, bending your elbows. 6. Stop where your hands are level with your chin, in a \"goalpost\" position. 7. Repeat 8 to 12 times. Chest T stretch   1. Lie on your back. Raise your knees so they are bent. Plant your feet on the floor, hip-width apart. 2. Tuck your chin, and relax your shoulders. 3. Reach your arms straight out to the sides. If you don't feel a mild stretch in your shoulders and across your chest, use a foam roll or a tightly rolled blanket under your spine, from your tailbone to your head. 4. Relax in this position for at least 15 to 30 seconds while you breathe normally. Repeat 2 to 4 times. 5. As you get used to this stretch, keep adding a little more time until you are able relax in this position for 2 or 3 minutes. When you can relax for at least 2 minutes, you only need to do the exercise 1 time per session. Chest goalpost stretch   1. Lie on your back. Raise your knees so they are bent. Plant your feet on the floor, hip-width apart. 2. Tuck your chin, and relax your shoulders. 3. Reach your arms straight out to the sides. 4. Bend your arms at the elbows, with your hands pointed toward the top of your head. Your arms should make an L on either side of your head. Your palms should be facing up. 5. If you don't feel a mild stretch in your shoulders and across your chest, use a foam roll or tightly rolled blanket under your spine, from your tailbone to your head. 6. Relax in this position for at least 15 to 30 seconds while you breathe normally. Repeat 2 to 4 times. 7. Each day you do this exercise, add a little more time until you can relax in this position for 2 or 3 minutes. When you can relax for at least 2 minutes, you only need to do the exercise 1 time per session.     Follow-up care is a key part of your treatment and safety. Be sure to make and go to all appointments, and call your doctor if you are having problems. It's also a good idea to know your test results and keep a list of the medicines you take. Where can you learn more? Go to https://chpefarooq.ProtAb. org and sign in to your [employer] account. Enter [phone] in the Specialized Tech box to learn more about \"Shoulder Blade: Exercises. \"     If you do not have an account, please click on the \"Sign Up Now\" link. Current as of: March 2, 2020               Content Version: 12.6  © 8694-1636 [employer]. Care instructions adapted under license by [facility] ([facility]). If you have questions about a medical condition or this instruction, always ask your healthcare professional. [place] [address] any warranty or liability for your use of this information. [place] received counseling on the following healthy behaviors: medication adherence  Reviewed prior labs and health maintenance. Continue current medications, diet and exercise. Discussed use, benefit, and side effects of prescribed medications. Barriers to medication compliance addressed. Patient given educational materials - see patient instructions. All patient questions answered. Patient voiced understanding.      Electronically signed by Jayjay Olivera DO on 1/10/2021 at 10:47 PM   [address]  Dept: [phone]

## 2021-01-07 NOTE — PATIENT INSTRUCTIONS
SURVEY:    You may be receiving a survey from [employer] regarding your visit today. Please complete the survey to enable us to provide the highest quality of care to you and your family. If you cannot score us a very good on any question, please call the office to discuss how we could of made your experience a very good one. Thank you. Patient Education        Shoulder Blade: Exercises  Introduction  Here are some examples of exercises for you to try. The exercises may be suggested for a condition or for rehabilitation. Start each exercise slowly. Ease off the exercises if you start to have pain. You will be told when to start these exercises and which ones will work best for you. How to do the exercises  Shoulder roll   1. Stand tall with your chin slightly tucked. Imagine that a string at the top of your head is pulling you straight up. 2. Keep your arms relaxed. All motion will be in your shoulders. 3. Shrug your shoulders up toward your ears, then up and back. [place] your shoulders down and back, like you're sliding your hands down into your back pants pockets. 4. Repeat the circles at least 2 to 4 times. 5. This exercise is also helpful anytime you want to relax. Lower neck and upper back stretch   1. With your arms about shoulder height, clasp your hands in front of you. 2. Drop your chin toward your chest.  3. Reach straight forward so you are rounding your upper back. Think about pulling your shoulder blades apart. Eugenia Reed feel a stretch across your upper back and shoulders. Hold for at least 6 seconds. 4. Repeat 2 to 4 times. Triceps stretch   1. Reach your arm straight up. 2. Keeping your elbow in place, bend your arm and reach your hand down behind your back. 3. With your other hand, apply gentle pressure to the bent elbow. Eugenia Reed feel a stretch at the back of your upper arm and shoulder. Hold about 6 seconds. 4. Repeat 2 to 4 times with each arm.     Shoulder stretch 1. Relax your shoulders. 2. Raise one arm to shoulder height, and reach it across your chest.  3. Pull the arm slightly toward you with your other arm. This will help you get a gentle stretch. Hold for about 6 seconds. 4. Repeat 2 to 4 times. Shoulder blade squeeze   1. Sit or stand up tall with your arms at your sides. 2. Keep your shoulders relaxed and down, not shrugged. 3. Squeeze your shoulder blades together. Hold for 6 seconds, then relax. 4. Repeat 8 to 12 times. Straight-arm shoulder blade squeeze   1. Sit or stand tall. Relax your shoulders. 2. With palms down, hold your elastic tubing or band straight out in front of you. 3. Start with slight tension in the tubing or band, with your hands about shoulder-width apart. 4. Slowly pull straight out to the sides, squeezing your shoulder blades together. Keep your arms straight and at shoulder height. Slowly release. 5. Repeat 8 to 12 times. Rowing   1. [place] your elastic tubing or band at about waist height. Take one end in each hand. 2. Sit or stand with your feet hip-width apart. 3. Hold your arms straight in front of you. Adjust your distance to create slight tension in the tubing or band. 4. Slightly tuck your chin. Relax your shoulders. 5. Without shrugging your shoulders, pull straight back. Your elbows will pass alongside your waist.    Pull-downs   1. [place] your elastic tubing or band in the top of a closed door. Take one end in each hand. 2. Either sit or stand, depending on what is more comfortable. If you feel unsteady, sit on a chair. 3. Start with your arms up and comfortably apart, elbows straight. There should be a slight tension in the tubing or band. 4. Slightly tuck your chin, and look straight ahead. 5. Keeping your back straight, slowly pull down and back, bending your elbows. 6. Stop where your hands are level with your chin, in a \"goalpost\" position. 7. Repeat 8 to 12 times. Chest T stretch   1.  Lie on your back. Raise your knees so they are bent. Plant your feet on the floor, hip-width apart. 2. Tuck your chin, and relax your shoulders. 3. Reach your arms straight out to the sides. If you don't feel a mild stretch in your shoulders and across your chest, use a foam roll or a tightly rolled blanket under your spine, from your tailbone to your head. 4. Relax in this position for at least 15 to 30 seconds while you breathe normally. Repeat 2 to 4 times. 5. As you get used to this stretch, keep adding a little more time until you are able relax in this position for 2 or 3 minutes. When you can relax for at least 2 minutes, you only need to do the exercise 1 time per session. Chest goalpost stretch   1. Lie on your back. Raise your knees so they are bent. Plant your feet on the floor, hip-width apart. 2. Tuck your chin, and relax your shoulders. 3. Reach your arms straight out to the sides. 4. Bend your arms at the elbows, with your hands pointed toward the top of your head. Your arms should make an L on either side of your head. Your palms should be facing up. 5. If you don't feel a mild stretch in your shoulders and across your chest, use a foam roll or tightly rolled blanket under your spine, from your tailbone to your head. 6. Relax in this position for at least 15 to 30 seconds while you breathe normally. Repeat 2 to 4 times. 7. Each day you do this exercise, add a little more time until you can relax in this position for 2 or 3 minutes. When you can relax for at least 2 minutes, you only need to do the exercise 1 time per session. Follow-up care is a key part of your treatment and safety. Be sure to make and go to all appointments, and call your doctor if you are having problems. It's also a good idea to know your test results and keep a list of the medicines you take. Where can you learn more? Go to https://ronel.Katango. org and sign in to your [employer] account.  Enter [phone] in the Search Health Information box to learn more about \"Shoulder Blade: Exercises. \"     If you do not have an account, please click on the \"Sign Up Now\" link. Current as of: March 2, 2020               Content Version: 12.6  © 6115-2663 [employer]. Care instructions adapted under license by [facility] [place] ([facility]). If you have questions about a medical condition or this instruction, always ask your healthcare professional. [address] any warranty or liability for your use of this information.

## 2021-01-08 ENCOUNTER — HOSPITAL ENCOUNTER (OUTPATIENT)
Age: 51
Discharge: HOME | End: 2021-01-10
Payer: COMMERCIAL

## 2021-01-08 ENCOUNTER — HOSPITAL ENCOUNTER (OUTPATIENT)
Dept: GENERAL RADIOLOGY | Age: 51
Discharge: HOME | End: 2021-01-10
Payer: COMMERCIAL

## 2021-01-08 DIAGNOSIS — M25.512 CHRONIC LEFT SHOULDER PAIN: ICD-10-CM

## 2021-01-08 DIAGNOSIS — G89.29 CHRONIC LEFT SHOULDER PAIN: ICD-10-CM

## 2021-01-08 PROCEDURE — 73030 X-RAY EXAM OF SHOULDER: CPT

## 2021-01-09 ENCOUNTER — TELEPHONE (OUTPATIENT)
Dept: PRIMARY CARE CLINIC | Age: 51
End: 2021-01-09

## 2021-01-09 DIAGNOSIS — G89.29 CHRONIC LEFT SHOULDER PAIN: Primary | ICD-10-CM

## 2021-01-09 DIAGNOSIS — M25.512 CHRONIC LEFT SHOULDER PAIN: Primary | ICD-10-CM

## 2021-01-09 NOTE — TELEPHONE ENCOUNTER
Patient notified of shoulder xray results  Patient said she has been doing exercises for a while now  She is asking if you will order a MRI on her shoulder?

## 2021-01-09 NOTE — TELEPHONE ENCOUNTER
----- Message from Breezy Murillo DO sent at 1/9/2021 10:34 AM EST -----  xr ok, no significant arthritis in the shoulder and no other abnormality. Exercises at home as we discussed and call if not improving.

## 2021-01-10 ASSESSMENT — ENCOUNTER SYMPTOMS
GASTROINTESTINAL NEGATIVE: 1
RESPIRATORY NEGATIVE: 1

## 2021-01-14 ENCOUNTER — TELEPHONE (OUTPATIENT)
Dept: FAMILY MEDICINE CLINIC | Age: 51
End: 2021-01-14

## 2021-01-14 DIAGNOSIS — G89.29 CHRONIC LEFT SHOULDER PAIN: Primary | ICD-10-CM

## 2021-01-14 DIAGNOSIS — M25.512 CHRONIC LEFT SHOULDER PAIN: Primary | ICD-10-CM

## 2021-01-14 RX ORDER — MELOXICAM 15 MG/1
15 TABLET ORAL DAILY
COMMUNITY
End: 2022-08-17

## 2021-01-14 NOTE — TELEPHONE ENCOUNTER
It probably will not meet criteria right now then. Please cancel the MRI and let patient know that she needs to start physical therapy and also meloxicam 15 mg daily.

## 2021-01-14 NOTE — TELEPHONE ENCOUNTER
Can you please find out if the insurance company has reviewed my office note from that day? On the peer to peer I just did on another patient, all the information they needed was in my office note but they did not get it at the same time as the request.  Thanks!

## 2021-01-14 NOTE — TELEPHONE ENCOUNTER
Shawanda Sena is scheduled for an MRI of her left shoulder tomorrow. Leila called and it does not meet criteria so she does need a peer to peer. If we are unable to do the peer to peer we need to let Lynne Avila know so she can cancel the appointment. The number is [phone].     Health Maintenance   Topic Date Due    Hepatitis C screen  1970    Pneumococcal 0-64 years Vaccine (1 of 1 - PPSV23) 07/21/1976    HIV screen  07/21/1985    Cervical cancer screen  07/21/1991    Shingles Vaccine (1 of 2) 07/21/2020    Colon cancer screen colonoscopy  07/21/2020    Flu vaccine (1) 09/01/2020    Breast cancer screen  09/17/2022    Lipid screen  08/17/2025    DTaP/Tdap/Td vaccine (2 - Td) 10/07/2030    Hepatitis A vaccine  Aged Out    Hepatitis B vaccine  Aged Out    Hib vaccine  Aged Out    Meningococcal (ACWY) vaccine  Aged Out             (applicable per patient's age: Cancer Screenings, Depression Screening, Fall Risk Screening, Immunizations)    LDL Cholesterol (mg/dL)   Date Value   08/17/2020 151 (H)     AST (U/L)   Date Value   08/17/2020 18     ALT (U/L)   Date Value   08/17/2020 11     BUN (mg/dL)   Date Value   08/17/2020 9      (goal A1C is < 7)   (goal LDL is <100) need 30-50% reduction from baseline     BP Readings from Last 3 Encounters:   01/07/21 120/78   11/09/20 122/80   10/07/20 119/77    (goal /80)      All Future Testing planned in [facility]:  Lab Frequency Next Occurrence   US BREAST COMPLETE LEFT Once 03/17/2021   TANI DIAGNOSTIC W CAD LEFT Once 03/17/2021   COLONOSCOPY W/ OR W/O BIOPSY Once 12/09/2020   COVID-19 Ambulatory Once 12/30/2020   MRI SHOULDER LEFT WO CONTRAST Once 01/09/2021       Next Visit Date:  Future Appointments   Date Time Provider Dima Elaine   1/15/2021  8:30 AM [facility] MRI [facility] Rad            Patient Active Problem List:     OCD (obsessive compulsive disorder)     Anxiety     Fatigue     Primary fibromyalgia syndrome

## 2021-01-14 NOTE — TELEPHONE ENCOUNTER
Spoke to Mariposa. She did get the office notes from 1/7/21. She said they typically want to see at least 6 weeks of other options.

## 2021-01-14 NOTE — TELEPHONE ENCOUNTER
Patient notified. She will try to work out her schedule to do PT, please order. She has meloxicam at home that she will take. Toradol helped greatly. Did not need any other meds for 3 days.

## 2021-02-02 ENCOUNTER — HOSPITAL ENCOUNTER (EMERGENCY)
Age: 51
Discharge: HOME | End: 2021-02-02
Payer: COMMERCIAL

## 2021-02-02 ENCOUNTER — APPOINTMENT (OUTPATIENT)
Dept: GENERAL RADIOLOGY | Age: 51
End: 2021-02-02
Payer: COMMERCIAL

## 2021-02-02 VITALS
OXYGEN SATURATION: 98 % | RESPIRATION RATE: 18 BRPM | HEIGHT: 66 IN | DIASTOLIC BLOOD PRESSURE: 81 MMHG | HEART RATE: 79 BPM | SYSTOLIC BLOOD PRESSURE: 121 MMHG | WEIGHT: 140 LBS | BODY MASS INDEX: 22.5 KG/M2

## 2021-02-02 DIAGNOSIS — S52.571A OTHER CLOSED INTRA-ARTICULAR FRACTURE OF DISTAL END OF RIGHT RADIUS, INITIAL ENCOUNTER: Primary | ICD-10-CM

## 2021-02-02 LAB
ABSOLUTE EOS #: 0.24 K/UL (ref 0–0.44)
ABSOLUTE IMMATURE GRANULOCYTE: 0.03 K/UL (ref 0–0.3)
ABSOLUTE LYMPH #: 4.27 K/UL (ref 1.1–3.7)
ABSOLUTE MONO #: 0.43 K/UL (ref 0.1–1.2)
ANION GAP SERPL CALCULATED.3IONS-SCNC: 12 MMOL/L (ref 9–17)
BASOPHILS # BLD: 0 % (ref 0–2)
BASOPHILS ABSOLUTE: 0.04 K/UL (ref 0–0.2)
BUN BLDV-MCNC: 9 MG/DL (ref 6–20)
BUN/CREAT BLD: 13 (ref 9–20)
CALCIUM SERPL-MCNC: 9.1 MG/DL (ref 8.6–10.4)
CHLORIDE BLD-SCNC: 99 MMOL/L (ref 98–107)
CO2: 23 MMOL/L (ref 20–31)
CREAT SERPL-MCNC: 0.67 MG/DL (ref 0.5–0.9)
DIFFERENTIAL TYPE: ABNORMAL
EOSINOPHILS RELATIVE PERCENT: 2 % (ref 1–4)
GFR AFRICAN AMERICAN: >60 ML/MIN
GFR NON-AFRICAN AMERICAN: >60 ML/MIN
GFR SERPL CREATININE-BSD FRML MDRD: ABNORMAL ML/MIN/{1.73_M2}
GFR SERPL CREATININE-BSD FRML MDRD: ABNORMAL ML/MIN/{1.73_M2}
GLUCOSE BLD-MCNC: 96 MG/DL (ref 70–99)
HCT VFR BLD CALC: 41.2 % (ref 36.3–47.1)
HEMOGLOBIN: 13.4 G/DL (ref 11.9–15.1)
IMMATURE GRANULOCYTES: 0 %
LYMPHOCYTES # BLD: 39 % (ref 24–43)
MAGNESIUM: 1.8 MG/DL (ref 1.6–2.6)
MCH RBC QN AUTO: 28.9 PG (ref 25.2–33.5)
MCHC RBC AUTO-ENTMCNC: 32.5 G/DL (ref 28.4–34.8)
MCV RBC AUTO: 88.8 FL (ref 82.6–102.9)
MONOCYTES # BLD: 4 % (ref 3–12)
NRBC AUTOMATED: 0 PER 100 WBC
PDW BLD-RTO: 12.1 % (ref 11.8–14.4)
PLATELET # BLD: 393 K/UL (ref 138–453)
PLATELET ESTIMATE: ABNORMAL
PMV BLD AUTO: 9.3 FL (ref 8.1–13.5)
POTASSIUM SERPL-SCNC: 3.4 MMOL/L (ref 3.7–5.3)
RBC # BLD: 4.64 M/UL (ref 3.95–5.11)
RBC # BLD: ABNORMAL 10*6/UL
SEG NEUTROPHILS: 55 % (ref 36–65)
SEGMENTED NEUTROPHILS ABSOLUTE COUNT: 5.89 K/UL (ref 1.5–8.1)
SODIUM BLD-SCNC: 134 MMOL/L (ref 135–144)
WBC # BLD: 10.9 K/UL (ref 3.5–11.3)
WBC # BLD: ABNORMAL 10*3/UL

## 2021-02-02 PROCEDURE — 2500000003 HC RX 250 WO HCPCS: Performed by: NURSE PRACTITIONER

## 2021-02-02 PROCEDURE — 85025 COMPLETE CBC W/AUTO DIFF WBC: CPT

## 2021-02-02 PROCEDURE — 83735 ASSAY OF MAGNESIUM: CPT

## 2021-02-02 PROCEDURE — 6370000000 HC RX 637 (ALT 250 FOR IP): Performed by: NURSE PRACTITIONER

## 2021-02-02 PROCEDURE — 73110 X-RAY EXAM OF WRIST: CPT

## 2021-02-02 PROCEDURE — 80048 BASIC METABOLIC PNL TOTAL CA: CPT

## 2021-02-02 PROCEDURE — 6360000002 HC RX W HCPCS: Performed by: NURSE PRACTITIONER

## 2021-02-02 PROCEDURE — 29125 APPL SHORT ARM SPLINT STATIC: CPT

## 2021-02-02 PROCEDURE — 36415 COLL VENOUS BLD VENIPUNCTURE: CPT

## 2021-02-02 PROCEDURE — 99284 EMERGENCY DEPT VISIT MOD MDM: CPT

## 2021-02-02 PROCEDURE — 96374 THER/PROPH/DIAG INJ IV PUSH: CPT

## 2021-02-02 PROCEDURE — 73090 X-RAY EXAM OF FOREARM: CPT

## 2021-02-02 RX ORDER — BUPIVACAINE HYDROCHLORIDE 5 MG/ML
30 INJECTION, SOLUTION EPIDURAL; INTRACAUDAL ONCE
Status: COMPLETED | OUTPATIENT
Start: 2021-02-02 | End: 2021-02-02

## 2021-02-02 RX ORDER — LIDOCAINE HYDROCHLORIDE 10 MG/ML
10 INJECTION, SOLUTION EPIDURAL; INFILTRATION; INTRACAUDAL; PERINEURAL ONCE
Status: COMPLETED | OUTPATIENT
Start: 2021-02-02 | End: 2021-02-02

## 2021-02-02 RX ORDER — MORPHINE SULFATE 2 MG/ML
2 INJECTION, SOLUTION INTRAMUSCULAR; INTRAVENOUS ONCE
Status: COMPLETED | OUTPATIENT
Start: 2021-02-02 | End: 2021-02-02

## 2021-02-02 RX ADMIN — MORPHINE SULFATE 2 MG: 2 INJECTION, SOLUTION INTRAMUSCULAR; INTRAVENOUS at 19:04

## 2021-02-02 RX ADMIN — BUPIVACAINE HYDROCHLORIDE 150 MG: 5 INJECTION, SOLUTION EPIDURAL; INTRACAUDAL at 19:20

## 2021-02-02 RX ADMIN — LIDOCAINE HYDROCHLORIDE 10 ML: 10 INJECTION, SOLUTION EPIDURAL; INFILTRATION; INTRACAUDAL; PERINEURAL at 19:20

## 2021-02-02 ASSESSMENT — PAIN DESCRIPTION - PAIN TYPE: TYPE: ACUTE PAIN

## 2021-02-02 ASSESSMENT — PAIN SCALES - GENERAL
PAINLEVEL_OUTOF10: 10
PAINLEVEL_OUTOF10: 8
PAINLEVEL_OUTOF10: 10
PAINLEVEL_OUTOF10: 10
PAINLEVEL_OUTOF10: 3

## 2021-02-02 NOTE — ED NOTES
Bed: 11  Expected date:   Expected time:   Means of arrival:   Comments:  Heaven Tong RN  02/02/21 [id number]

## 2021-02-02 NOTE — ED PROVIDER NOTES
[facility] ED  EMERGENCY DEPARTMENT ENCOUNTER      Pt Name: Lizet Rabago  MRN: [id number]  [place] 1970  Date of evaluation: 2/2/2021  Provider: Tresa Nuno       Chief Complaint   Patient presents with    Fall     Onset prior to arrival after slipping. Pt reports right wrist and hand pain with deformity         HISTORY OF PRESENT ILLNESS   (Location/Symptom, Timing/Onset, Context/Setting, Quality, Duration, Modifying Factors, Severity)  Note limiting factors. Lizet Rabago is a 48 y.o. female who presents to the emergency department for a complaint of a fall just prior to arrival after slipping on some ice and injuring her right wrist and hand. She has obvious deformity noted to her right wrist.  Patient has good radial and ulnar pulses. She rates her pain a 10 out of 10 on the pain scale. She denies other injury. Nursing Notes were reviewed. REVIEW OF SYSTEMS    (2-9 systems for level 4, 10 or more for level 5)   Constitutional: Negative for fever, chills  Skin: Negative for rash, itching  Musculoskeletal: see HPI  Neurological: Negative for tingling, headaches. Except as noted above the remainder of the review of systems was reviewed and negative. PAST MEDICAL HISTORY     Past Medical History:   Diagnosis Date    Anxiety          SURGICAL HISTORY       Past Surgical History:   Procedure Laterality Date    CARDIAC CATHETERIZATION Left 08/21/2020    Dr. Lopez Arreguin @ [facility]--Medical therapy    LAPAROSCOPY           CURRENT MEDICATIONS       Current Discharge Medication List      CONTINUE these medications which have NOT CHANGED    Details   meloxicam (MOBIC) 15 MG tablet Take 15 mg by mouth daily      pimecrolimus (ELIDEL) 1 % cream Apply topically 2 times daily.   Qty: 60 g, Refills: 1    Associated Diagnoses: Plantar pustular psoriasis      Na Sulfate-K Sulfate-Mg Sulf (SUPREP BOWEL PREP KIT) 17.5-3.13-1.6 GM/177ML SOLN Use as directed. Qty: 1 Bottle, Refills: 0    Associated Diagnoses: Colon cancer screening      linaclotide (LINZESS) 145 MCG capsule Take 1 capsule by mouth daily  Qty: 30 capsule, Refills: 3    Associated Diagnoses: Chronic constipation      PARoxetine (PAXIL) 10 MG tablet Take 1 tablet by mouth daily  Qty: 90 tablet, Refills: 1      QUEtiapine (SEROQUEL) 50 MG tablet Take 1.5 tablets by mouth nightly  Qty: 135 tablet, Refills: 1    Comments: We are requesting this refill to have on file for next month s order      clonazePAM (KLONOPIN) 0.5 MG tablet TAKE 1 TO 2 TABLETS BY MOUTH EVERY DAY AS NEEDED FOR ANXIETY  Qty: 60 tablet, Refills: 2    Comments: We are requesting this refill to have on file for next month s order  Associated Diagnoses: Anxiety      ibuprofen (ADVIL;MOTRIN) 600 MG tablet Take 1 tablet by mouth 4 times daily as needed for Pain  Qty: 40 tablet, Refills: 0      nicotine (NICODERM CQ) 14 MG/24HR Place 1 patch onto the skin daily for 14 days  Qty: 14 patch, Refills: 5    Comments: We are requesting this refill to have on file for next month s order. Cholecalciferol (VITAMIN D) 50 MCG (2000 UT) CAPS capsule Take 2,000 Units by mouth daily      nicotine (NICODERM CQ) 7 MG/24HR Place 1 patch onto the skin daily for 14 days  Qty: 14 patch, Refills: 0             ALLERGIES     Codeine, Effexor [venlafaxine], and Xanax [alprazolam]    FAMILY HISTORY     History reviewed. No pertinent family history.        SOCIAL HISTORY       Social History     Socioeconomic History    Marital status:      Spouse name: None    Number of children: None    Years of education: None    Highest education level: None   Occupational History    None   Social Needs    Financial resource strain: Not hard at all   [employer] insecurity     Worry: Never true     Inability: Never true   [employer] needs     Medical: None     Non-medical: None   Tobacco Use    Smoking status: Current Every Day Smoker     Packs/day: 0.50  Smokeless tobacco: Never Used   Substance and Sexual Activity    Alcohol use: Yes    Drug use: No    Sexual activity: None   Lifestyle    Physical activity     Days per week: None     Minutes per session: None    Stress: None   Relationships    Social connections     Talks on phone: None     Gets together: None     Attends Orthodoxy service: None     Active member of club or organization: None     Attends meetings of clubs or organizations: None     Relationship status: None    Intimate partner violence     Fear of current or ex partner: None     Emotionally abused: None     Physically abused: None     Forced sexual activity: None   Other Topics Concern    None   Social History Narrative    None       PHYSICAL EXAM    (up to 7 for level 4, 8 or more for level 5)     ED Triage Vitals [02/02/21 1757]   BP Temp Temp src Pulse Resp SpO2 Height Weight   121/83 -- -- 79 18 97 % 5' 6\" (1.676 m) 140 lb (63.5 kg)     Constitutional: Oriented and well-developed, well-nourished, and in no distress. Non-toxic appearance. Head: Normocephalic and atraumatic. Eyes: Extra ocular muscles intact. Pupils are equal, round, and reactive to light. No discharge. No scleral icterus. Neck: Normal range of motion and phonation normal. Neck supple. Musculoskeletal: Obvious deformity in the right wrist.  Limited range of motion. Good radial and ulnar pulses. Severe pain to palpation. Neurological: Alert and oriented. Skin: Skin is warm and dry. No rash noted. No cyanosis or erythema. No pallor. Nails show no clubbing.      DIAGNOSTIC RESULTS     EKG: All EKG's are interpreted by the Emergency Department Physician who either signs or Co-signs this chart in the absence of a cardiologist.    RADIOLOGY:   Non-plain film images such as CT, Ultrasound and MRI are read by the radiologist. Plain radiographic images are visualized and preliminarily interpreted by the emergency physician with the below findings:    Interpretation per the Radiologist below, if available at the time of this note:    XR RADIUS ULNA RIGHT (2 VIEWS)   Preliminary Result   Intra-articular distal radius fracture. XR WRIST RIGHT (MIN 3 VIEWS)   Preliminary Result   Intra-articular distal radius fracture. ED BEDSIDE ULTRASOUND:   Performed by ED Physician - none    LABS:  Results for orders placed or performed during the hospital encounter of 02/02/21   CBC Auto Differential   Result Value Ref Range    WBC 10.9 3.5 - 11.3 k/uL    RBC 4.64 3.95 - 5.11 m/uL    Hemoglobin 13.4 11.9 - 15.1 g/dL    Hematocrit 41.2 36.3 - 47.1 %    MCV 88.8 82.6 - 102.9 fL    MCH 28.9 25.2 - 33.5 pg    MCHC 32.5 28.4 - 34.8 g/dL    RDW 12.1 11.8 - 14.4 %    Platelets 131 727 - 600 k/uL    MPV 9.3 8.1 - 13.5 fL    NRBC Automated 0.0 0.0 per 100 WBC    Differential Type NOT REPORTED     Seg Neutrophils 55 36 - 65 %    Lymphocytes 39 24 - 43 %    Monocytes 4 3 - 12 %    Eosinophils % 2 1 - 4 %    Basophils 0 0 - 2 %    Immature Granulocytes 0 0 %    Segs Absolute 5.89 1.50 - 8.10 k/uL    Absolute Lymph # 4.27 (H) 1.10 - 3.70 k/uL    Absolute Mono # 0.43 0.10 - 1.20 k/uL    Absolute Eos # 0.24 0.00 - 0.44 k/uL    Basophils Absolute 0.04 0.00 - 0.20 k/uL    Absolute Immature Granulocyte 0.03 0.00 - 0.30 k/uL    WBC Morphology NOT REPORTED     RBC Morphology NOT REPORTED     Platelet Estimate NOT REPORTED      Orders Placed This Encounter   Procedures    XR RADIUS ULNA RIGHT (2 VIEWS)    XR WRIST RIGHT (MIN 3 VIEWS)    CBC Auto Differential    Basic Metabolic Panel w/ Reflex to MG    Sugar Tong OCL Splint Arm    Slings       All other labs were within normal range or not returned as of this dictation.     EMERGENCY DEPARTMENT COURSE and DIFFERENTIAL DIAGNOSIS/MDM:   Vitals:    Vitals:    02/02/21 1757   BP: 121/83   Pulse: 79   Resp: 18   SpO2: 97%   Weight: 140 lb (63.5 kg)   Height: 5' 6\" (1.676 m)       MEDICAL DECISION MAKING:  Patient was discharged in stable condition. She was directed to follow-up with Dr. Nahid Gold tomorrow. Patient was given morphine here and she states she has tolerated that in the past.  She was given Percocet on outpatient basis. She was placed in a sugar tong splint and a sling. The patient was evaluated during the global COVID-19 pandemic, and that diagnosis was considered upon their initial presentation. Their evaluation, treatment and testing was consistent with current guidelines for patients who present with complaints or symptoms that may be related to COVID-19. Full PPE including gloves, gown, N95 or P100 respirator, and eye protection was used during every encounter with this patient. Procedure:  2 separate blocks were done, the first was done into the area of injury at the distal radius region using 5 mL of 1% lidocaine. Approximately 1 hour later the patient reports that her pain is back. A second block was done using bupivacaine 0.5%. Patient tolerated the procedure well. Patient remained with good radial and ulnar pulses. CONSULTS:  Dr. Joaquim Hashimoto -agreed to see the patient in the morning. FINAL IMPRESSION      1. Other closed intra-articular fracture of distal end of right radius, initial encounter Stable         DISPOSITION/PLAN   DISPOSITION        PATIENT REFERRED TO:  Erika Angel MD  [address] [facility]  [phone]    Go in 1 day        DISCHARGE MEDICATIONS:  Current Discharge Medication List        Controlled Substances Monitoring:     RX Monitoring 7/11/2019   Attestation -   Periodic Controlled Substance Monitoring No signs of potential drug abuse or diversion identified.        (Please note that portions of this note were completed with a voice recognition program.  Efforts were made to edit the dictations but occasionally words are mis-transcribed.)    Electronically signed by DORA Boles CNP on 2/2/2021 at 7:11 PM Arie Quiroz, APRN - CNP  02/02/21 [address], [facility] - CNP  02/02/21 1931

## 2021-02-03 ENCOUNTER — HOSPITAL ENCOUNTER (OUTPATIENT)
Age: 51
Discharge: HOME | End: 2021-02-03
Payer: COMMERCIAL

## 2021-02-03 LAB
ABSOLUTE EOS #: 0.18 K/UL (ref 0–0.44)
ABSOLUTE IMMATURE GRANULOCYTE: 0.03 K/UL (ref 0–0.3)
ABSOLUTE LYMPH #: 2.86 K/UL (ref 1.1–3.7)
ABSOLUTE MONO #: 0.45 K/UL (ref 0.1–1.2)
ANION GAP SERPL CALCULATED.3IONS-SCNC: 9 MMOL/L (ref 9–17)
BASOPHILS # BLD: 1 % (ref 0–2)
BASOPHILS ABSOLUTE: 0.05 K/UL (ref 0–0.2)
BUN BLDV-MCNC: 8 MG/DL (ref 6–20)
BUN/CREAT BLD: 11 (ref 9–20)
CALCIUM SERPL-MCNC: 9.2 MG/DL (ref 8.6–10.4)
CHLORIDE BLD-SCNC: 102 MMOL/L (ref 98–107)
CO2: 25 MMOL/L (ref 20–31)
CREAT SERPL-MCNC: 0.73 MG/DL (ref 0.5–0.9)
DIFFERENTIAL TYPE: NORMAL
EOSINOPHILS RELATIVE PERCENT: 2 % (ref 1–4)
GFR AFRICAN AMERICAN: >60 ML/MIN
GFR NON-AFRICAN AMERICAN: >60 ML/MIN
GFR SERPL CREATININE-BSD FRML MDRD: ABNORMAL ML/MIN/{1.73_M2}
GFR SERPL CREATININE-BSD FRML MDRD: ABNORMAL ML/MIN/{1.73_M2}
GLUCOSE BLD-MCNC: 100 MG/DL (ref 70–99)
HCT VFR BLD CALC: 42.5 % (ref 36.3–47.1)
HEMOGLOBIN: 13.6 G/DL (ref 11.9–15.1)
IMMATURE GRANULOCYTES: 0 %
LYMPHOCYTES # BLD: 30 % (ref 24–43)
MCH RBC QN AUTO: 29.4 PG (ref 25.2–33.5)
MCHC RBC AUTO-ENTMCNC: 32 G/DL (ref 28.4–34.8)
MCV RBC AUTO: 91.8 FL (ref 82.6–102.9)
MONOCYTES # BLD: 5 % (ref 3–12)
NRBC AUTOMATED: 0 PER 100 WBC
PDW BLD-RTO: 12.2 % (ref 11.8–14.4)
PLATELET # BLD: 337 K/UL (ref 138–453)
PLATELET ESTIMATE: NORMAL
PMV BLD AUTO: 8.7 FL (ref 8.1–13.5)
POTASSIUM SERPL-SCNC: 4.1 MMOL/L (ref 3.7–5.3)
RBC # BLD: 4.63 M/UL (ref 3.95–5.11)
RBC # BLD: NORMAL 10*6/UL
SEG NEUTROPHILS: 62 % (ref 36–65)
SEGMENTED NEUTROPHILS ABSOLUTE COUNT: 5.84 K/UL (ref 1.5–8.1)
SODIUM BLD-SCNC: 136 MMOL/L (ref 135–144)
WBC # BLD: 9.4 K/UL (ref 3.5–11.3)
WBC # BLD: NORMAL 10*3/UL

## 2021-02-03 PROCEDURE — 80048 BASIC METABOLIC PNL TOTAL CA: CPT

## 2021-02-03 PROCEDURE — 36415 COLL VENOUS BLD VENIPUNCTURE: CPT

## 2021-02-03 PROCEDURE — 85025 COMPLETE CBC W/AUTO DIFF WBC: CPT

## 2021-02-03 RX ORDER — OXYCODONE HYDROCHLORIDE AND ACETAMINOPHEN 5; 325 MG/1; MG/1
1 TABLET ORAL EVERY 4 HOURS PRN
COMMUNITY
End: 2021-10-06 | Stop reason: ALTCHOICE

## 2021-02-03 NOTE — ED NOTES
Pt instructed on splint care and follow up with Ortho, pt and  verbalized understanding.       Rosalee Powers RN  02/02/21 1946

## 2021-02-03 NOTE — PROGRESS NOTES
Patient instructed on the pre-operative, intra-operative, and post-operative process. Patient instructed on NPO status. Medication instructions and Pre operative instruction sheet reviewed over the phone. Instructed pt to take clonazepam and paxil with a small sip of water prior to arriving to the hospital the day of surgery. Pt is getting covid test done at [facility].

## 2021-02-04 ENCOUNTER — ANESTHESIA EVENT (OUTPATIENT)
Dept: OPERATING ROOM | Age: 51
End: 2021-02-04
Payer: COMMERCIAL

## 2021-02-05 ENCOUNTER — HOSPITAL ENCOUNTER (OUTPATIENT)
Age: 51
Setting detail: OUTPATIENT SURGERY
Discharge: HOME | End: 2021-02-05
Attending: ORTHOPAEDIC SURGERY | Admitting: ORTHOPAEDIC SURGERY
Payer: COMMERCIAL

## 2021-02-05 ENCOUNTER — APPOINTMENT (OUTPATIENT)
Dept: GENERAL RADIOLOGY | Age: 51
End: 2021-02-05
Attending: ORTHOPAEDIC SURGERY
Payer: COMMERCIAL

## 2021-02-05 ENCOUNTER — ANESTHESIA (OUTPATIENT)
Dept: OPERATING ROOM | Age: 51
End: 2021-02-05
Payer: COMMERCIAL

## 2021-02-05 VITALS — DIASTOLIC BLOOD PRESSURE: 106 MMHG | SYSTOLIC BLOOD PRESSURE: 155 MMHG | OXYGEN SATURATION: 97 %

## 2021-02-05 VITALS
BODY MASS INDEX: 22.5 KG/M2 | DIASTOLIC BLOOD PRESSURE: 58 MMHG | SYSTOLIC BLOOD PRESSURE: 90 MMHG | TEMPERATURE: 97.4 F | OXYGEN SATURATION: 94 % | HEART RATE: 69 BPM | RESPIRATION RATE: 18 BRPM | HEIGHT: 66 IN | WEIGHT: 140 LBS

## 2021-02-05 PROCEDURE — 64415 NJX AA&/STRD BRCH PLXS IMG: CPT | Performed by: NURSE ANESTHETIST, CERTIFIED REGISTERED

## 2021-02-05 PROCEDURE — 3600000014 HC SURGERY LEVEL 4 ADDTL 15MIN: Performed by: ORTHOPAEDIC SURGERY

## 2021-02-05 PROCEDURE — 7100000010 HC PHASE II RECOVERY - FIRST 15 MIN: Performed by: ORTHOPAEDIC SURGERY

## 2021-02-05 PROCEDURE — 3700000000 HC ANESTHESIA ATTENDED CARE: Performed by: ORTHOPAEDIC SURGERY

## 2021-02-05 PROCEDURE — 6360000002 HC RX W HCPCS: Performed by: NURSE ANESTHETIST, CERTIFIED REGISTERED

## 2021-02-05 PROCEDURE — 3209999900 FLUORO FOR SURGICAL PROCEDURES

## 2021-02-05 PROCEDURE — 3700000001 HC ADD 15 MINUTES (ANESTHESIA): Performed by: ORTHOPAEDIC SURGERY

## 2021-02-05 PROCEDURE — 6360000002 HC RX W HCPCS: Performed by: ORTHOPAEDIC SURGERY

## 2021-02-05 PROCEDURE — 2709999900 HC NON-CHARGEABLE SUPPLY: Performed by: ORTHOPAEDIC SURGERY

## 2021-02-05 PROCEDURE — 7100000011 HC PHASE II RECOVERY - ADDTL 15 MIN: Performed by: ORTHOPAEDIC SURGERY

## 2021-02-05 PROCEDURE — C1713 ANCHOR/SCREW BN/BN,TIS/BN: HCPCS | Performed by: ORTHOPAEDIC SURGERY

## 2021-02-05 PROCEDURE — 2580000003 HC RX 258: Performed by: ORTHOPAEDIC SURGERY

## 2021-02-05 PROCEDURE — 3600000004 HC SURGERY LEVEL 4 BASE: Performed by: ORTHOPAEDIC SURGERY

## 2021-02-05 PROCEDURE — 2720000010 HC SURG SUPPLY STERILE: Performed by: ORTHOPAEDIC SURGERY

## 2021-02-05 PROCEDURE — 2500000003 HC RX 250 WO HCPCS: Performed by: NURSE ANESTHETIST, CERTIFIED REGISTERED

## 2021-02-05 DEVICE — SCREW BNE L16MM DIA2.4MM DST RAD VOLAR S STL ST VAR ANG LOK: Type: IMPLANTABLE DEVICE | Site: WRIST | Status: FUNCTIONAL

## 2021-02-05 DEVICE — SCREW BNE L14MM DIA2.7MM CORT S STL ST T8 STARDRV RECESS: Type: IMPLANTABLE DEVICE | Site: WRIST | Status: FUNCTIONAL

## 2021-02-05 DEVICE — SCREW BNE L14MM DIA2.4MM DST RAD VOLAR S STL ST VAR ANG LOK: Type: IMPLANTABLE DEVICE | Site: WRIST | Status: FUNCTIONAL

## 2021-02-05 DEVICE — SCREW BNE L18MM DIA2.4MM DST RAD VOLAR S STL ST VAR ANG LOK: Type: IMPLANTABLE DEVICE | Site: WRIST | Status: FUNCTIONAL

## 2021-02-05 DEVICE — PLATE BNE W22XL54MM STD 9 H R DST RAD VOLAR S STL VAR ANG: Type: IMPLANTABLE DEVICE | Site: WRIST | Status: FUNCTIONAL

## 2021-02-05 DEVICE — SCREW BNE L12MM DIA2.4MM DST RAD VOLAR S STL ST VAR ANG LOK: Type: IMPLANTABLE DEVICE | Site: WRIST | Status: FUNCTIONAL

## 2021-02-05 DEVICE — SCREW BNE L20MM DIA2.4MM DST RAD VOLAR S STL ST VAR ANG LOK: Type: IMPLANTABLE DEVICE | Site: WRIST | Status: FUNCTIONAL

## 2021-02-05 RX ORDER — CEFAZOLIN SODIUM 2 G/50ML
2000 SOLUTION INTRAVENOUS ONCE
Status: COMPLETED | OUTPATIENT
Start: 2021-02-05 | End: 2021-02-05

## 2021-02-05 RX ORDER — ACETAMINOPHEN 325 MG/1
650 TABLET ORAL ONCE
Status: DISCONTINUED | OUTPATIENT
Start: 2021-02-05 | End: 2021-02-05 | Stop reason: HOSPADM

## 2021-02-05 RX ORDER — ROPIVACAINE HYDROCHLORIDE 5 MG/ML
INJECTION, SOLUTION EPIDURAL; INFILTRATION; PERINEURAL PRN
Status: DISCONTINUED | OUTPATIENT
Start: 2021-02-05 | End: 2021-02-05 | Stop reason: SDUPTHER

## 2021-02-05 RX ORDER — EPHEDRINE SULFATE/0.9% NACL/PF 50 MG/5 ML
SYRINGE (ML) INTRAVENOUS PRN
Status: DISCONTINUED | OUTPATIENT
Start: 2021-02-05 | End: 2021-02-05 | Stop reason: SDUPTHER

## 2021-02-05 RX ORDER — SUCCINYLCHOLINE/SOD CL,ISO/PF 100 MG/5ML
SYRINGE (ML) INTRAVENOUS PRN
Status: DISCONTINUED | OUTPATIENT
Start: 2021-02-05 | End: 2021-02-05 | Stop reason: SDUPTHER

## 2021-02-05 RX ORDER — METOCLOPRAMIDE HYDROCHLORIDE 5 MG/ML
10 INJECTION INTRAMUSCULAR; INTRAVENOUS
Status: COMPLETED | OUTPATIENT
Start: 2021-02-05 | End: 2021-02-05

## 2021-02-05 RX ORDER — ONDANSETRON 2 MG/ML
4 INJECTION INTRAMUSCULAR; INTRAVENOUS
Status: COMPLETED | OUTPATIENT
Start: 2021-02-05 | End: 2021-02-05

## 2021-02-05 RX ORDER — ONDANSETRON 2 MG/ML
INJECTION INTRAMUSCULAR; INTRAVENOUS PRN
Status: DISCONTINUED | OUTPATIENT
Start: 2021-02-05 | End: 2021-02-05 | Stop reason: SDUPTHER

## 2021-02-05 RX ORDER — DEXAMETHASONE SODIUM PHOSPHATE 10 MG/ML
INJECTION INTRAMUSCULAR; INTRAVENOUS PRN
Status: DISCONTINUED | OUTPATIENT
Start: 2021-02-05 | End: 2021-02-05 | Stop reason: SDUPTHER

## 2021-02-05 RX ORDER — SODIUM CHLORIDE, SODIUM LACTATE, POTASSIUM CHLORIDE, CALCIUM CHLORIDE 600; 310; 30; 20 MG/100ML; MG/100ML; MG/100ML; MG/100ML
INJECTION, SOLUTION INTRAVENOUS CONTINUOUS
Status: DISCONTINUED | OUTPATIENT
Start: 2021-02-05 | End: 2021-02-05 | Stop reason: HOSPADM

## 2021-02-05 RX ORDER — MIDAZOLAM HYDROCHLORIDE 1 MG/ML
INJECTION INTRAMUSCULAR; INTRAVENOUS PRN
Status: DISCONTINUED | OUTPATIENT
Start: 2021-02-05 | End: 2021-02-05 | Stop reason: SDUPTHER

## 2021-02-05 RX ORDER — LIDOCAINE HYDROCHLORIDE 20 MG/ML
INJECTION, SOLUTION EPIDURAL; INFILTRATION; INTRACAUDAL; PERINEURAL PRN
Status: DISCONTINUED | OUTPATIENT
Start: 2021-02-05 | End: 2021-02-05 | Stop reason: SDUPTHER

## 2021-02-05 RX ORDER — PROPOFOL 10 MG/ML
INJECTION, EMULSION INTRAVENOUS PRN
Status: DISCONTINUED | OUTPATIENT
Start: 2021-02-05 | End: 2021-02-05 | Stop reason: SDUPTHER

## 2021-02-05 RX ORDER — DEXAMETHASONE SODIUM PHOSPHATE 4 MG/ML
INJECTION, SOLUTION INTRA-ARTICULAR; INTRALESIONAL; INTRAMUSCULAR; INTRAVENOUS; SOFT TISSUE PRN
Status: DISCONTINUED | OUTPATIENT
Start: 2021-02-05 | End: 2021-02-05 | Stop reason: SDUPTHER

## 2021-02-05 RX ORDER — FENTANYL CITRATE 50 UG/ML
INJECTION, SOLUTION INTRAMUSCULAR; INTRAVENOUS PRN
Status: DISCONTINUED | OUTPATIENT
Start: 2021-02-05 | End: 2021-02-05 | Stop reason: SDUPTHER

## 2021-02-05 RX ORDER — DIMENHYDRINATE 50 MG/1
50 TABLET ORAL ONCE
Status: DISCONTINUED | OUTPATIENT
Start: 2021-02-05 | End: 2021-02-05 | Stop reason: HOSPADM

## 2021-02-05 RX ADMIN — METOCLOPRAMIDE 10 MG: 5 INJECTION, SOLUTION INTRAMUSCULAR; INTRAVENOUS at 16:24

## 2021-02-05 RX ADMIN — ROPIVACAINE HYDROCHLORIDE 20 ML: 5 INJECTION, SOLUTION EPIDURAL; INFILTRATION; PERINEURAL at 14:30

## 2021-02-05 RX ADMIN — DEXAMETHASONE SODIUM PHOSPHATE 10 MG: 10 INJECTION INTRAMUSCULAR; INTRAVENOUS at 14:30

## 2021-02-05 RX ADMIN — Medication 100 MG: at 14:40

## 2021-02-05 RX ADMIN — ONDANSETRON 4 MG: 2 INJECTION INTRAMUSCULAR; INTRAVENOUS at 16:22

## 2021-02-05 RX ADMIN — DEXAMETHASONE SODIUM PHOSPHATE 8 MG: 4 INJECTION, SOLUTION INTRAMUSCULAR; INTRAVENOUS at 15:08

## 2021-02-05 RX ADMIN — Medication 5 MG: at 15:08

## 2021-02-05 RX ADMIN — CEFAZOLIN SODIUM 2000 MG: 2 SOLUTION INTRAVENOUS at 14:33

## 2021-02-05 RX ADMIN — ONDANSETRON 4 MG: 2 INJECTION INTRAMUSCULAR; INTRAVENOUS at 15:38

## 2021-02-05 RX ADMIN — MIDAZOLAM 2 MG: 1 INJECTION INTRAMUSCULAR; INTRAVENOUS at 14:23

## 2021-02-05 RX ADMIN — PROPOFOL 200 MG: 10 INJECTION, EMULSION INTRAVENOUS at 14:40

## 2021-02-05 RX ADMIN — FENTANYL CITRATE 100 MCG: 50 INJECTION, SOLUTION INTRAMUSCULAR; INTRAVENOUS at 14:23

## 2021-02-05 RX ADMIN — SODIUM CHLORIDE, POTASSIUM CHLORIDE, SODIUM LACTATE AND CALCIUM CHLORIDE: 600; 310; 30; 20 INJECTION, SOLUTION INTRAVENOUS at 15:12

## 2021-02-05 RX ADMIN — SODIUM CHLORIDE, POTASSIUM CHLORIDE, SODIUM LACTATE AND CALCIUM CHLORIDE: 600; 310; 30; 20 INJECTION, SOLUTION INTRAVENOUS at 14:09

## 2021-02-05 RX ADMIN — MIDAZOLAM 2 MG: 1 INJECTION INTRAMUSCULAR; INTRAVENOUS at 14:24

## 2021-02-05 RX ADMIN — LIDOCAINE HYDROCHLORIDE 100 MG: 20 INJECTION, SOLUTION EPIDURAL; INFILTRATION; INTRACAUDAL; PERINEURAL at 14:40

## 2021-02-05 ASSESSMENT — LIFESTYLE VARIABLES: SMOKING_STATUS: 1

## 2021-02-05 NOTE — ANESTHESIA PRE PROCEDURE
Department of Anesthesiology  Preprocedure Note       Name:  Destin Benito   Age:  48 y.o.  :  1970                                          MRN:  [id number]         Date:  2021      Surgeon: Nishant Hughes):  Nabil Thomas MD    Procedure: Procedure(s):  WRIST OPEN REDUCTION INTERNAL FIXATION-DISTAL RADIUS    Medications prior to admission:   Prior to Admission medications    Medication Sig Start Date End Date Taking? Authorizing Provider   oxyCODONE-acetaminophen (PERCOCET) 5-325 MG per tablet Take 1 tablet by mouth every 4 hours as needed for Pain. Yes Historical Provider, MD   meloxicam (MOBIC) 15 MG tablet Take 15 mg by mouth daily   Yes Historical Provider, MD   PARoxetine (PAXIL) 10 MG tablet Take 1 tablet by mouth daily 10/30/20  Yes Raeann Arce DO   QUEtiapine (SEROQUEL) 50 MG tablet Take 1.5 tablets by mouth nightly 10/8/20  Yes Raeann Arce DO   ibuprofen (ADVIL;MOTRIN) 600 MG tablet Take 1 tablet by mouth 4 times daily as needed for Pain 10/7/20  Yes Reina Baird MD   pimecrolimus (ELIDEL) 1 % cream Apply topically 2 times daily.  21   Raeann Arce DO   clonazePAM (KLONOPIN) 0.5 MG tablet TAKE 1 TO 2 TABLETS BY MOUTH EVERY DAY AS NEEDED FOR ANXIETY 10/8/20 1/6/21  Raeann Arce DO   nicotine (NICODERM CQ) 14 MG/24HR Place 1 patch onto the skin daily for 14 days 9/14/20 10/7/20  Abbie Ríos MD   Cholecalciferol (VITAMIN D) 50 MCG (2000 UT) CAPS capsule Take 2,000 Units by mouth daily    Historical Provider, MD   nicotine (NICODERM CQ) 7 MG/24HR Place 1 patch onto the skin daily for 14 days 20  Abbie Ríos MD       Current medications:    Current Facility-Administered Medications   Medication Dose Route Frequency Provider Last Rate Last Admin    ceFAZolin (ANCEF) 2000 mg in dextrose 3 % 50 mL IVPB (duplex)  2,000 mg Intravenous Once Nabil Thomas MD  lactated ringers infusion   Intravenous Continuous Diane Lemus  mL/hr at 02/05/21 1409 New Bag at 02/05/21 1409    dimenhyDRINATE (DRAMAMINE) tablet 50 mg  50 mg Oral Once Diane Lemus MD        acetaminophen (TYLENOL) tablet 650 mg  650 mg Oral Once Diane Lemus MD           Allergies: Allergies   Allergen Reactions    Codeine Anaphylaxis    Effexor [Venlafaxine] Other (See Comments)     Slurred speech, disoriented, tripping    Xanax [Alprazolam] Other (See Comments)     Night terrors       Problem List:    Patient Active Problem List   Diagnosis Code    OCD (obsessive compulsive disorder) F42.9    Anxiety F41.9    Fatigue R53.83    Primary fibromyalgia syndrome M79.7       Past Medical History:        Diagnosis Date    Anxiety        Past Surgical History:        Procedure Laterality Date    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 Joyce Martinez @ [facility]--Medical therapy    LAPAROSCOPY         Social History:    Social History     Tobacco Use    Smoking status: Current Every Day Smoker     Packs/day: 0.50    Smokeless tobacco: Never Used   Substance Use Topics    Alcohol use:  Yes                                Ready to quit: Not Answered  Counseling given: Not Answered      Vital Signs (Current):   Vitals:    02/03/21 1532 02/05/21 1348   BP:  131/80   Pulse:  61   Resp:  16   Temp:  37.2 °C (99 °F)   TempSrc:  Temporal   SpO2:  98%   Weight: 140 lb (63.5 kg) 140 lb (63.5 kg)   Height: 5' 6\" (1.676 m) 5' 6\" (1.676 m)                                              BP Readings from Last 3 Encounters:   02/05/21 131/80   02/02/21 121/81   01/07/21 120/78       NPO Status: Time of last liquid consumption: 2000(sips with pills this am at 0730)                        Time of last solid consumption: 2000                        Date of last liquid consumption: 02/04/21 Date of last solid food consumption: 02/04/21    BMI:   Wt Readings from Last 3 Encounters:   02/05/21 140 lb (63.5 kg)   02/02/21 140 lb (63.5 kg)   01/07/21 144 lb (65.3 kg)     Body mass index is 22.6 kg/m². CBC:   Lab Results   Component Value Date    WBC 9.4 02/03/2021    RBC 4.63 02/03/2021    HGB 13.6 02/03/2021    HCT 42.5 02/03/2021    MCV 91.8 02/03/2021    RDW 12.2 02/03/2021     02/03/2021       CMP:   Lab Results   Component Value Date     02/03/2021    K 4.1 02/03/2021     02/03/2021    CO2 25 02/03/2021    BUN 8 02/03/2021    CREATININE 0.73 02/03/2021    GFRAA >60 02/03/2021    LABGLOM >60 02/03/2021    GLUCOSE 100 02/03/2021    PROT 8.6 08/17/2020    CALCIUM 9.2 02/03/2021    BILITOT 0.74 08/17/2020    ALKPHOS 102 08/17/2020    AST 18 08/17/2020    ALT 11 08/17/2020       POC Tests: No results for input(s): POCGLU, POCNA, POCK, POCCL, POCBUN, POCHEMO, POCHCT in the last 72 hours. Coags: No results found for: PROTIME, INR, APTT    HCG (If Applicable): No results found for: PREGTESTUR, PREGSERUM, HCG, HCGQUANT     ABGs: No results found for: PHART, PO2ART, CRC9YRQ, IKD9CZR, BEART, H0TNTFIK     Type & Screen (If Applicable):  No results found for: LABABO, LABRH    Drug/Infectious Status (If Applicable):  No results found for: HIV, HEPCAB    COVID-19 Screening (If Applicable):   Lab Results   Component Value Date    COVID19 DETECTED 11/30/2020         Anesthesia Evaluation   no history of anesthetic complications:   Airway: Mallampati: II  TM distance: >3 FB   Neck ROM: full  Mouth opening: > = 3 FB Dental: normal exam         Pulmonary:normal exam    (+) current smoker          Patient smoked on day of surgery.                  Cardiovascular:  Exercise tolerance: good (>4 METS),                     Neuro/Psych:   (+) psychiatric history:depression/anxiety             GI/Hepatic/Renal: Neg GI/Hepatic/Renal ROS Endo/Other: Negative Endo/Other ROS                    Abdominal:           Vascular: negative vascular ROS. Anesthesia Plan      general     ASA 2     (Discussed PNB with pt, consented)        Anesthetic plan and risks discussed with patient.                       DORA Mclean - CRNA   2/5/2021

## 2021-02-05 NOTE — PROGRESS NOTES
Discharge instructions reviewed with patient and visitor. Both verbalized understanding and denied questions. Patient requests a sling. Simple sling given as requested. Discharge Criteria    Inpatients must meet Criteria 1 through 7. All other patients are either YES or N/A. If a NO is chosen then Anesthesia or Surgeon must be notified. 1.  Minimum 30 minutes after last dose of sedative medication, minimum 120 minutes after last dose of reversal agent. Yes      2. Systolic BP stable within 20 mmHg for 30 minutes & systolic BP between 90 & 784 or within 10 mmHg of baseline. Yes      3. Pulse between 60 and 100 or within 10 bpm of baseline. Yes      4. Spontaneous respiratory rate >/= 10 per minute. Yes      5. SaO2 >/= 95 or  >/= baseline. Yes      6. Able to cough and swallow or return to baseline function. Yes      7. Alert and oriented or return to baseline mental status. Yes      8. Demonstrates controlled, coordinated movements, ambulates with steady gait, or return to baseline activity function. Yes      9. Minimal or no pain or nausea, or at a level tolerable and acceptable to patient. Yes      10. Takes and retains oral fluids as allowed. Yes      11. Procedural / perioperative site stable. Minimal or no bleeding. Yes          12. If GI endoscopy procedure, minimal or no abdominal distention or passing flatus. N/A      13. Written discharge instructions and emergency telephone number provided. Yes      14. Accompanied by a responsible adult.     Yes

## 2021-02-05 NOTE — OP NOTE
Operative Note      Patient: José Hernandez  YOB: 1970  MRN: [id number]    Date of Procedure: 2/5/2021    Pre-Op Diagnosis: DISPLACED RIGHT DISTAL RADIUS    Post-Op Diagnosis: Same       Procedure(s):  WRIST OPEN REDUCTION INTERNAL FIXATION-DISTAL RADIUS, right    Surgeon(s):  Diane Lemus MD    Assistant:   First Assistant: DORA Conley - CNP    Anesthesia: General    Estimated Blood Loss (mL): Minimal    Complications: None    Specimens:   * No specimens in log *    Implants:  * No implants in log *      Drains: * No LDAs found *    Findings: Reduced distal radius fracture with appropriate implant placement and lengths. Detailed Description of Procedure:   After informed consent was obtained the patient brought to the operating room where general anesthetic was administered. Preoperatively a regional block was placed. A well-padded proximal right arm tourniquet was placed in the right arm was prepped and draped in the usual sterile fashion. The arm was elevated, exsanguinated, and the tourniquet was inflated to 200 mmHg. A 6 cm incision was made overlying the flexor carpi radialis tendon overlying the fracture. The FCR was retracted ulnarly along with the flexor pollicis longus. The pronator quadratus was incised in an L-shaped fashion off the distal radius fracture was identified and found to be comminuted. He fracture was reduced and then the plate was provisionally placed and held in position with K wires. This was adjusted until appropriate placement was achieved and then it was secured to the shaft through the oblong hole using a 2.7 bicortical nonlocking screw. Holding the fracture in a reduced position a total of 6 unicortical 2.4 mm locking screws were placed through the distal fracture fragment and then an additional 2 screws were placed in the proximal fracture fragment using 2.4 mm locking screws in a bicortical fashion.     Final x-rays in multiple planes revealed

## 2021-02-05 NOTE — ANESTHESIA PROCEDURE NOTES
Peripheral Block    Patient location during procedure: pre-op  Start time: 2/5/2021 2:26 PM  End time: 2/5/2021 2:30 PM  Staffing  Performed: resident/CRNA   Resident/CRNA: DORA Haro CRNA  Preanesthetic Checklist  Completed: patient identified, IV checked, site marked, risks and benefits discussed, surgical consent, monitors and equipment checked, pre-op evaluation, timeout performed, anesthesia consent given, oxygen available and patient being monitored  Peripheral Block  Patient position: supine  Prep: ChloraPrep  Patient monitoring: continuous pulse ox and IV access  Block type: Brachial plexus  Laterality: right  Injection technique: single-shot  Guidance: ultrasound guided  Local infiltration: ropivacaine and decadron  Infiltration strength: 0.5 %  Dose: 20 mL  Infraclavicular  Provider prep: mask and sterile gloves  Local infiltration: ropivacaine and decadron  Needle  Needle type: pajunk 50mm.    Assessment  Injection assessment: negative aspiration for heme, no paresthesia on injection and local visualized surrounding nerve on ultrasound  Paresthesia pain: none  Slow fractionated injection: yes  Hemodynamics: stable  Reason for block: post-op pain management and at surgeon's request

## 2021-02-08 DIAGNOSIS — F41.9 ANXIETY: ICD-10-CM

## 2021-02-08 RX ORDER — LORAZEPAM 1 MG/1
TABLET ORAL
Qty: 60 TABLET | Refills: 2 | Status: SHIPPED | OUTPATIENT
Start: 2021-02-08 | End: 2021-05-04 | Stop reason: SDUPTHER

## 2021-02-08 RX ORDER — CLONAZEPAM 0.5 MG/1
TABLET ORAL
Qty: 60 TABLET | Refills: 2 | Status: SHIPPED | OUTPATIENT
Start: 2021-02-08 | End: 2021-05-04 | Stop reason: SDUPTHER

## 2021-02-08 NOTE — TELEPHONE ENCOUNTER
Patient asking for Ativan & Klonopin - patient uses [facility] - please note patient had a bad fall on Friday which resulted in surgery    Health Maintenance   Topic Date Due    Hepatitis C screen  1970    Pneumococcal 0-64 years Vaccine (1 of 1 - PPSV23) 07/21/1976    HIV screen  07/21/1985    Cervical cancer screen  07/21/1991    Shingles Vaccine (1 of 2) 07/21/2020    Colon cancer screen colonoscopy  07/21/2020    Flu vaccine (1) 09/01/2020    COVID-19 Vaccine (2 of 2 - Pfizer series) 01/27/2021    Breast cancer screen  09/17/2022    Lipid screen  08/17/2025    DTaP/Tdap/Td vaccine (2 - Td) 10/07/2030    Hepatitis A vaccine  Aged Out    Hepatitis B vaccine  Aged Out    Hib vaccine  Aged Out    Meningococcal (ACWY) vaccine  Aged Out             (applicable per patient's age: Cancer Screenings, Depression Screening, Fall Risk Screening, Immunizations)    LDL Cholesterol (mg/dL)   Date Value   08/17/2020 151 (H)     AST (U/L)   Date Value   08/17/2020 18     ALT (U/L)   Date Value   08/17/2020 11     BUN (mg/dL)   Date Value   02/03/2021 8      (goal A1C is < 7)   (goal LDL is <100) need 30-50% reduction from baseline     BP Readings from Last 3 Encounters:   02/05/21 (!) 155/106   02/05/21 (!) 90/58   02/02/21 121/81    (goal /80)      All Future Testing planned in [facility]:  Lab Frequency Next Occurrence   US BREAST COMPLETE LEFT Once 03/17/2021   TANI DIAGNOSTIC W CAD LEFT Once 03/17/2021   COLONOSCOPY W/ OR W/O BIOPSY Once 12/09/2020   COVID-19 Ambulatory Once 12/30/2020   MRI SHOULDER LEFT WO CONTRAST Once 01/09/2021       Next Visit Date:  No future appointments.          Patient Active Problem List:     OCD (obsessive compulsive disorder)     Anxiety     Fatigue     Primary fibromyalgia syndrome

## 2021-02-19 ENCOUNTER — HOSPITAL ENCOUNTER (OUTPATIENT)
Dept: GENERAL RADIOLOGY | Age: 51
Discharge: HOME | End: 2021-02-21
Payer: COMMERCIAL

## 2021-02-19 ENCOUNTER — HOSPITAL ENCOUNTER (OUTPATIENT)
Age: 51
Discharge: HOME | End: 2021-02-21
Payer: COMMERCIAL

## 2021-02-19 DIAGNOSIS — S52.501A CLOSED FRACTURE OF DISTAL END OF RIGHT RADIUS, UNSPECIFIED FRACTURE MORPHOLOGY, INITIAL ENCOUNTER: ICD-10-CM

## 2021-02-19 PROCEDURE — 73100 X-RAY EXAM OF WRIST: CPT

## 2021-03-01 ENCOUNTER — HOSPITAL ENCOUNTER (OUTPATIENT)
Dept: OCCUPATIONAL THERAPY | Age: 51
Setting detail: THERAPIES SERIES
Discharge: HOME | End: 2021-03-01
Payer: COMMERCIAL

## 2021-03-01 PROCEDURE — 97166 OT EVAL MOD COMPLEX 45 MIN: CPT

## 2021-03-05 NOTE — PROGRESS NOTES
Phone: [phone]          Fax: [phone]                       Outpatient Occupational Therapy                                                                            Evaluation    Date: 3/1/2021  Patient: Pradeep Prado  : 1970  ACCT#: [de-identified]   Referring Practitioner: Dr. Dede Curling    Diagnosis: R wrist fracture   Additional Pertinent Hx: Patient reports she slipped and fell at [facility] on 2021, patient states the squad was called and patient was transferred to [facility].  Patient was placed in a soft cast until she was able to see Dr. Dede Curling. Patient required surgerical intervention, an ORIF was performed on . Onset Date: 21  Total # of Visits Approved: 12 Per Physician Order  Total # of Visits to Date: 1  No Show: 0  Canceled Appointment: 0           Subjective  Hand Dominance: Right  Social/Functional History  Occupation: Full time employment  Type of occupation: Nurse; Regional Support [employer]  Additional Comments: Patient reports she is unable to lift a bag of groceries, push self up from chair, groom hair, clean house, cook meals, difficulty with managing buttons and completing ADL tasks.   IADL History  Occupation: Full time employment  Type of occupation: Nurse; Regional Support Person      Objective                 LUE Edema - Circumference (cm)  Wrist Crease: 14.5 cm  Metacarpals: 18.0 cm  RUE Edema - Circumference (cm)  Wrist Crease: 16.1 cm  Metacarpals: 17.9 cm  LUE AROM (degrees)  L Elbow Flexion 0-145: WFL  L Elbow Extension 145-0: WFL  L Forearm Pron 0-90: 0-90  L Forearm Supination  0-90: 0-90  L Wrist Flexion 0-80: 0-80  L Wrist Extension 0-70: 0-80  L Wrist Radial Deviation 0-20: 0-25  L Wrist Ulnar Deviation 0-45: 0-35  Left Hand AROM (degrees)  Left Hand AROM: Exceptions  L Thumb MCP 0-50: 0-60  L Thumb IP 0-80: 0-80  RUE AROM (degrees)  R Elbow Flexion 0-145: WFL  R Elbow Extension 145-0: WFL  R Forearm Pron 0-90: 0-75  R Forearm Supination  0-90: 0-60  R Wrist Flexion 0-80: 0-15  R Wrist Extension 0-70: 0-20  R Wrist Radial Deviation 0-20: 0-10  R Wrist Ulnar Deviation 0-45: 0-15  Right Hand AROM (degrees)  Right Hand AROM: Exceptions  Right Hand General AROM: D2- 2.5 cm from [facility], D-3-D5- 0 cm  R Thumb MCP 0-50: 0-45  R Thumb IP 0-80: 0-32  R Thumb Opposition: Can touch thumb to each finger tip with extended time. Assessment     Clinical Presentation:  [] Stable/Uncomplicated  [x] Evolving [] Unstable/Unpredictable    The Following Comorbities will impact the patients progression and Plan of Care:   [] Diabetes    [] Stroke  [] Cardiac Disease/Pacemaker [] Previous Orthopedic Injury/Surgery  [] Seizure Disorder   [] WB Restrictions  [] Obesity    [] Neuropathy          Short term goals  Time Frame for Short term goals: 6 visits  Short term goal 1: Patient to be inependent with HEP  Short term goal 2: Patient to demonstrate ability to produce a full fist, touching all fingertips to [facility] in order to grasp small objects without dropping. Long term goals  Time Frame for Long term goals : 18 visits  Long term goal 1: Patient to increase forearm pronation/supination to 85 degrees or more in order to carry objects with B/L arms. Long term goal 2: Patient to increase R wrist flexion/extension to 45 degrees or more in order to play instruments independently. Long term goal 3: Edema Reduction of R wrist circumference to 15.0 cm or less in order to allow for improved ROM and lower pain levels. Long term goal 4: Thumb IP flexion to 55 degrees or more, thumb MCP flexion to 55 degrees or more in order to produce a tight fist to grasp objects and hold guitar pick independently. Patient's Goal:  To be able to play musical instruments again.         Time In: 1510  Time Out: 1600  Timed Coded Minutes: 0  Total Treatment Time: [address]as, OTR/L  3/1/2021

## 2021-03-05 NOTE — PLAN OF CARE
Phone: [phone]         Fax: [phone]                       Outpatient Occupational Therapy                                                                         Plan of Care    Date: 3/1/2021  Patient: Mirella Holm  : 1970  ACCT #: [de-identified]    [facility]#: [id number]  Referring Practitioner: Dr. Negra Irwin    Diagnosis: R wrist fracture  Additional Pertinent Hx: Patient reports she slipped and fell at [facility] on 2021, patient states the squad was called and patient was transferred to [facility].  Patient was placed in a soft cast until she was able to see Dr. Negra Irwin. Patient required surgerical intervention, an ORIF was performed on . Onset Date: 21  Total # of Visits Approved: 12 Per Physician Order  Total # of Visits to Date: 1  No Show: 0  Canceled Appointment: 0           Assessment   During evaluation patient demonstrated poor ROM of the wrist, forearm, and fingers as well as edema, and poor strength of the R UE. Patient is also struggling with fine motor coordination tasks due to poor ROM. Recommend OT services to address the above mentioned deficits in order to maximize independence with all ADL and IADL tasks and return to work full duty as well as responsibilities within [employer].        PLAN     Frequency: 2-3 times/wk  Duration:  18 visits   [x] HP/CP      [] Electrical Stimulation   [x]  Strengthening    [x]  Active/Passive ROM  [] Muscle Re-education    [x] Fine Motor Coordination    []  Ultrasound   [x]  Splinting  [] Developmental Therapy    [] Sensory Integration [x]  Patient Education/HEP [] Visual Perception Retraining   [x] ADL Training   [] Cognitive Retraining  [] Fluidotherapy  [x] Paraffin   [x] Therapeutic Exercise  [x] Therapeutic Activity  [x] Other: manual    GOALS  Short term goals  Time Frame for Short term goals: 6 visits  Short term goal 1: Patient to be inependent with HEP  Short term goal 2: Patient to demonstrate ability to produce a full fist, touching all fingertips to [facility] in order to grasp small objects without dropping. Long term goals  Time Frame for Long term goals : 18 visits  Long term goal 1: Patient to increase forearm pronation/supination to 85 degrees or more in order to carry objects with B/L arms. Long term goal 2: Patient to increase R wrist flexion/extension to 45 degrees or more in order to play instruments independently. Long term goal 3: Edema Reduction of R wrist circumference to 15.0 cm or less in order to allow for improved ROM and lower pain levels. Long term goal 4: Thumb IP flexion to 55 degrees or more, thumb MCP flexion to 55 degrees or more in order to produce a tight fist to grasp objects and hold guitar pick independently. KENNEDY Jane/TONY                    Date: 3/1/2021      _____________________________________ Date: 3/1/2021  Physician Signature    By signing above or cosigning electronically, I have reviewed this Plan of Care and certify a need for medically necessary rehabilitation services.

## 2021-03-08 ENCOUNTER — HOSPITAL ENCOUNTER (OUTPATIENT)
Dept: OCCUPATIONAL THERAPY | Age: 51
Setting detail: THERAPIES SERIES
Discharge: HOME | End: 2021-03-08
Payer: COMMERCIAL

## 2021-03-08 PROCEDURE — 97110 THERAPEUTIC EXERCISES: CPT

## 2021-03-08 PROCEDURE — 97140 MANUAL THERAPY 1/> REGIONS: CPT

## 2021-03-08 NOTE — PROGRESS NOTES
Phone: [address] Shah    Fax: [phone]                       Outpatient Occupational Therapy                                                                            Daily Note  Date: 3/8/2021   MRN: [id number]    ACCT#: [de-identified]  Patient: Dave Lynn  : 1970  Referring Practitioner: Dr. Juancarlos Latham    Diagnosis: R wrist fracture     Onset Date: 21  Total # of Visits Approved: 12 Per Physician Order  Total # of Visits to Date: 2  No Show: 0  Canceled Appointment: 0       Pre-Treament Pain: 6/10  Pain at present: 6  Subjective: \"It is already hurting today. \"      Exercises/Modalities:  See DocFlow Sheet    Assessment  Assessment: Fair tolerance of treatment session. Reported pain with STM as well as passive and AROM of the wrist and digits. Patient reports she continues to have severe pain in the thumb as well as along the first metacarpal on the dorsal aspect. Patient c/o pain and redness to the ulnar styloid due to brace. Therapist placed a piece of thick padding to the area to decrease redness. Will continue to progress as patient tolerates. Prognosis: Good             Post Treatment Pain: 6/10      Goals  Short Term Goals  Time Frame for Short term goals: 6 visits  Short term goal 1: Patient to be inependent with HEP  Short term goal 2: Patient to demonstrate ability to produce a full fist, touching all fingertips to [facility] in order to grasp small objects without dropping. Long Term Goals  Time Frame for Long term goals : 18 visits  Long term goal 1: Patient to increase forearm pronation/supination to 85 degrees or more in order to carry objects with B/L arms. Long term goal 2: Patient to increase R wrist flexion/extension to 45 degrees or more in order to play instruments independently. Long term goal 3: Edema Reduction of R wrist circumference to 15.0 cm or less in order to allow for improved ROM and lower pain levels.   Long term goal 4: Thumb IP flexion to 55 degrees or more, thumb MCP flexion to 55 degrees or more in order to produce a tight fist to grasp objects and hold guitar pick independently.              Time In: 1535  Time Out: 1620  Timed Coded Minutes: 45  Total Treatment Time: 2718 COINTERRA  OTR/L  Date: 3/8/2021

## 2021-03-11 ENCOUNTER — APPOINTMENT (OUTPATIENT)
Dept: OCCUPATIONAL THERAPY | Age: 51
End: 2021-03-11
Payer: COMMERCIAL

## 2021-03-12 ENCOUNTER — HOSPITAL ENCOUNTER (OUTPATIENT)
Dept: OCCUPATIONAL THERAPY | Age: 51
Setting detail: THERAPIES SERIES
Discharge: HOME | End: 2021-03-12
Payer: COMMERCIAL

## 2021-03-12 PROCEDURE — 97110 THERAPEUTIC EXERCISES: CPT

## 2021-03-12 PROCEDURE — 97140 MANUAL THERAPY 1/> REGIONS: CPT

## 2021-03-12 NOTE — PROGRESS NOTES
Phone: [phone]    Fax: [phone]                       Outpatient Occupational Therapy                                                                            Daily Note  Date: 3/12/2021   MRN: [id number]    ACCT#: [de-identified]  Patient: Flynn Flood  : 1970  Referring Practitioner: Dr. Jeanette Garibay    Diagnosis: R wrist fracture     Onset Date: 21  Total # of Visits Approved: 12 Per Physician Order  Total # of Visits to Date: 3  No Show: 0  Canceled Appointment: 0       Pre-Treament Pain: 4/10  Pain at present: 4  Subjective: Patient reports \"its been swelling more. \"      Exercises/Modalities:  See DocFlow Sheet    Assessment  Assessment: Tolerated treatment session well. Reports increased pain and edema over the past two days. Discussed frequency of exercises as to not increase pain and edema. Patient verbalized understanding. Will continue to progress as patient tolerates. Post Treatment Pain: 4/10      Goals  Short Term Goals  Time Frame for Short term goals: 6 visits  Short term goal 1: Patient to be inependent with HEP  Short term goal 2: Patient to demonstrate ability to produce a full fist, touching all fingertips to [facility] in order to grasp small objects without dropping. Long Term Goals  Time Frame for Long term goals : 18 visits  Long term goal 1: Patient to increase forearm pronation/supination to 85 degrees or more in order to carry objects with B/L arms. Long term goal 2: Patient to increase R wrist flexion/extension to 45 degrees or more in order to play instruments independently. Long term goal 3: Edema Reduction of R wrist circumference to 15.0 cm or less in order to allow for improved ROM and lower pain levels. Long term goal 4: Thumb IP flexion to 55 degrees or more, thumb MCP flexion to 55 degrees or more in order to produce a tight fist to grasp objects and hold guitar pick independently.              Time In:

## 2021-03-19 ENCOUNTER — HOSPITAL ENCOUNTER (OUTPATIENT)
Dept: GENERAL RADIOLOGY | Age: 51
Discharge: HOME | End: 2021-03-21
Payer: COMMERCIAL

## 2021-03-19 ENCOUNTER — HOSPITAL ENCOUNTER (OUTPATIENT)
Dept: OCCUPATIONAL THERAPY | Age: 51
Setting detail: THERAPIES SERIES
Discharge: HOME | End: 2021-03-19
Payer: COMMERCIAL

## 2021-03-19 ENCOUNTER — HOSPITAL ENCOUNTER (OUTPATIENT)
Age: 51
Discharge: HOME | End: 2021-03-21
Payer: COMMERCIAL

## 2021-03-19 DIAGNOSIS — S52.501A UNSPECIFIED FRACTURE OF THE LOWER END OF RIGHT RADIUS, INITIAL ENCOUNTER FOR CLOSED FRACTURE: ICD-10-CM

## 2021-03-19 PROCEDURE — 97530 THERAPEUTIC ACTIVITIES: CPT

## 2021-03-19 PROCEDURE — 97140 MANUAL THERAPY 1/> REGIONS: CPT

## 2021-03-19 PROCEDURE — 73100 X-RAY EXAM OF WRIST: CPT

## 2021-03-19 NOTE — PROGRESS NOTES
Phone: Shonna Shah    Fax: [phone]                       Outpatient Occupational Therapy                                                                            Daily Note  Date: 3/19/2021   MRN: [id number]    ACCT#: [de-identified]  Patient: Pelon Baldwin  : 1970  Referring Practitioner: Dr. Calin Beyer    Diagnosis: R wrist fracture     Onset Date: 21  Total # of Visits Approved: 12 Per Physician Order  Total # of Visits to Date: 4  No Show: 0  Canceled Appointment: 0       Pre-Treament Pain: 5/10  Pain at present: 5  Subjective: Pt reports \"it's aching. \"      Exercises/Modalities:  See DocFlow Sheet    Assessment  Assessment: Pt reports attending doctor appointment this morning. Reports CNP was concerned with the stiffness and swelling and placed her on an anti inflammatory. Discussed adding heat at home prior to completing HEP. Continued with current plan. Pt was able to produce full fist this date without assistance and prior to finger PROM. Fair tolerance this session due to pain. Pt reports she did not take any pain medication this date, states \"I just want to see how I do without it. \" Will continue to progress as pt tolerates. Prognosis: Good       Post Treatment Pain: 6/10      Goals  Short Term Goals  Time Frame for Short term goals: 6 visits  Short term goal 1: Patient to be inependent with HEP  Short term goal 2: Patient to demonstrate ability to produce a full fist, touching all fingertips to [facility] in order to grasp small objects without dropping. Long Term Goals  Time Frame for Long term goals : 18 visits  Long term goal 1: Patient to increase forearm pronation/supination to 85 degrees or more in order to carry objects with B/L arms. Long term goal 2: Patient to increase R wrist flexion/extension to 45 degrees or more in order to play instruments independently.   Long term goal 3: Edema Reduction of R wrist circumference to 15.0 cm or less in order to allow for improved ROM and lower pain levels. Long term goal 4: Thumb IP flexion to 55 degrees or more, thumb MCP flexion to 55 degrees or more in order to produce a tight fist to grasp objects and hold guitar pick independently.              Time In: 1520  Time Out: 1605  Timed Coded Minutes: 40  Total Treatment Time: Sanaz 29, SEARS/L    Date: 3/19/2021

## 2021-03-22 ENCOUNTER — HOSPITAL ENCOUNTER (OUTPATIENT)
Dept: OCCUPATIONAL THERAPY | Age: 51
Setting detail: THERAPIES SERIES
Discharge: HOME | End: 2021-03-22
Payer: COMMERCIAL

## 2021-03-22 PROCEDURE — 97140 MANUAL THERAPY 1/> REGIONS: CPT

## 2021-03-22 PROCEDURE — 97110 THERAPEUTIC EXERCISES: CPT

## 2021-03-22 NOTE — PROGRESS NOTES
Phone: Shonna Shah    Fax: [phone]                       Outpatient Occupational Therapy                                                                            Daily Note  Date: 3/22/2021   MRN: [id number]    ACCT#: [de-identified]  Patient: Volodymyr Mednez  : 1970  Referring Practitioner: Dr. Bipin Reeder    Diagnosis: R wrist fracture     Onset Date: 21  Total # of Visits Approved: 12 Per Physician Order  Total # of Visits to Date: 5  No Show: 0  Canceled Appointment: 0       Pre-Treament Pain: 6/10  Pain at present: 6  Subjective: Patient reports \"im not taking the pain medication anymore. \"    Exercises/Modalities:  See DocFlow Sheet    Assessment  Assessment: Tolerated treatment session well on this date. Reported she is  no longer taking pain medication. Reports seeing her surgeon the previous week. States she is now able to remove the splint throughout the day and to continue a 5# weight restriction. Patient states \" I am using the arm as much as I can. \"  Noted improved ROM  of the forearm as well as wrist and fingers. Will continue to progress as patient tolerates. Post Treatment Pain: 6/10      Goals  Short Term Goals  Time Frame for Short term goals: 6 visits  Short term goal 1: Patient to be inependent with HEP- MET  Short term goal 2: Patient to demonstrate ability to produce a full fist, touching all fingertips to [facility] in order to grasp small objects without dropping. Long Term Goals  Time Frame for Long term goals : 18 visits  Long term goal 1: Patient to increase forearm pronation/supination to 85 degrees or more in order to carry objects with B/L arms. Long term goal 2: Patient to increase R wrist flexion/extension to 45 degrees or more in order to play instruments independently. Long term goal 3: Edema Reduction of R wrist circumference to 15.0 cm or less in order to allow for improved ROM and lower pain levels.   Long

## 2021-03-24 ENCOUNTER — HOSPITAL ENCOUNTER (OUTPATIENT)
Dept: OCCUPATIONAL THERAPY | Age: 51
Setting detail: THERAPIES SERIES
Discharge: HOME | End: 2021-03-24
Payer: COMMERCIAL

## 2021-03-24 PROCEDURE — 97110 THERAPEUTIC EXERCISES: CPT

## 2021-03-24 PROCEDURE — 97140 MANUAL THERAPY 1/> REGIONS: CPT

## 2021-03-24 PROCEDURE — 97530 THERAPEUTIC ACTIVITIES: CPT

## 2021-03-24 NOTE — PROGRESS NOTES
Phone: [address]    Fax: [phone]                       Outpatient Occupational Therapy                                                                            Daily Note  Date: 3/24/2021   MRN: [id number]    ACCT#: [de-identified]  Patient: Marvin Sebastian  : 1970  Referring Practitioner: Dr. Chrissie Sicard    Diagnosis: R wrist fracture     Onset Date: 21  Total # of Visits Approved: 12 Per Physician Order  Total # of Visits to Date: 6  No Show: 0  Canceled Appointment: 0       Pre-Treament Pain: 2/10  Pain at present: 2       Objective  RUE AROM (degrees)  R Forearm Pron 0-90: 0-80  R Forearm Supination  0-90: 0-85  R Wrist Flexion 0-80: 0-25  R Wrist Extension 0-70: 0-35  R Wrist Radial Deviation 0-20: 0-15  R Wrist Ulnar Deviation 0-45: 0-25  Right Hand AROM (degrees)  Right Hand General AROM: Can touch all fingertips to [facility]  R Thumb MCP 0-50: 0-60  R Thumb IP 0-80: 0-60         RUE Edema - Circumference (cm)  Wrist Crease: 15.5 cm  Metacarpals: 17.3 cm    Exercises/Modalities:  See DocFlow Sheet    Assessment  Assessment: Tolerated treatment session well. Reassessed all goals, noted improvements in wrist, forearm, and digit AROM. Noted a reduction in edema across the wrist and hand as well. Patient reports she is no longer wearing the brace overnight. Issued a pink sponge for  strength. Will continue to progress as patient tolerates.              Post Treatment Pain: 2/10      Goals  Short Term Goals  Time Frame for Short term goals: 6 visits  Short term goal 1: Patient to be inependent with HEP- MET  Short term goal 2: Patient to demonstrate ability to produce a full fist, touching all fingertips to [facility] in order to grasp small objects without dropping.- met           Long Term Goals  Time Frame for Long term goals : 18 visits  Long term goal 1: Patient to increase forearm pronation/supination to 85 degrees or more in order to carry objects with B/L

## 2021-03-26 ENCOUNTER — HOSPITAL ENCOUNTER (OUTPATIENT)
Dept: OCCUPATIONAL THERAPY | Age: 51
Setting detail: THERAPIES SERIES
Discharge: HOME | End: 2021-03-26
Payer: COMMERCIAL

## 2021-03-26 PROCEDURE — 97140 MANUAL THERAPY 1/> REGIONS: CPT

## 2021-03-26 PROCEDURE — 97110 THERAPEUTIC EXERCISES: CPT

## 2021-03-26 NOTE — PROGRESS NOTES
more, thumb MCP flexion to 55 degrees or more in order to produce a tight fist to grasp objects and hold guitar pick independently. - MET             Time In: 0830  Time Out: 1640  Timed Coded Minutes: 30  Total Treatment Time: Edenilson Ross 13   OTR/L Date: 3/26/2021

## 2021-03-30 ENCOUNTER — HOSPITAL ENCOUNTER (OUTPATIENT)
Dept: ULTRASOUND IMAGING | Age: 51
Discharge: HOME | End: 2021-04-01
Payer: COMMERCIAL

## 2021-03-30 ENCOUNTER — HOSPITAL ENCOUNTER (OUTPATIENT)
Dept: MAMMOGRAPHY | Age: 51
Discharge: HOME | End: 2021-04-01
Payer: COMMERCIAL

## 2021-03-30 DIAGNOSIS — D24.2 BREAST FIBROADENOMA IN FEMALE, LEFT: ICD-10-CM

## 2021-03-30 PROCEDURE — G0279 TOMOSYNTHESIS, MAMMO: HCPCS

## 2021-03-30 PROCEDURE — 76641 ULTRASOUND BREAST COMPLETE: CPT

## 2021-04-02 ENCOUNTER — OFFICE VISIT (OUTPATIENT)
Dept: PRIMARY CARE CLINIC | Age: 51
End: 2021-04-02
Payer: COMMERCIAL

## 2021-04-02 ENCOUNTER — HOSPITAL ENCOUNTER (OUTPATIENT)
Dept: OCCUPATIONAL THERAPY | Age: 51
Setting detail: THERAPIES SERIES
Discharge: HOME | End: 2021-04-02
Payer: COMMERCIAL

## 2021-04-02 VITALS
WEIGHT: 147.9 LBS | DIASTOLIC BLOOD PRESSURE: 87 MMHG | HEART RATE: 79 BPM | BODY MASS INDEX: 23.77 KG/M2 | SYSTOLIC BLOOD PRESSURE: 125 MMHG | TEMPERATURE: 98.3 F | HEIGHT: 66 IN | OXYGEN SATURATION: 99 %

## 2021-04-02 DIAGNOSIS — K08.89 PAIN, DENTAL: Primary | ICD-10-CM

## 2021-04-02 PROBLEM — F17.200 CURRENT SMOKER: Status: ACTIVE | Noted: 2021-04-02

## 2021-04-02 PROCEDURE — 97110 THERAPEUTIC EXERCISES: CPT

## 2021-04-02 PROCEDURE — 97140 MANUAL THERAPY 1/> REGIONS: CPT

## 2021-04-02 PROCEDURE — 99213 OFFICE O/P EST LOW 20 MIN: CPT | Performed by: NURSE PRACTITIONER

## 2021-04-02 RX ORDER — AMOXICILLIN AND CLAVULANATE POTASSIUM 875; 125 MG/1; MG/1
1 TABLET, FILM COATED ORAL 2 TIMES DAILY
Qty: 20 TABLET | Refills: 0 | Status: SHIPPED | OUTPATIENT
Start: 2021-04-02 | End: 2021-04-12

## 2021-04-02 RX ORDER — COVID-19 ANTIGEN TEST
220 KIT MISCELLANEOUS
COMMUNITY
Start: 2020-12-30

## 2021-04-02 ASSESSMENT — ENCOUNTER SYMPTOMS
VOMITING: 0
TROUBLE SWALLOWING: 0
WHEEZING: 0
COUGH: 0
NAUSEA: 0
SHORTNESS OF BREATH: 0
SINUS PRESSURE: 0
DIARRHEA: 0
RHINORRHEA: 0

## 2021-04-02 NOTE — PROGRESS NOTES
[address] [place] [address]  Dept: [phone]  Dept Fax: [phone]     Shayna Hook is a 48 y.o. female who presents to the [facility] today for hermedical conditions/complaints as noted below. Shayna Hook is c/o of Facial Swelling (Patient presents today with left side of face swollen with pain, Pt thinks its possible abscess, Pt states started yesterday after returning from a dental consult )      HPI:     Dental Pain   This is a new problem. The current episode started yesterday (Started yesterday with pain and swelling to left side of face and left tooth after dental consult for implants. Had x-rays done and found decay in crown of tooth that is now painful. Denies any bleeding or drainage. Unable to remove partial.). The problem occurs constantly (Constant throbbing pain to left upper tooth, #3). The problem has been gradually worsening. The pain is at a severity of 6/10. The pain is moderate. Associated symptoms include facial pain. Pertinent negatives include no difficulty swallowing, fever, oral bleeding, sinus pressure or thermal sensitivity. Associated symptoms comments: Swelling and pain to left side of face up under her left eye and down to left jaw. Difficulty opening mouth due to pain and swelling. . Treatments tried: Keflex 250 mg x 2 last night and this AM left over from previous prescription. The treatment provided no relief.           Past Medical History:   Diagnosis Date    Anxiety         Current Outpatient Medications   Medication Sig Dispense Refill    Naproxen Sodium 220 MG CAPS Take 220 mg by mouth      amoxicillin-clavulanate (AUGMENTIN) 875-125 MG per tablet Take 1 tablet by mouth 2 times daily for 10 days 20 tablet 0    clonazePAM (KLONOPIN) 0.5 MG tablet TAKE 1 TO 2 TABLETS BY MOUTH EVERY DAY AS NEEDED FOR ANXIETY 60 tablet 2    LORazepam (ATIVAN) 1 MG tablet TAKE 1 TABLET BY MOUTH EVERY 8 HOURS AS NEEDED FOR ANXIETY 60 tablet 2    oxyCODONE-acetaminophen (PERCOCET) 5-325 MG per tablet Take 1 tablet by mouth every 4 hours as needed for Pain.  meloxicam (MOBIC) 15 MG tablet Take 15 mg by mouth daily      pimecrolimus (ELIDEL) 1 % cream Apply topically 2 times daily. 60 g 1    PARoxetine (PAXIL) 10 MG tablet Take 1 tablet by mouth daily 90 tablet 1    QUEtiapine (SEROQUEL) 50 MG tablet Take 1.5 tablets by mouth nightly 135 tablet 1    ibuprofen (ADVIL;MOTRIN) 600 MG tablet Take 1 tablet by mouth 4 times daily as needed for Pain 40 tablet 0    Cholecalciferol (VITAMIN D) 50 MCG (2000 UT) CAPS capsule Take 2,000 Units by mouth daily      nicotine (NICODERM CQ) 14 MG/24HR Place 1 patch onto the skin daily for 14 days 14 patch 5    nicotine (NICODERM CQ) 7 MG/24HR Place 1 patch onto the skin daily for 14 days 14 patch 0     No current facility-administered medications for this visit. Allergies   Allergen Reactions    Codeine Anaphylaxis    Effexor [Venlafaxine] Other (See Comments)     Slurred speech, disoriented, tripping    Xanax [Alprazolam] Other (See Comments)     Night terrors       Subjective:     Review of Systems   Constitutional: Negative for appetite change, chills, diaphoresis, fatigue and fever. HENT: Positive for dental problem (Pain to left upper tooth and swelling to left side of face. ). Negative for congestion, ear pain, mouth sores, rhinorrhea, sinus pressure and trouble swallowing. Respiratory: Negative for cough, shortness of breath and wheezing. Gastrointestinal: Negative for diarrhea, nausea and vomiting. Skin: Negative for rash and wound. Neurological: Negative for dizziness, light-headedness and headaches. Objective:      Physical Exam  Vitals signs and nursing note reviewed. Constitutional:       General: She is not in acute distress. Appearance: Normal appearance. She is well-developed. She is not ill-appearing or diaphoretic. Comments: Well hydrated, nontoxic appearance. HENT:      Head: Normocephalic and atraumatic. Right Ear: Hearing and external ear normal.      Left Ear: Hearing and external ear normal.      Nose: Nose normal.      Right Sinus: No maxillary sinus tenderness or frontal sinus tenderness. Left Sinus: No maxillary sinus tenderness or frontal sinus tenderness. Mouth/Throat:      Lips: Pink. Mouth: Mucous membranes are moist.      Dentition: Abnormal dentition. Has dentures (Partial plate). Dental tenderness and gingival swelling (Swelling around left upper tooth #3.) present. Pharynx: Oropharynx is clear. Uvula midline. No oropharyngeal exudate or posterior oropharyngeal erythema. Eyes:      Conjunctiva/sclera: Conjunctivae normal.      Pupils: Pupils are equal, round, and reactive to light. Cardiovascular:      Rate and Rhythm: Normal rate and regular rhythm. Heart sounds: Normal heart sounds, S1 normal and S2 normal. No murmur. No friction rub. No gallop. Pulmonary:      Effort: Pulmonary effort is normal. No accessory muscle usage or respiratory distress. Breath sounds: Normal breath sounds and air entry. No decreased breath sounds, wheezing, rhonchi or rales. Comments: No cough. Breath sounds clear B/L anterior and posterior lobes. Chest expansion symmetrical.  No audible wheezing or respiratory distress. No rales or rhonchi. Musculoskeletal: Normal range of motion. Lymphadenopathy:      Cervical: No cervical adenopathy. Right cervical: No superficial or posterior cervical adenopathy. Left cervical: No superficial or posterior cervical adenopathy. Skin:     General: Skin is warm and dry. Coloration: Skin is not pale. Findings: No erythema or rash. Neurological:      Mental Status: She is alert and oriented to person, place, and time. Psychiatric:         Behavior: Behavior normal. Behavior is cooperative.        BP 125/87 (Site: Right Upper Arm, Position: Sitting, Cuff Size: Medium Adult)   Pulse 79   Temp 98.3 °F (36.8 °C) (Oral)   Ht 5' 6\" (1.676 m)   Wt 147 lb 14.4 oz (67.1 kg)   LMP 04/05/2014   SpO2 99%   BMI 23.87 kg/m²     Assessment:      Diagnosis Orders   1. Pain, dental  amoxicillin-clavulanate (AUGMENTIN) 875-125 MG per tablet       Plan:      Return if symptoms worsen or fail to improve, for Resume all previous medications as directed. Orders Placed This Encounter   Medications    amoxicillin-clavulanate (AUGMENTIN) 875-125 MG per tablet     Sig: Take 1 tablet by mouth 2 times daily for 10 days     Dispense:  20 tablet     Refill:  0      · Augmentin 875/125, one tablet, twice a day for 10 days. · Continue antibiotic until all doses are completed, take with food to lessen nausea and GI side effects. · Probiotic or greek yogurt daily while on antibiotics  · Aleve/Ibuprofen/Tylenol OTC as directed on the package as needed for pain, discomfort or fever. Take with food. · Warm salt water rinses as needed to relieve discomfort/pain  · Recommend smoking cessation  · Instructed on proper oral hygiene with soft bristled toothbrush, manual or powered  · Avoid hot or cold foods that may affect sensitive teeth/gums  · Eat on other side of mouth to avoid discomfort with pressure on tooth  · Patient information given on dental pain and augmentin. · Follow up with dentist within 1 to 2 days, if no dentist, given pamphlet for area dentists. · To ER for sudden increase in pain, difficulty swallowing, difficulty breathing, hives, rash or inability to open mouth fully. Alberta received counseling on the following healthy behaviors: medication adherence. Patient given educational materials - see patient instructions. Discussed use,benefit, and side effects of prescribed medications. Treatment plan discussed at visit. Continue routine health care follow up. All patient questions answered. Pt voiced understanding.       Electronically signed by DORA Tinajero CNP on 4/2/2021 at 3:28 PM

## 2021-04-02 NOTE — PROGRESS NOTES
Phone: [address]    Fax: [phone]                       Outpatient Occupational Therapy                                                                            Daily Note  Date: 2021   MRN: [id number]    ACCT#: [de-identified]  Patient: Danuta Aguilera  : 1970  Referring Practitioner: Dr. Terry Nichols    Diagnosis: R wrist fracture     Onset Date: 21  Total # of Visits Approved: 12 Per Physician Order  Total # of Visits to Date: 8  No Show: 0  Canceled Appointment: 0       Pre-Treament Pain: 0/10          Exercises/Modalities:  See DocFlow Sheet    Assessment  Assessment: Upon arrival patient report she is struggling with making it to therapy appointments and fulfilling work demands. Therapist and patient decide to reduce treatment sessions to 1x per week and will increase as needed. Patient reports she is performing exercises at home without difficulty. Tolerated session well. Noted improvements in AROM of the wrist.  Will reassess all goals next session for upcoming doctor appointment. Post Treatment Pain: 0/10      Goals  Short Term Goals  Time Frame for Short term goals: 6 visits  Short term goal 1: Patient to be inependent with HEP- MET  Short term goal 2: Patient to demonstrate ability to produce a full fist, touching all fingertips to [facility] in order to grasp small objects without dropping.- met           Long Term Goals  Time Frame for Long term goals : 18 visits  Long term goal 1: Patient to increase forearm pronation/supination to 85 degrees or more in order to carry objects with B/L arms. Long term goal 2: Patient to increase R wrist flexion/extension to 45 degrees or more in order to play instruments independently. Long term goal 3: Edema Reduction of R wrist circumference to 15.0 cm or less in order to allow for improved ROM and lower pain levels.   Long term goal 4: Thumb IP flexion to 55 degrees or more, thumb MCP flexion to 55 degrees or more in order to produce a tight fist to grasp objects and hold guitar pick independently. - MET             Time In: 0830  Time Out: 0910  Timed Coded Minutes: 40  Total Treatment Time: [facility]   OTR/L Date: 4/2/2021

## 2021-04-02 NOTE — PATIENT INSTRUCTIONS
should know about amoxicillin and clavulanate potassium? You should not use this medicine if you have severe kidney disease, if you have had liver problems or jaundice while taking amoxicillin and clavulanate potassium, or if you are allergic to any penicillin or cephalosporin antibiotic, such as Amoxil, Ceftin, Cefzil, Moxatag, Omnicef, and others. What is amoxicillin and clavulanate potassium? Amoxicillin is a penicillin antibiotic. Clavulanate potassium helps prevent certain bacteria from becoming resistant to amoxicillin. Amoxicillin and clavulanate potassium is a combination medicine used to treat many different infections caused by bacteria, such as sinusitis, pneumonia, ear infections, bronchitis, urinary tract infections, and infections of the skin. Amoxicillin and clavulanate potassium may also be used for purposes not listed in this medication guide. What should I discuss with my healthcare provider before taking amoxicillin and clavulanate potassium? You should not use this medicine if you are allergic to it, or if:  · you have severe kidney disease (or if you are on dialysis);  · you have had liver problems or jaundice while taking amoxicillin and clavulanate potassium; or  · you are allergic to any penicillin or cephalosporin antibiotic, such as Amoxil, Ceftin, Cefzil, Moxatag, Omnicef, and others. Tell your doctor if you have ever had:  · liver disease (hepatitis or jaundice);  · kidney disease; or  · mononucleosis. The liquid or chewable tablet may contain phenylalanine. Tell your doctor if you have phenylketonuria (PKU). Tell your doctor if you are pregnant or breastfeeding. Amoxicillin and clavulanate potassium can make birth control pills less effective. Ask your doctor about using a non-hormonal birth control (condom, diaphragm, cervical cap, or contraceptive sponge) to prevent pregnancy. Do not give this medicine to a child without medical advice.   How should I take amoxicillin and medicines can cause diarrhea, which may be a sign of a new infection. If you have diarrhea that is watery or bloody, call your doctor before using anti-diarrhea medicine. What are the possible side effects of amoxicillin and clavulanate potassium? Get emergency medical help if you have signs of an allergic reaction (hives, difficult breathing, swelling in your face or throat) or a severe skin reaction (fever, sore throat, burning eyes, skin pain, red or purple skin rash with blistering and peeling). Call your doctor at once if you have:  · severe stomach pain, diarrhea that is watery or bloody (even if it occurs months after your last dose);  · pale or yellowed skin, dark colored urine, fever, confusion or weakness;  · loss of appetite, upper stomach pain;  · little or no urination; or  · easy bruising or bleeding. Common side effects may include:  · nausea, vomiting; diarrhea;  · rash, itching;  · vaginal itching or discharge; or  · diaper rash. This is not a complete list of side effects and others may occur. Call your doctor for medical advice about side effects. You may report side effects to FDA at 8-214-FDA-7985. What other drugs will affect amoxicillin and clavulanate potassium? Tell your doctor about all your other medicines, especially:  · allopurinol;  · probenecid; or  · a blood thinner --warfarin, Coumadin, Jantoven. This list is not complete. Other drugs may affect amoxicillin and clavulanate potassium, including prescription and over-the-counter medicines, vitamins, and herbal products. Not all possible drug interactions are listed here. Where can I get more information? Your pharmacist can provide more information about amoxicillin and clavulanate potassium. Remember, keep this and all other medicines out of the reach of children, never share your medicines with others, and use this medication only for the indication prescribed.    Every effort has been made to ensure that the information provided by [address] [place], [place] [address]can [address] is accurate, up-to-date, and complete, but no guarantee is made to that effect. Drug information contained herein may be time sensitive. [facility] information has been compiled for use by healthcare practitioners and consumers in the [place] and therefore [facility] does not warrant that uses outside of the [place] are appropriate, unless specifically indicated otherwise. [facility]'s drug information does not endorse drugs, diagnose patients or recommend therapy. [facility] drug information is an informational resource designed to assist licensed healthcare practitioners in caring for their patients and/or to serve consumers viewing this service as a supplement to, and not a substitute for, the expertise, skill, knowledge and judgment of healthcare practitioners. The absence of a warning for a given drug or drug combination in no way should be construed to indicate that the drug or drug combination is safe, effective or appropriate for any given patient. [facility] does not assume any responsibility for any aspect of healthcare administered with the aid of information [facility] provides. The information contained herein is not intended to cover all possible uses, directions, precautions, warnings, drug interactions, allergic reactions, or adverse effects. If you have questions about the drugs you are taking, check with your doctor, nurse or pharmacist.  Copyright 3164-0603 [address]: 12.01. Revision date: 2/24/2020. Care instructions adapted under license by [facility] ([facility]). If you have questions about a medical condition or this instruction, always ask your healthcare professional. [place] [address] any warranty or liability for your use of this information. · Augmentin 875/125, one tablet, twice a day for 10 days. · Continue antibiotic until all doses are completed, take with food to lessen nausea and GI side effects.    · Probiotic or greek yogurt daily while on antibiotics  · Aleve/Ibuprofen/Tylenol OTC as directed on the package as needed for pain, discomfort or fever. Take with food. · Warm salt water rinses as needed to relieve discomfort/pain  · Recommend smoking cessation  · Instructed on proper oral hygiene with soft bristled toothbrush, manual or powered  · Avoid hot or cold foods that may affect sensitive teeth/gums  · Eat on other side of mouth to avoid discomfort with pressure on tooth  · Patient information given on dental pain and augmentin. · Follow up with dentist within 1 to 2 days, if no dentist, given pamphlet for area dentists. · To ER for sudden increase in pain, difficulty swallowing, difficulty breathing, hives, rash or inability to open mouth fully.

## 2021-04-05 ENCOUNTER — APPOINTMENT (OUTPATIENT)
Dept: OCCUPATIONAL THERAPY | Age: 51
End: 2021-04-05
Payer: COMMERCIAL

## 2021-04-07 ENCOUNTER — APPOINTMENT (OUTPATIENT)
Dept: OCCUPATIONAL THERAPY | Age: 51
End: 2021-04-07
Payer: COMMERCIAL

## 2021-04-08 ENCOUNTER — TELEPHONE (OUTPATIENT)
Dept: FAMILY MEDICINE CLINIC | Age: 51
End: 2021-04-08

## 2021-04-08 NOTE — TELEPHONE ENCOUNTER
The Augmentin actually covers more bacteria than penicillin does. Recommend giving it more time and also using ibuprofen.

## 2021-04-08 NOTE — TELEPHONE ENCOUNTER
Phoned pt advised of the below she verbalized understanding. Pt states she doesn't have an actual dentist I provider her with recommendations to Lance Mcgarry in [place].

## 2021-04-08 NOTE — TELEPHONE ENCOUNTER
Darrian Berry went to the walk in on 4/2 for an abscess tooth. She was put on Augmentin. She said she has been taking it BID and the swelling and inflammation has gone down some but said its not fully working. She would like to know if Dr. Wing Campo could call in a different antibiotic like a penicillin to help with the infection. Please let Alberta know.     DM-carly    Health Maintenance   Topic Date Due    Hepatitis C screen  Never done    Pneumococcal 0-64 years Vaccine (1 of 1 - PPSV23) Never done    HIV screen  Never done    Cervical cancer screen  Never done    Shingles Vaccine (1 of 2) Never done    Colon cancer screen colonoscopy  Never done    COVID-19 Vaccine (2 - Pfizer 2-dose series) 01/27/2021    Flu vaccine (Season Ended) 09/01/2021    Breast cancer screen  03/30/2023    Lipid screen  08/17/2025    DTaP/Tdap/Td vaccine (2 - Td) 10/07/2030    Hepatitis A vaccine  Aged Out    Hepatitis B vaccine  Aged Out    Hib vaccine  Aged Out    Meningococcal (ACWY) vaccine  Aged Out             (applicable per patient's age: Cancer Screenings, Depression Screening, Fall Risk Screening, Immunizations)    LDL Cholesterol (mg/dL)   Date Value   08/17/2020 151 (H)     AST (U/L)   Date Value   08/17/2020 18     ALT (U/L)   Date Value   08/17/2020 11     BUN (mg/dL)   Date Value   02/03/2021 8      (goal A1C is < 7)   (goal LDL is <100) need 30-50% reduction from baseline     BP Readings from Last 3 Encounters:   04/02/21 125/87   02/05/21 (!) 155/106   02/05/21 (!) 90/58    (goal /80)      All Future Testing planned in [facility]:  Lab Frequency Next Occurrence   COVID-19 Ambulatory Once 12/30/2020   MRI SHOULDER LEFT WO CONTRAST Once 01/09/2021       Next Visit Date:  Future Appointments   Date Time Provider Dima Elaine   4/9/2021  8:15 AM Nena Osorio Heads [facility] [place]            Patient Active Problem List:     OCD (obsessive compulsive disorder)     Anxiety     Fatigue     Primary fibromyalgia

## 2021-04-09 ENCOUNTER — HOSPITAL ENCOUNTER (OUTPATIENT)
Dept: OCCUPATIONAL THERAPY | Age: 51
Setting detail: THERAPIES SERIES
Discharge: HOME | End: 2021-04-09
Payer: COMMERCIAL

## 2021-04-09 PROCEDURE — 97530 THERAPEUTIC ACTIVITIES: CPT

## 2021-04-09 PROCEDURE — 97140 MANUAL THERAPY 1/> REGIONS: CPT

## 2021-04-09 NOTE — PROGRESS NOTES
goals : 18 visits  Long term goal 1: Patient to increase forearm pronation/supination to 85 degrees or more in order to carry objects with B/L arms. - MET  Long term goal 2: Patient to increase R wrist flexion/extension to 45 degrees or more in order to play instruments independently. - NOT MET  Long term goal 3: Edema Reduction of R wrist circumference to 15.0 cm or less in order to allow for improved ROM and lower pain levels. - NOT MET  Long term goal 4: Thumb IP flexion to 55 degrees or more, thumb MCP flexion to 55 degrees or more in order to produce a tight fist to grasp objects and hold guitar pick independently. - MET             Time In: 0830  Time Out: 8484  Timed Coded Minutes: 30  Total Treatment Time: [address], SEARS/L    Date: 4/9/2021

## 2021-04-16 ENCOUNTER — HOSPITAL ENCOUNTER (OUTPATIENT)
Age: 51
Discharge: HOME | End: 2021-04-18
Payer: COMMERCIAL

## 2021-04-16 ENCOUNTER — HOSPITAL ENCOUNTER (OUTPATIENT)
Dept: OCCUPATIONAL THERAPY | Age: 51
Setting detail: THERAPIES SERIES
Discharge: HOME | End: 2021-04-16
Payer: COMMERCIAL

## 2021-04-16 ENCOUNTER — HOSPITAL ENCOUNTER (OUTPATIENT)
Dept: GENERAL RADIOLOGY | Age: 51
Discharge: HOME | End: 2021-04-18
Payer: COMMERCIAL

## 2021-04-16 DIAGNOSIS — S52.501D CLOSED FRACTURE OF DISTAL END OF RIGHT RADIUS WITH ROUTINE HEALING, UNSPECIFIED FRACTURE MORPHOLOGY, SUBSEQUENT ENCOUNTER: ICD-10-CM

## 2021-04-16 PROCEDURE — 73100 X-RAY EXAM OF WRIST: CPT

## 2021-04-16 PROCEDURE — 97110 THERAPEUTIC EXERCISES: CPT

## 2021-04-16 PROCEDURE — 97140 MANUAL THERAPY 1/> REGIONS: CPT

## 2021-04-16 NOTE — PROGRESS NOTES
Phone: [phone]    Fax: [phone]                       Outpatient Occupational Therapy                                                                            Daily Note  Date: 2021   MRN: [id number]    ACCT#: [de-identified]  Patient: Aarti Salter  : 1970  Referring Practitioner: Dr. Arleen Randall    Diagnosis: R wrist fracture     Onset Date: 21  Total # of Visits Approved: 12 Per Physician Order  Total # of Visits to Date: 10  No Show: 0  Canceled Appointment: 0       Pre-Treament Pain: 0/10  Pain at present: 0  Subjective: Pt reports pain at the end of the work day. Exercises/Modalities:  See DocFlow Sheet    Assessment  Assessment: Fair tolerance of session. Pt continues to c/o pain over incision and states she thinks the \"knot\" has gotten bigger since last week. Noted increased edema across wrist this date. Educated pt on peach putty program this session for continued strengthening. Instructed pt to complete to her tolerance d/t continued pain across incision. Ended session early as pt has follow up appt with doctor this morning at 9:00am. Will continue to progress as able. Prognosis: Good       Post Treatment Pain: 4/10      Goals  Short Term Goals  Time Frame for Short term goals: 6 visits  Short term goal 1: Patient to be independent with HEP- MET  Short term goal 2: Patient to demonstrate ability to produce a full fist, touching all fingertips to [facility] in order to grasp small objects without dropping.- met           Long Term Goals  Time Frame for Long term goals : 18 visits  Long term goal 1: Patient to increase forearm pronation/supination to 85 degrees or more in order to carry objects with B/L arms.  - MET  Long term goal 2: Patient to increase R wrist flexion/extension to 45 degrees or more in order to play instruments independently. - NOT MET  Long term goal 3: Edema Reduction of R wrist circumference to 15.0 cm or less in order to allow for improved ROM and lower pain levels. - NOT MET  Long term goal 4: Thumb IP flexion to 55 degrees or more, thumb MCP flexion to 55 degrees or more in order to produce a tight fist to grasp objects and hold guitar pick independently. - MET             Time In: 0815  Time Out: 8127  Timed Coded Minutes: 30  Total Treatment Time: [address] 83, RENU/L    Date: 4/16/2021

## 2021-04-26 RX ORDER — QUETIAPINE FUMARATE 50 MG/1
TABLET, FILM COATED ORAL
Qty: 135 TABLET | Refills: 1 | Status: SHIPPED | OUTPATIENT
Start: 2021-04-26 | End: 2021-09-27

## 2021-04-26 NOTE — TELEPHONE ENCOUNTER
Last OV: 1/7/2021 ER follow up  Chest pain   Last RX:   Next scheduled apt: Visit date not found      Sure scripts request  RX pending

## 2021-05-04 DIAGNOSIS — F41.9 ANXIETY: ICD-10-CM

## 2021-05-04 RX ORDER — LORAZEPAM 1 MG/1
TABLET ORAL
Qty: 60 TABLET | Refills: 2 | Status: SHIPPED | OUTPATIENT
Start: 2021-05-04 | End: 2021-07-27 | Stop reason: SDUPTHER

## 2021-05-04 RX ORDER — CLONAZEPAM 0.5 MG/1
TABLET ORAL
Qty: 60 TABLET | Refills: 2 | Status: SHIPPED | OUTPATIENT
Start: 2021-05-04 | End: 2021-07-27 | Stop reason: SDUPTHER

## 2021-05-04 RX ORDER — PAROXETINE 10 MG/1
10 TABLET, FILM COATED ORAL DAILY
Qty: 90 TABLET | Refills: 1 | Status: SHIPPED | OUTPATIENT
Start: 2021-05-04 | End: 2021-09-27

## 2021-05-04 NOTE — TELEPHONE ENCOUNTER
Klonopin 0.5 mg  paxil 10 mg  Ativan 1 mg    DM-carly    This was requested by Miles Alberto on 4/2 and denied. [facility] sent a request for seroquel on 4/26 and it was approved. Can we send these in for her? In for an ED follow up on 1/7/21. No future appointments. Health Maintenance   Topic Date Due    Hepatitis C screen  Never done    Pneumococcal 0-64 years Vaccine (1 of 1 - PPSV23) Never done    HIV screen  Never done    Cervical cancer screen  Never done    Shingles Vaccine (1 of 2) Never done    Colon cancer screen colonoscopy  Never done    COVID-19 Vaccine (2 - Pfizer 2-dose series) 01/27/2021    Flu vaccine (Season Ended) 09/01/2021    Breast cancer screen  03/30/2023    Lipid screen  08/17/2025    DTaP/Tdap/Td vaccine (2 - Td) 10/07/2030    Hepatitis A vaccine  Aged Out    Hepatitis B vaccine  Aged Out    Hib vaccine  Aged Out    Meningococcal (ACWY) vaccine  Aged Out             (applicable per patient's age: Cancer Screenings, Depression Screening, Fall Risk Screening, Immunizations)    LDL Cholesterol (mg/dL)   Date Value   08/17/2020 151 (H)     AST (U/L)   Date Value   08/17/2020 18     ALT (U/L)   Date Value   08/17/2020 11     BUN (mg/dL)   Date Value   02/03/2021 8      (goal A1C is < 7)   (goal LDL is <100) need 30-50% reduction from baseline     BP Readings from Last 3 Encounters:   04/02/21 125/87   02/05/21 (!) 155/106   02/05/21 (!) 90/58    (goal /80)      All Future Testing planned in [facility]:  Lab Frequency Next Occurrence   COVID-19 Ambulatory Once 12/30/2020   MRI SHOULDER LEFT WO CONTRAST Once 01/09/2021       Next Visit Date:  No future appointments.          Patient Active Problem List:     OCD (obsessive compulsive disorder)     Anxiety     Fatigue     Primary fibromyalgia syndrome     Current smoker

## 2021-05-04 NOTE — TELEPHONE ENCOUNTER
I usually refuse controlled substance Rx request if they are too early. That is what happened beginning of April. Prescriptions sent. OARRS reviewed.

## 2021-07-27 DIAGNOSIS — F41.9 ANXIETY: ICD-10-CM

## 2021-07-27 RX ORDER — LORAZEPAM 1 MG/1
TABLET ORAL
Qty: 60 TABLET | Refills: 2 | Status: SHIPPED | OUTPATIENT
Start: 2021-07-27 | End: 2021-10-22

## 2021-07-27 RX ORDER — CLONAZEPAM 0.5 MG/1
TABLET ORAL
Qty: 60 TABLET | Refills: 2 | Status: SHIPPED | OUTPATIENT
Start: 2021-07-27 | End: 2021-10-22

## 2021-07-27 NOTE — TELEPHONE ENCOUNTER
Klonopin 0.5 mg  Ativan 1 mg    Dm-carly    Check up scheduled for 8/20/21 with .     Health Maintenance   Topic Date Due    Hepatitis C screen  Never done    Pneumococcal 0-64 years Vaccine (1 of 4 - PCV13) Never done    HIV screen  Never done    Cervical cancer screen  Never done    Colon cancer screen colonoscopy  Never done    Shingles Vaccine (1 of 2) Never done    COVID-19 Vaccine (2 - Pfizer 2-dose series) 01/27/2021    Flu vaccine (1) 09/01/2021    Breast cancer screen  03/30/2023    Lipid screen  08/17/2025    DTaP/Tdap/Td vaccine (2 - Td or Tdap) 10/07/2030    Hepatitis A vaccine  Aged Out    Hepatitis B vaccine  Aged Out    Hib vaccine  Aged Out    Meningococcal (ACWY) vaccine  Aged Out             (applicable per patient's age: Cancer Screenings, Depression Screening, Fall Risk Screening, Immunizations)    LDL Cholesterol (mg/dL)   Date Value   08/17/2020 151 (H)     AST (U/L)   Date Value   08/17/2020 18     ALT (U/L)   Date Value   08/17/2020 11     BUN (mg/dL)   Date Value   02/03/2021 8      (goal A1C is < 7)   (goal LDL is <100) need 30-50% reduction from baseline     BP Readings from Last 3 Encounters:   04/02/21 125/87   02/05/21 (!) 155/106   02/05/21 (!) 90/58    (goal /80)      All Future Testing planned in [facility]:  Lab Frequency Next Occurrence   COVID-19 Ambulatory Once 12/30/2020   MRI SHOULDER LEFT WO CONTRAST Once 01/09/2021       Next Visit Date:  Future Appointments   Date Time Provider Dima Elaine   8/20/2021  2:20 PM DO Mena Caballero Cables [facility]            Patient Active Problem List:     OCD (obsessive compulsive disorder)     Anxiety     Fatigue     Primary fibromyalgia syndrome     Current smoker

## 2021-09-27 DIAGNOSIS — F41.9 ANXIETY: ICD-10-CM

## 2021-09-27 RX ORDER — QUETIAPINE FUMARATE 50 MG/1
TABLET, FILM COATED ORAL
Qty: 135 TABLET | Refills: 1 | Status: SHIPPED | OUTPATIENT
Start: 2021-09-27 | End: 2022-03-29

## 2021-09-27 RX ORDER — PAROXETINE 10 MG/1
10 TABLET, FILM COATED ORAL DAILY
Qty: 90 TABLET | Refills: 1 | Status: SHIPPED | OUTPATIENT
Start: 2021-09-27 | End: 2022-03-29

## 2021-09-27 RX ORDER — CLONAZEPAM 0.5 MG/1
TABLET ORAL
Qty: 60 TABLET | Refills: 1 | OUTPATIENT
Start: 2021-09-27 | End: 2021-11-27

## 2021-09-27 RX ORDER — LORAZEPAM 1 MG/1
TABLET ORAL
Qty: 60 TABLET | Refills: 1 | OUTPATIENT
Start: 2021-09-27 | End: 2021-11-27

## 2021-10-06 ENCOUNTER — OFFICE VISIT (OUTPATIENT)
Dept: FAMILY MEDICINE CLINIC | Age: 51
End: 2021-10-06
Payer: COMMERCIAL

## 2021-10-06 VITALS
HEIGHT: 66 IN | BODY MASS INDEX: 23.95 KG/M2 | DIASTOLIC BLOOD PRESSURE: 70 MMHG | WEIGHT: 149 LBS | HEART RATE: 79 BPM | OXYGEN SATURATION: 100 % | SYSTOLIC BLOOD PRESSURE: 110 MMHG

## 2021-10-06 DIAGNOSIS — F51.01 PRIMARY INSOMNIA: ICD-10-CM

## 2021-10-06 DIAGNOSIS — Z12.11 SCREENING FOR COLORECTAL CANCER: ICD-10-CM

## 2021-10-06 DIAGNOSIS — F41.9 ANXIETY: ICD-10-CM

## 2021-10-06 DIAGNOSIS — Z12.12 SCREENING FOR COLORECTAL CANCER: ICD-10-CM

## 2021-10-06 DIAGNOSIS — G89.29 CHRONIC LEFT SHOULDER PAIN: Primary | ICD-10-CM

## 2021-10-06 DIAGNOSIS — Z12.31 VISIT FOR SCREENING MAMMOGRAM: ICD-10-CM

## 2021-10-06 DIAGNOSIS — M24.812 SHOULDER JOINT CREPITUS, LEFT: ICD-10-CM

## 2021-10-06 DIAGNOSIS — M25.512 CHRONIC LEFT SHOULDER PAIN: Primary | ICD-10-CM

## 2021-10-06 PROCEDURE — 99214 OFFICE O/P EST MOD 30 MIN: CPT | Performed by: FAMILY MEDICINE

## 2021-10-06 RX ORDER — ZOLPIDEM TARTRATE 5 MG/1
5 TABLET ORAL NIGHTLY PRN
Qty: 7 TABLET | Refills: 0 | Status: SHIPPED | OUTPATIENT
Start: 2021-10-06 | End: 2021-10-13

## 2021-10-06 SDOH — ECONOMIC STABILITY: FOOD INSECURITY: WITHIN THE PAST 12 MONTHS, YOU WORRIED THAT YOUR FOOD WOULD RUN OUT BEFORE YOU GOT MONEY TO BUY MORE.: NEVER TRUE

## 2021-10-06 SDOH — ECONOMIC STABILITY: FOOD INSECURITY: WITHIN THE PAST 12 MONTHS, THE FOOD YOU BOUGHT JUST DIDN'T LAST AND YOU DIDN'T HAVE MONEY TO GET MORE.: NEVER TRUE

## 2021-10-06 ASSESSMENT — SOCIAL DETERMINANTS OF HEALTH (SDOH): HOW HARD IS IT FOR YOU TO PAY FOR THE VERY BASICS LIKE FOOD, HOUSING, MEDICAL CARE, AND HEATING?: NOT HARD AT ALL

## 2021-10-06 NOTE — PROGRESS NOTES
Name: Janiya Bolton  : 1970         Chief Complaint:     Chief Complaint   Patient presents with    Anxiety       History of Present Illness:      Janiya Bolton is a 46 y.o.  female who presents with Anxiety      HPI    Remains anxious and overwhelmed. Poor sleep, wakes up every hr completely awake, watches tv and gets a snack, lays down on the couch, up again in less than hr. However, with the ativan she doesn't wake up panicked. On seroquel 50 mg, had been up even to 100 mg without additional benefit so she went back down to the 50. Takes ativan and seroquel together, usually somewhere between 8 and 9:30, later on the weekends d/t other people being at house. May kick in after about an hour but sometimes never feels it kicks in. Chronic L shoulder pain. Can't make it to PT d/t work schedule. MRI wasn't covered by insurance. Changed to different vehicle and just tries to keep the shoulder down all the time to avoid pain. Does do home exercises and makes sure to keep ROM in it. Still has L breast pain also. Better with applying pressure to the area. Rash R foot persists. Only helped by Sharmila moreau, has to apply it consistently. cynthia didn't help. Medical History:     Patient Active Problem List   Diagnosis    OCD (obsessive compulsive disorder)    Anxiety    Fatigue    Primary fibromyalgia syndrome    Current smoker       Medications:       Prior to Admission medications    Medication Sig Start Date End Date Taking? Authorizing Provider   zolpidem (AMBIEN) 5 MG tablet Take 1 tablet by mouth nightly as needed for Sleep for up to 7 days.  10/6/21 10/13/21 Yes Chinyere Salas, DO   QUEtiapine (SEROQUEL) 50 MG tablet TAKE 1 & 1/2 (ONE AND ONE-HALF) TABLETS BY MOUTH NIGHTLY 21  Yes Lamont Olivares, DO   PARoxetine (PAXIL) 10 MG tablet TAKE 1 TABLET BY MOUTH DAILY 21  Yes Lamont Olivares, DO   clonazePAM (KLONOPIN) 0.5 MG tablet TAKE 1 TO 2 TABLETS BY MOUTH EVERY DAY AS NEEDED FOR ANXIETY 7/27/21 10/23/21 Yes Chinyere Salas, DO   LORazepam (ATIVAN) 1 MG tablet TAKE 1 TABLET BY MOUTH EVERY 8 HOURS AS NEEDED FOR ANXIETY 7/27/21 10/23/21 Yes Danay Díaz, DO   Naproxen Sodium 220 MG CAPS Take 220 mg by mouth 12/30/20  Yes Historical Provider, MD   meloxicam (MOBIC) 15 MG tablet Take 15 mg by mouth daily   Yes Historical Provider, MD   pimecrolimus (ELIDEL) 1 % cream Apply topically 2 times daily. 1/7/21  Yes Danay Díaz DO   ibuprofen (ADVIL;MOTRIN) 600 MG tablet Take 1 tablet by mouth 4 times daily as needed for Pain 10/7/20  Yes Nishi Wheat MD   Cholecalciferol (VITAMIN D) 50 MCG (2000 UT) CAPS capsule Take 2,000 Units by mouth daily   Yes Historical Provider, MD        Allergies:       Codeine, Effexor [venlafaxine], and Xanax [alprazolam]    Physical Exam:     Vitals:  /70   Pulse 79   Ht 5' 6\" (1.676 m)   Wt 149 lb (67.6 kg)   LMP 04/05/2014   SpO2 100%   BMI 24.05 kg/m²   Physical Exam  Vitals and nursing note reviewed. Constitutional:       General: She is not in acute distress. Appearance: She is well-developed. Pulmonary:      Effort: Pulmonary effort is normal.   Musculoskeletal:      Comments: L shoulder decreased ROM, crepitus and clunking AC area with abduction and adduction   Skin:     General: Skin is warm and dry. Comments: R sole chronic-appearing erythema, very mild peeling   Neurological:      Mental Status: She is alert and oriented to person, place, and time.    Psychiatric:         Judgment: Judgment normal.         Data:     Lab Results   Component Value Date     02/03/2021    K 4.1 02/03/2021     02/03/2021    CO2 25 02/03/2021    BUN 8 02/03/2021    CREATININE 0.73 02/03/2021    GLUCOSE 100 02/03/2021    PROT 8.6 08/17/2020    LABALBU 4.4 08/17/2020    BILITOT 0.74 08/17/2020    ALKPHOS 102 08/17/2020    AST 18 08/17/2020    ALT 11 08/17/2020     Lab Results   Component Value Date    WBC 9.4 02/03/2021 RBC 4.63 02/03/2021    HGB 13.6 02/03/2021    HCT 42.5 02/03/2021    MCV 91.8 02/03/2021    MCH 29.4 02/03/2021    MCHC 32.0 02/03/2021    RDW 12.2 02/03/2021     02/03/2021    MPV 8.7 02/03/2021     Lab Results   Component Value Date    TSH 0.41 06/08/2020     Lab Results   Component Value Date    CHOL 234 08/17/2020    HDL 60 08/17/2020         Assessment & Plan:        Diagnosis Orders   1. Chronic left shoulder pain  MRI SHOULDER LEFT WO CONTRAST   2. Shoulder joint crepitus, left  MRI SHOULDER LEFT WO CONTRAST   3. Primary insomnia  zolpidem (AMBIEN) 5 MG tablet   4. Anxiety     5. Screening for colorectal cancer  Cologuard (For External Results Only)   6. Visit for screening mammogram  TANI EDNA DIGITAL SCREEN BILATERAL   chronic L shoulder pain for past several mos, mechanical symptoms, neg xr in January. Unable to do formal PT d/t scheduling (travels for work) but has failed home exercises. MRI ordered. Severe insomnia, never sleeps more than 1-2 hrs consecutively. Advised unfortunately few meds to help sleep maintenance, but believe she could have better response to actual sleeping med rather than ativan and seroquel. 2-wk trial of ambien. No ativan along with it but continue seroquel. F/u by phone in 2 wks. 4. Anxiety uncontrolled but declines med change. Hopefully getting more/better sleep will help. Cont same rx. Discussed. Requested Prescriptions     Signed Prescriptions Disp Refills    zolpidem (AMBIEN) 5 MG tablet 7 tablet 0     Sig: Take 1 tablet by mouth nightly as needed for Sleep for up to 7 days. There are no Patient Instructions on file for this visit. Alberta received counseling on the following healthy behaviors: medication adherence  Reviewed prior labs and health maintenance. Continue current medications, diet and exercise. Discussed use, benefit, and side effects of prescribed medications. Barriers to medication compliance addressed.    Patient given educational materials - see patient instructions. All patient questions answered.   Patient voiced understanding.     signed by Harshil Noyola DO on 10/9/2021 at 9:56 PM  [address]  Dept: [phone]

## 2021-10-22 DIAGNOSIS — F41.9 ANXIETY: ICD-10-CM

## 2021-10-22 RX ORDER — CLONAZEPAM 0.5 MG/1
TABLET ORAL
Qty: 60 TABLET | Refills: 2 | Status: SHIPPED | OUTPATIENT
Start: 2021-10-22 | End: 2022-01-24

## 2021-10-22 RX ORDER — LORAZEPAM 1 MG/1
TABLET ORAL
Qty: 60 TABLET | Refills: 2 | Status: SHIPPED | OUTPATIENT
Start: 2021-10-22 | End: 2022-01-24

## 2021-10-22 NOTE — TELEPHONE ENCOUNTER
Last visit:  10/6/2021  Next Visit Date:    Future Appointments   Date Time Provider Dima Elaine   1/6/2022  9:00 AM DO Preeti Nichols [facility]         Medication List:  Prior to Admission medications    Medication Sig Start Date End Date Taking? Authorizing Provider   QUEtiapine (SEROQUEL) 50 MG tablet TAKE 1 & 1/2 (ONE AND ONE-HALF) TABLETS BY MOUTH NIGHTLY 9/27/21   Milton Pipe, DO   PARoxetine (PAXIL) 10 MG tablet TAKE 1 TABLET BY MOUTH DAILY 9/27/21   Milton Leonardae, DO   clonazePAM (KLONOPIN) 0.5 MG tablet TAKE 1 TO 2 TABLETS BY MOUTH EVERY DAY AS NEEDED FOR ANXIETY 7/27/21 10/23/21  Milton Leonardae, DO   LORazepam (ATIVAN) 1 MG tablet TAKE 1 TABLET BY MOUTH EVERY 8 HOURS AS NEEDED FOR ANXIETY 7/27/21 10/23/21  Milton Pipe, DO   Naproxen Sodium 220 MG CAPS Take 220 mg by mouth 12/30/20   Historical Provider, MD   meloxicam (MOBIC) 15 MG tablet Take 15 mg by mouth daily    Historical Provider, MD   pimecrolimus (ELIDEL) 1 % cream Apply topically 2 times daily.  1/7/21   Milton Godinez, DO   ibuprofen (ADVIL;MOTRIN) 600 MG tablet Take 1 tablet by mouth 4 times daily as needed for Pain 10/7/20   Rojas Lopez MD   Cholecalciferol (VITAMIN D) 50 MCG (2000 UT) CAPS capsule Take 2,000 Units by mouth daily    Historical Provider, MD

## 2022-01-24 DIAGNOSIS — F41.9 ANXIETY: ICD-10-CM

## 2022-01-24 RX ORDER — CLONAZEPAM 0.5 MG/1
TABLET ORAL
Qty: 60 TABLET | Refills: 2 | Status: SHIPPED | OUTPATIENT
Start: 2022-01-24 | End: 2022-03-29

## 2022-01-24 RX ORDER — LORAZEPAM 1 MG/1
TABLET ORAL
Qty: 60 TABLET | Refills: 2 | Status: SHIPPED | OUTPATIENT
Start: 2022-01-24 | End: 2022-03-29

## 2022-01-24 NOTE — TELEPHONE ENCOUNTER
Last OV: 10/6/2021  Last RX:    Next scheduled apt: Visit date not found       Rx refill requested through [facility] for:  Clonazepam 0.5mg  Lorazepam 1mg    Rx pending.

## 2022-03-28 DIAGNOSIS — F41.9 ANXIETY: ICD-10-CM

## 2022-03-29 RX ORDER — PAROXETINE 10 MG/1
10 TABLET, FILM COATED ORAL DAILY
Qty: 90 TABLET | Refills: 1 | Status: SHIPPED | OUTPATIENT
Start: 2022-03-29 | End: 2022-08-19 | Stop reason: SDUPTHER

## 2022-03-29 RX ORDER — QUETIAPINE FUMARATE 50 MG/1
TABLET, FILM COATED ORAL
Qty: 135 TABLET | Refills: 1 | Status: SHIPPED | OUTPATIENT
Start: 2022-03-29

## 2022-03-29 RX ORDER — CLONAZEPAM 0.5 MG/1
TABLET ORAL
Qty: 60 TABLET | Refills: 2 | Status: SHIPPED | OUTPATIENT
Start: 2022-03-29 | End: 2022-07-12

## 2022-03-29 RX ORDER — LORAZEPAM 1 MG/1
TABLET ORAL
Qty: 60 TABLET | Refills: 2 | Status: SHIPPED | OUTPATIENT
Start: 2022-03-29 | End: 2022-07-12

## 2022-07-11 DIAGNOSIS — F41.9 ANXIETY: ICD-10-CM

## 2022-07-12 RX ORDER — LORAZEPAM 1 MG/1
TABLET ORAL
Qty: 60 TABLET | Refills: 0 | Status: SHIPPED | OUTPATIENT
Start: 2022-07-12 | End: 2022-08-11

## 2022-07-12 RX ORDER — CLONAZEPAM 0.5 MG/1
TABLET ORAL
Qty: 60 TABLET | Refills: 0 | Status: SHIPPED | OUTPATIENT
Start: 2022-07-12 | End: 2022-08-30

## 2022-07-12 NOTE — TELEPHONE ENCOUNTER
patient  Last OV 10/6/21 for anxiety, insomnia  Requesting refill on klonopin and ativan thru [facility]

## 2022-08-10 DIAGNOSIS — F41.9 ANXIETY: ICD-10-CM

## 2022-08-11 RX ORDER — LORAZEPAM 1 MG/1
TABLET ORAL
Qty: 60 TABLET | Refills: 2 | Status: SHIPPED | OUTPATIENT
Start: 2022-08-11 | End: 2022-11-11

## 2022-08-11 NOTE — TELEPHONE ENCOUNTER
Last OV 10/26/21 for chronic shoulder pain, anxiety, insomnia  Requesting refill on lorazepam thru [facility]

## 2022-08-17 ENCOUNTER — APPOINTMENT (OUTPATIENT)
Dept: GENERAL RADIOLOGY | Age: 52
End: 2022-08-17
Payer: COMMERCIAL

## 2022-08-17 ENCOUNTER — APPOINTMENT (OUTPATIENT)
Dept: CT IMAGING | Age: 52
End: 2022-08-17
Payer: COMMERCIAL

## 2022-08-17 ENCOUNTER — HOSPITAL ENCOUNTER (EMERGENCY)
Age: 52
Discharge: HOME | End: 2022-08-17
Attending: FAMILY MEDICINE
Payer: COMMERCIAL

## 2022-08-17 VITALS
HEART RATE: 71 BPM | RESPIRATION RATE: 11 BRPM | DIASTOLIC BLOOD PRESSURE: 75 MMHG | TEMPERATURE: 98.1 F | BODY MASS INDEX: 24.11 KG/M2 | OXYGEN SATURATION: 96 % | WEIGHT: 150 LBS | HEIGHT: 66 IN | SYSTOLIC BLOOD PRESSURE: 119 MMHG

## 2022-08-17 DIAGNOSIS — R07.89 ATYPICAL CHEST PAIN: Primary | ICD-10-CM

## 2022-08-17 DIAGNOSIS — Z86.59 HISTORY OF ANXIETY: ICD-10-CM

## 2022-08-17 LAB
ABSOLUTE EOS #: 0.2 K/UL (ref 0–0.4)
ABSOLUTE LYMPH #: 4.1 K/UL (ref 1–4.8)
ABSOLUTE MONO #: 0.4 K/UL (ref 0–1)
ANION GAP SERPL CALCULATED.3IONS-SCNC: 10 MMOL/L (ref 9–17)
BASOPHILS # BLD: 1 % (ref 0–2)
BASOPHILS ABSOLUTE: 0.1 K/UL (ref 0–0.2)
BUN BLDV-MCNC: 6 MG/DL (ref 6–20)
BUN/CREAT BLD: 8 (ref 9–20)
CALCIUM SERPL-MCNC: 9.9 MG/DL (ref 8.6–10.4)
CHLORIDE BLD-SCNC: 103 MMOL/L (ref 98–107)
CO2: 25 MMOL/L (ref 20–31)
CREAT SERPL-MCNC: 0.78 MG/DL (ref 0.5–0.9)
D-DIMER QUANTITATIVE: 0.75 MG/L FEU (ref 0–0.59)
DIFFERENTIAL TYPE: YES
EOSINOPHILS RELATIVE PERCENT: 2 % (ref 0–5)
GFR AFRICAN AMERICAN: >60 ML/MIN
GFR NON-AFRICAN AMERICAN: >60 ML/MIN
GFR SERPL CREATININE-BSD FRML MDRD: ABNORMAL ML/MIN/{1.73_M2}
GLUCOSE BLD-MCNC: 104 MG/DL (ref 70–99)
HCT VFR BLD CALC: 45.4 % (ref 36–46)
HEMOGLOBIN: 14.8 G/DL (ref 12–16)
LYMPHOCYTES # BLD: 40 % (ref 15–40)
MCH RBC QN AUTO: 28.9 PG (ref 26–34)
MCHC RBC AUTO-ENTMCNC: 32.6 G/DL (ref 31–37)
MCV RBC AUTO: 88.6 FL (ref 80–100)
MONOCYTES # BLD: 4 % (ref 4–8)
PDW BLD-RTO: 13 % (ref 12.1–15.2)
PLATELET # BLD: 374 K/UL (ref 140–450)
POTASSIUM SERPL-SCNC: 3.7 MMOL/L (ref 3.7–5.3)
RBC # BLD: 5.12 M/UL (ref 4–5.2)
SEG NEUTROPHILS: 53 % (ref 47–75)
SEGMENTED NEUTROPHILS ABSOLUTE COUNT: 5.6 K/UL (ref 2.5–7)
SODIUM BLD-SCNC: 138 MMOL/L (ref 135–144)
TROPONIN, HIGH SENSITIVITY: 7 NG/L (ref 0–14)
TROPONIN, HIGH SENSITIVITY: <6 NG/L (ref 0–14)
WBC # BLD: 10.4 K/UL (ref 3.5–11)

## 2022-08-17 PROCEDURE — 6360000004 HC RX CONTRAST MEDICATION: Performed by: FAMILY MEDICINE

## 2022-08-17 PROCEDURE — 71045 X-RAY EXAM CHEST 1 VIEW: CPT

## 2022-08-17 PROCEDURE — 85025 COMPLETE CBC W/AUTO DIFF WBC: CPT

## 2022-08-17 PROCEDURE — 6360000002 HC RX W HCPCS: Performed by: FAMILY MEDICINE

## 2022-08-17 PROCEDURE — 36415 COLL VENOUS BLD VENIPUNCTURE: CPT

## 2022-08-17 PROCEDURE — 6370000000 HC RX 637 (ALT 250 FOR IP): Performed by: FAMILY MEDICINE

## 2022-08-17 PROCEDURE — 85379 FIBRIN DEGRADATION QUANT: CPT

## 2022-08-17 PROCEDURE — 93005 ELECTROCARDIOGRAM TRACING: CPT | Performed by: FAMILY MEDICINE

## 2022-08-17 PROCEDURE — 96374 THER/PROPH/DIAG INJ IV PUSH: CPT

## 2022-08-17 PROCEDURE — 84484 ASSAY OF TROPONIN QUANT: CPT

## 2022-08-17 PROCEDURE — 80048 BASIC METABOLIC PNL TOTAL CA: CPT

## 2022-08-17 PROCEDURE — 71275 CT ANGIOGRAPHY CHEST: CPT

## 2022-08-17 PROCEDURE — 99285 EMERGENCY DEPT VISIT HI MDM: CPT

## 2022-08-17 RX ORDER — CLONAZEPAM 0.5 MG/1
0.5 TABLET ORAL 2 TIMES DAILY PRN
COMMUNITY
End: 2022-08-19

## 2022-08-17 RX ORDER — ONDANSETRON 2 MG/ML
4 INJECTION INTRAMUSCULAR; INTRAVENOUS ONCE
Status: COMPLETED | OUTPATIENT
Start: 2022-08-17 | End: 2022-08-17

## 2022-08-17 RX ORDER — ASPIRIN 81 MG/1
324 TABLET, CHEWABLE ORAL ONCE
Status: COMPLETED | OUTPATIENT
Start: 2022-08-17 | End: 2022-08-17

## 2022-08-17 RX ADMIN — IOPAMIDOL 100 ML: 755 INJECTION, SOLUTION INTRAVENOUS at 19:20

## 2022-08-17 RX ADMIN — ASPIRIN 81 MG 324 MG: 81 TABLET ORAL at 17:58

## 2022-08-17 RX ADMIN — ONDANSETRON 4 MG: 2 INJECTION INTRAMUSCULAR; INTRAVENOUS at 18:13

## 2022-08-17 ASSESSMENT — PAIN - FUNCTIONAL ASSESSMENT: PAIN_FUNCTIONAL_ASSESSMENT: 0-10

## 2022-08-17 ASSESSMENT — PAIN DESCRIPTION - DESCRIPTORS: DESCRIPTORS: PRESSURE

## 2022-08-17 ASSESSMENT — PAIN SCALES - GENERAL
PAINLEVEL_OUTOF10: 2
PAINLEVEL_OUTOF10: 4

## 2022-08-17 ASSESSMENT — PAIN DESCRIPTION - ORIENTATION: ORIENTATION: MID

## 2022-08-17 ASSESSMENT — PAIN DESCRIPTION - FREQUENCY: FREQUENCY: CONTINUOUS

## 2022-08-17 ASSESSMENT — PAIN DESCRIPTION - ONSET: ONSET: ON-GOING

## 2022-08-17 ASSESSMENT — PAIN DESCRIPTION - LOCATION: LOCATION: CHEST

## 2022-08-17 NOTE — LETTER
[facility] ED  [address]  Phone: [phone]               August 17, 2022    Patient: Zenon Davalos   YOB: 1970   Date of Visit: 8/17/2022       To Whom It May Concern:    Santana Diallo was seen and treated in our emergency department on 8/17/2022. She may return to work on 08/19/22.       Sincerely,       Aditi Campbell RN         Signature:__________________________________

## 2022-08-17 NOTE — ED PROVIDER NOTES
[facility]  eMERGENCY dEPARTMENT eNCOUnter          [facility]       Chief Complaint   Patient presents with    Chest Pain     Chest pain that started about 30 minutes ago         Nurses Notes reviewed and I agree except as noted in the HPI. HISTORY OF PRESENT ILLNESS    Stephanie Ruiz is a 46 y.o. female who presents emergency room via EMS from home, patient states that is having chest pain indicating mid substernal, with some pain going up to her shoulders, describing is very tight, feels short of breath, states this led to her feeling some nausea and did have a little vomiting and does feel lightheaded. Said 30 minutes prior to arrival.  Patient was driving and she pulled into her own  when symptoms started. Patient does acknowledge family history of heart problems, high cholesterol and smoking as risk factors, patient did have a cardiac catheterization in 2020 that was normal after having an abnormal stress test.  Patient rates her pain 4 out of 10    REVIEW OF SYSTEMS     Review of Systems   All other systems reviewed and are negative. PAST MEDICAL HISTORY    has a past medical history of Anxiety. SURGICAL HISTORY      has a past surgical history that includes laparoscopy; Cardiac catheterization (Left, 08/21/2020); Breast surgery (Left, 10/2020); Wrist fracture surgery (Right, 02/05/2021); and Wrist fracture surgery (Right, 2/5/2021). CURRENT MEDICATIONS       Discharge Medication List as of 8/17/2022  8:49 PM        CONTINUE these medications which have NOT CHANGED    Details   clonazePAM (KLONOPIN) 0.5 MG tablet Take 0.5 mg by mouth 2 times daily as needed. Historical Med      LORazepam (ATIVAN) 1 MG tablet TAKE 1 TABLET BY MOUTH EVERY 8 HOURS AS NEEDED FOR ANXIETY, Disp-60 tablet, R-2Normal      QUEtiapine (SEROQUEL) 50 MG tablet TAKE 1 & 1/2 (ONE AND ONE-HALF) TABLETS BY MOUTH NIGHTLY, Disp-135 tablet, R-1We are requesting this refill to have on file for next month s order. Normal      PARoxetine (PAXIL) 10 MG tablet TAKE 1 TABLET BY MOUTH DAILY, Disp-90 tablet, R-1We are requesting this refill to have on file for next month s order. Normal      Naproxen Sodium 220 MG CAPS Take 220 mg by mouthHistorical Med      ibuprofen (ADVIL;MOTRIN) 600 MG tablet Take 1 tablet by mouth 4 times daily as needed for Pain, Disp-40 tablet, R-0Print      Cholecalciferol (VITAMIN D) 50 MCG (2000 UT) CAPS capsule Take 2,000 Units by mouth dailyHistorical Med             ALLERGIES     is allergic to codeine, effexor [venlafaxine], and xanax [alprazolam]. FAMILY HISTORY     has no family status information on file. family history is not on file. SOCIAL HISTORY      reports that she has been smoking cigarettes. She has a 15.00 pack-year smoking history. She has never used smokeless tobacco. She reports current alcohol use. She reports that she does not use drugs. PHYSICAL EXAM     INITIAL VITALS:  height is 5' 6\" (1.676 m) and weight is 150 lb (68 kg). Her oral temperature is 98.1 °F (36.7 °C). Her blood pressure is 119/75 and her pulse is 71. Her respiration is 11 and oxygen saturation is 96%. Physical Exam   Constitutional: Patient is oriented to person, place, and time. Patient appears well-developed and well-nourished. Patient is active and cooperative. HENT:   Head: Normocephalic and atraumatic. Head is without contusion. Right Ear: Hearing and external ear normal. No drainage. Left Ear: Hearing and external ear normal. No drainage. Nose: Nose normal. No nasal deformity. No epistaxis. Mouth/Throat: Mucous membranes are not dry. Eyes: EOMI. Conjunctivae, sclera, and lids are normal. Right eye exhibits no discharge. Left eye exhibits no discharge. Neck: Full passive range of motion without pain and phonation normal.   Cardiovascular:  Normal rate, regular rhythm and intact distal pulses. No edema.   Negative Homans' sign  Pulses: Right radial pulse 2+   Pulmonary/Chest: Effort normal. No tachypnea and no bradypnea. No wheezes, rhonchi, or rales. Abdominal: Soft. Patient without distension or tenderness  Musculoskeletal:   Negative acute trauma or deformity,  apparent full range of motion and normal strength all extremities appropriate to age. Neurological: Patient is alert and oriented to person, place, and time. patient displays no tremor. Patient displays no seizure activity. Skin: Skin is warm and dry. Patient is not diaphoretic. Psychiatric: Patient has a normal mood and slight anxious affect. Patient speech is normal and behavior is normal. Cognition and memory are normal.     DIFFERENTIAL DIAGNOSIS:   ACS, AA, PE, GERD/gastritis, PTX, pericarditis, pleurisy, myocarditis, anxiety, msk    DIAGNOSTIC RESULTS     EKG: All EKG's are interpreted by the Emergency Department Physician who either signs or Co-signs this chart in the absence of a cardiologist.  EKG    The patient had an EKG which is interpreted by me in the absence of a Cardiologist.   [] Without comparison to previous. [x] With comparison to a previous EKG Dated 8/17/2020    EKG @ 1802 hrs -sinus rhythm, rate 80, normal axis, normal intervals, noted RBBB, T wave inversions in inferior and precordial leads, as compared to prior EKG no changes morphology      RADIOLOGY: non-plain film images(s) such as CT, Ultrasound and MRI are read by the radiologist.  CTA CHEST W CONTRAST   Final Result      No evidence of pulmonary embolism. No evidence of acute cardiopulmonary    abnormality.           XR CHEST PORTABLE   Final Result   Negative chest.                LABS:   Labs Reviewed   BASIC METABOLIC PANEL W/ REFLEX TO MG FOR LOW K - Abnormal; Notable for the following components:       Result Value    Glucose 104 (*)     Bun/Cre Ratio 8 (*)     All other components within normal limits   D-DIMER, QUANTITATIVE - Abnormal; Notable for the following components:    D-Dimer, Quant 0.75 (*)     All other components within normal limits   CBC WITH AUTO DIFFERENTIAL   TROPONIN   TROPONIN       EMERGENCY DEPARTMENT COURSE:   Vitals:    Vitals:    08/17/22 1900 08/17/22 1945 08/17/22 2000 08/17/22 2015   BP: (!) 140/77 117/73 122/78 119/75   Pulse: 63 69 74 71   Resp: 11 11 16 11   Temp:       TempSrc:       SpO2: 100%   96%   Weight:       Height:         Patient history and physical exam taken at bedside, discussed patient's symptoms and exam findings as well as initial plan of workup to include EKG, chest x-ray, blood work with saline lock insertion, and chewable aspirin. Patient placed on cardiac monitor and continuous pulse oximetry resting semi-Fowlers in bed. EKG as above. Chest xray as above. Initial lab workup reviewed, noting hs-Faye of 7,  DDImer 0.75    HEART Score = 3    Discussed with patient initial work-up including EKG imaging labs, discussed getting 1 hour troponin will reevaluate, discussed D-dimer being slightly over the normal, discussed risk factors for blood clots, there was some discussion regarding getting CTA chest and after further discussion with patient order placed    2nd hsTnT <6    CTA chest radiology report reviewed    Discussed with patient her overall work-up, at this time no apparent blood clot, no gross cardiac damage, we took anxiety as a possible cause and patient does acknowledge she does have a lot of anxiety this could very much been the case, at this time I feel we can safely discharge patient home, outpatient follow-up, return to ER any symptoms change worse other concerns    FINAL IMPRESSION      1. Atypical chest pain    2.  History of anxiety          DISPOSITION/PLAN   Discharge    PATIENT REFERRED TO:  Kimo Huddleston DO  [address] [place] [address]     Call       [facility] ED  [address] [address] [phone]    As needed, If symptoms worsen    DISCHARGE MEDICATIONS:  Discharge Medication List as of 8/17/2022  8:49 PM              Summation      Patient Course: Discharge    ED Medications administered this visit:    Medications   aspirin chewable tablet 324 mg (324 mg Oral Given 8/17/22 1758)   ondansetron (ZOFRAN) injection 4 mg (4 mg IntraVENous Given 8/17/22 1813)   iopamidol (ISOVUE-370) 76 % injection 100 mL (100 mLs IntraVENous Given 8/17/22 1920)       New Prescriptions from this visit:    Discharge Medication List as of 8/17/2022  8:49 PM          Follow-up:  Carmen Davis DO  [address] [address] [phone]    Call       [facility] ED  [address] [address]  [phone]    As needed, If symptoms worsen      Final Impression:   1. Atypical chest pain    2.  History of anxiety               (Please note that portions of this note were completed with a voice recognition program.  Efforts were made to edit the dictations but occasionally words are mis-transcribed.)    MD Lynn Smith MD  08/18/22 2021

## 2022-08-18 ENCOUNTER — TELEPHONE (OUTPATIENT)
Dept: FAMILY MEDICINE CLINIC | Age: 52
End: 2022-08-18

## 2022-08-18 LAB
EKG ATRIAL RATE: 80 BPM
EKG P AXIS: 78 DEGREES
EKG P-R INTERVAL: 136 MS
EKG Q-T INTERVAL: 418 MS
EKG QRS DURATION: 146 MS
EKG QTC CALCULATION (BAZETT): 482 MS
EKG R AXIS: 78 DEGREES
EKG T AXIS: -27 DEGREES
EKG VENTRICULAR RATE: 80 BPM

## 2022-08-18 PROCEDURE — 93010 ELECTROCARDIOGRAM REPORT: CPT | Performed by: INTERNAL MEDICINE

## 2022-08-18 ASSESSMENT — HEART SCORE: ECG: 1

## 2022-08-18 NOTE — TELEPHONE ENCOUNTER
----- Message from Lilly Kurt sent at 8/18/2022  7:16 AM EDT -----  Subject: Message to Provider    QUESTIONS  Information for Provider? Patient went to ED last night and when left was   a little unsettled with the new she got and wondering if she should be   doing anything before appointment.  ---------------------------------------------------------------------------  --------------  Maximo Cole GSXW  [phone]; OK to leave message on voicemail  ---------------------------------------------------------------------------  --------------  SCRIPT ANSWERS  Relationship to Patient?  Self

## 2022-08-18 NOTE — TELEPHONE ENCOUNTER
Things really looked okay on testing. D-dimer was mildly elevated, which is nonspecific, but she did not have any clot on the imaging. So things were really reassuring and she does not need to do anything different.

## 2022-08-18 NOTE — TELEPHONE ENCOUNTER
[facility] ED Follow up Call    Reason for ED visit:  ALAINA     8/18/2022     Jono Pinzon , this is Halifax Cornea from Dr. Adiel Tinoco office, just calling to see how you are doing after your recent ED visit. Did you receive discharge instructions? Yes  Do you understand the discharge instructions? Yes  Did the ED give you any new prescriptions? Yes  Were you able to fill your prescriptions? Yes      Do you have one of our red, yellow and green  Zone sheets that help you to determine when you should go to the ED? Yes      Do you need or want to make a follow up appt with your PCP? Yes    Do you have any further needs in the home, e.g. equipment?   No        FU appts/Provider:    Future Appointments   Date Time Provider Dima Elaine   8/19/2022 10:40 AM DO Naldo Tse [facility]

## 2022-08-19 ENCOUNTER — OFFICE VISIT (OUTPATIENT)
Dept: FAMILY MEDICINE CLINIC | Age: 52
End: 2022-08-19
Payer: COMMERCIAL

## 2022-08-19 VITALS
DIASTOLIC BLOOD PRESSURE: 72 MMHG | SYSTOLIC BLOOD PRESSURE: 118 MMHG | HEIGHT: 66 IN | OXYGEN SATURATION: 99 % | HEART RATE: 72 BPM | WEIGHT: 152 LBS | BODY MASS INDEX: 24.43 KG/M2

## 2022-08-19 DIAGNOSIS — K21.9 GASTROESOPHAGEAL REFLUX DISEASE WITHOUT ESOPHAGITIS: ICD-10-CM

## 2022-08-19 DIAGNOSIS — R07.89 ATYPICAL CHEST PAIN: Primary | ICD-10-CM

## 2022-08-19 DIAGNOSIS — Z12.31 VISIT FOR SCREENING MAMMOGRAM: ICD-10-CM

## 2022-08-19 DIAGNOSIS — F41.9 SEVERE ANXIETY: ICD-10-CM

## 2022-08-19 PROCEDURE — 99214 OFFICE O/P EST MOD 30 MIN: CPT | Performed by: FAMILY MEDICINE

## 2022-08-19 RX ORDER — PAROXETINE HYDROCHLORIDE 20 MG/1
20 TABLET, FILM COATED ORAL DAILY
Qty: 90 TABLET | Refills: 1 | Status: SHIPPED | OUTPATIENT
Start: 2022-08-19

## 2022-08-19 ASSESSMENT — PATIENT HEALTH QUESTIONNAIRE - PHQ9
1. LITTLE INTEREST OR PLEASURE IN DOING THINGS: 1
SUM OF ALL RESPONSES TO PHQ QUESTIONS 1-9: 2
2. FEELING DOWN, DEPRESSED OR HOPELESS: 1
SUM OF ALL RESPONSES TO PHQ9 QUESTIONS 1 & 2: 2
SUM OF ALL RESPONSES TO PHQ QUESTIONS 1-9: 2

## 2022-08-19 NOTE — PROGRESS NOTES
Name: Shannon Rob  : 1970         Chief Complaint:     Chief Complaint   Patient presents with    Chest Pain    Anxiety       History of Present Illness:      Shannon Rob is a 46 y.o.  female who presents with Chest Pain and Anxiety      HPI    Patient presents for follow-up from ER visit 2 days ago for chest pain. Coming home from work got tightness in chest and then pain. Thought symptoms were being caused by anxiety. Then as soon as she got in the house she puked up acid into mouth. She was very afraid that she could be having a heart attack, drove self to the ER and on the way was lightheaded, saw stars. No recurrence of symptoms. Continues to have pablito shoulder pain L>R, improved. Got a different vehicle which is easier to steer, which seemed to help. Extensive stress and anxiety recently, frequent panic attacks, woke up with a terrible 1 during the night recently, worst she has had in years. Estranged from her daughter and granddaughters and has been in a very low place. Continues meds as prescribed. Had been on Paxil 20 mg in the past, decreased dose because she did not want to be on so much medication. Reluctant to be on higher dose as she has had great difficulty with withdrawal symptoms in the past, even with dose decreases. Medical History:     Patient Active Problem List   Diagnosis    OCD (obsessive compulsive disorder)    Anxiety    Primary fibromyalgia syndrome    Current smoker       Medications:       Prior to Admission medications    Medication Sig Start Date End Date Taking?  Authorizing Provider   PARoxetine (PAXIL) 20 MG tablet Take 1 tablet by mouth daily 22  Yes Jeannie Arvizu,    LORazepam (ATIVAN) 1 MG tablet TAKE 1 TABLET BY MOUTH EVERY 8 HOURS AS NEEDED FOR ANXIETY 22 Yes Jeannie Arvizu,    clonazePAM (KLONOPIN) 0.5 MG tablet TAKE 1 TO 2 TABLETS BY MOUTH EVERY DAY AS NEEDED FOR ANXIETY 22 Yes DORA Pino - CNP   QUEtiapine (SEROQUEL) 50 MG tablet TAKE 1 & 1/2 (ONE AND ONE-HALF) TABLETS BY MOUTH NIGHTLY 3/29/22  Yes Mey Paredes,    Naproxen Sodium 220 MG CAPS Take 220 mg by mouth 12/30/20  Yes Historical Provider, MD   ibuprofen (ADVIL;MOTRIN) 600 MG tablet Take 1 tablet by mouth 4 times daily as needed for Pain 10/7/20  Yes María Carlos MD   Cholecalciferol (VITAMIN D) 50 MCG (2000 UT) CAPS capsule Take 2,000 Units by mouth daily   Yes Historical Provider, MD        Allergies:       Codeine, Celexa [citalopram], Effexor [venlafaxine], and Xanax [alprazolam]    Physical Exam:     Vitals:  /72   Pulse 72   Ht 5' 6\" (1.676 m)   Wt 152 lb (68.9 kg)   LMP 04/05/2014   SpO2 99%   BMI 24.53 kg/m²   Physical Exam  Vitals and nursing note reviewed. Constitutional:       General: She is not in acute distress. Appearance: She is well-developed. Pulmonary:      Effort: Pulmonary effort is normal.   Skin:     General: Skin is warm and dry. Neurological:      Mental Status: She is alert and oriented to person, place, and time.    Psychiatric:         Mood and Affect: Mood normal.         Behavior: Behavior normal.       Data:     Lab Results   Component Value Date/Time     08/17/2022 05:55 PM    K 3.7 08/17/2022 05:55 PM     08/17/2022 05:55 PM    CO2 25 08/17/2022 05:55 PM    BUN 6 08/17/2022 05:55 PM    CREATININE 0.78 08/17/2022 05:55 PM    GLUCOSE 104 08/17/2022 05:55 PM    PROT 8.6 08/17/2020 09:03 AM    LABALBU 4.4 08/17/2020 09:03 AM    BILITOT 0.74 08/17/2020 09:03 AM    ALKPHOS 102 08/17/2020 09:03 AM    AST 18 08/17/2020 09:03 AM    ALT 11 08/17/2020 09:03 AM     Lab Results   Component Value Date/Time    WBC 10.4 08/17/2022 05:55 PM    RBC 5.12 08/17/2022 05:55 PM    HGB 14.8 08/17/2022 05:55 PM    HCT 45.4 08/17/2022 05:55 PM    MCV 88.6 08/17/2022 05:55 PM    MCH 28.9 08/17/2022 05:55 PM    MCHC 32.6 08/17/2022 05:55 PM    RDW 13.0 08/17/2022 05:55 PM     08/17/2022 05:55 PM    MPV 8.7 02/03/2021 02:04 PM     Lab Results   Component Value Date/Time    TSH 0.41 06/08/2020 08:06 AM     Lab Results   Component Value Date/Time    CHOL 234 08/17/2020 09:03 AM    HDL 60 08/17/2020 09:03 AM         Assessment & Plan:        Diagnosis Orders   1. Atypical chest pain        2. Gastroesophageal reflux disease without esophagitis        3. Severe anxiety        4. Visit for screening mammogram  TANI EDNA DIGITAL SCREEN BILATERAL        Reviewed ER records, abnormal EKG but stable from previous. Reassured patient. She was also very concerned about her D-dimer result but I advised her that with actual clotting D-dimer level is typically much more elevated. Do believe her symptoms were largely related anxiety and may have also had element of acid reflux. May try Pepcid and continue Tums as needed also. Severe anxiety, very unfortunate family situation which we discussed at length today. Increase Paxil dose and continue other meds at same dosing. Follow-up 3 months.     Requested Prescriptions     Signed Prescriptions Disp Refills    PARoxetine (PAXIL) 20 MG tablet 90 tablet 1     Sig: Take 1 tablet by mouth daily         There are no Patient Instructions on file for this visit.      signed by Dannielle Choi DO on 8/19/2022 at 12:41 PM  [address]  [facility] [place], [address]  Dept: [phone]

## 2022-08-30 DIAGNOSIS — F41.9 ANXIETY: ICD-10-CM

## 2022-08-30 RX ORDER — CLONAZEPAM 0.5 MG/1
TABLET ORAL
Qty: 60 TABLET | Refills: 2 | Status: SHIPPED | OUTPATIENT
Start: 2022-08-30 | End: 2022-11-30

## 2022-08-30 RX ORDER — PAROXETINE HYDROCHLORIDE 20 MG/1
20 TABLET, FILM COATED ORAL DAILY
Qty: 90 TABLET | Refills: 1 | OUTPATIENT
Start: 2022-08-30

## 2022-08-30 NOTE — TELEPHONE ENCOUNTER
Last visit:  8/19/2022  Next Visit Date:    Future Appointments   Date Time Provider Dima Elaine   11/21/2022  9:40 AM DO Mariela Hawley [facility]         Medication List:  Prior to Admission medications    Medication Sig Start Date End Date Taking?  Authorizing Provider   PARoxetine (PAXIL) 20 MG tablet Take 1 tablet by mouth daily 8/19/22   Izzy Hutson DO   LORazepam (ATIVAN) 1 MG tablet TAKE 1 TABLET BY MOUTH EVERY 8 HOURS AS NEEDED FOR ANXIETY 8/11/22 11/11/22  Izzy Hutson DO   clonazePAM (KLONOPIN) 0.5 MG tablet TAKE 1 TO 2 TABLETS BY MOUTH EVERY DAY AS NEEDED FOR ANXIETY 7/12/22 8/19/22  Paris Eisenmenger, APRN - CNP   QUEtiapine (SEROQUEL) 50 MG tablet TAKE 1 & 1/2 (ONE AND ONE-HALF) TABLETS BY MOUTH NIGHTLY 3/29/22   Izzy Hutson DO   Naproxen Sodium 220 MG CAPS Take 220 mg by mouth 12/30/20   Historical Provider, MD   ibuprofen (ADVIL;MOTRIN) 600 MG tablet Take 1 tablet by mouth 4 times daily as needed for Pain 10/7/20   Cary Ortiz MD   Cholecalciferol (VITAMIN D) 50 MCG (2000 UT) CAPS capsule Take 2,000 Units by mouth daily    Historical Provider, MD

## 2022-11-28 RX ORDER — QUETIAPINE FUMARATE 50 MG/1
TABLET, FILM COATED ORAL
Qty: 135 TABLET | Refills: 1 | Status: SHIPPED | OUTPATIENT
Start: 2022-11-28

## 2022-12-20 DIAGNOSIS — F41.9 ANXIETY: ICD-10-CM

## 2022-12-21 RX ORDER — CLONAZEPAM 0.5 MG/1
TABLET ORAL
Qty: 60 TABLET | Refills: 0 | Status: SHIPPED | OUTPATIENT
Start: 2022-12-21 | End: 2023-03-20

## 2022-12-21 RX ORDER — LORAZEPAM 1 MG/1
TABLET ORAL
Qty: 60 TABLET | Refills: 0 | Status: SHIPPED | OUTPATIENT
Start: 2022-12-21 | End: 2023-03-20

## 2022-12-21 NOTE — TELEPHONE ENCOUNTER
Last OV: 8/19/2022 ED follow up    Next scheduled apt: Visit date not found      Surescripts requesting refill of Lorazepam and Clonazepam  Medications pending

## 2022-12-28 ENCOUNTER — NURSE ONLY (OUTPATIENT)
Dept: FAMILY MEDICINE CLINIC | Age: 52
End: 2022-12-28

## 2022-12-28 ENCOUNTER — TELEPHONE (OUTPATIENT)
Dept: FAMILY MEDICINE CLINIC | Age: 52
End: 2022-12-28

## 2022-12-28 DIAGNOSIS — R50.9 ACUTE FEBRILE ILLNESS: Primary | ICD-10-CM

## 2022-12-28 LAB
INFLUENZA A ANTIGEN, POC: POSITIVE
INFLUENZA B ANTIGEN, POC: NEGATIVE
LOT NUMBER POC: ABNORMAL
SARS-COV-2 RNA POC - COV: ABNORMAL
VALID INTERNAL CONTROL, POC: YES
VENDOR AND KIT NAME POC: ABNORMAL

## 2022-12-28 RX ORDER — OSELTAMIVIR PHOSPHATE 75 MG/1
75 CAPSULE ORAL 2 TIMES DAILY
Qty: 10 CAPSULE | Refills: 0 | Status: SHIPPED | OUTPATIENT
Start: 2022-12-28 | End: 2023-01-02

## 2022-12-28 NOTE — TELEPHONE ENCOUNTER
Pt called stating that she has had headache, body aches, fatigue, fever, and SOB x 2 days. Pt went to walk-in yesterday and was unable to be seen. She tested herself for COVID which was negative. Can pt come in as a lab visit and be swabbed for flu?  Please advise

## 2023-01-15 DIAGNOSIS — F41.9 ANXIETY: ICD-10-CM

## 2023-01-16 RX ORDER — CLONAZEPAM 0.5 MG/1
TABLET ORAL
Qty: 60 TABLET | Refills: 0 | OUTPATIENT
Start: 2023-01-16 | End: 2023-02-16

## 2023-01-16 RX ORDER — LORAZEPAM 1 MG/1
TABLET ORAL
Qty: 60 TABLET | Refills: 0 | OUTPATIENT
Start: 2023-01-16 | End: 2023-02-16

## 2023-01-16 NOTE — TELEPHONE ENCOUNTER
Last OV: 8/19/2022   ED FU   Last RX:    Next scheduled apt: Visit date not found            Surescript requesting a refill

## 2023-02-13 ENCOUNTER — OFFICE VISIT (OUTPATIENT)
Dept: FAMILY MEDICINE CLINIC | Age: 53
End: 2023-02-13
Payer: COMMERCIAL

## 2023-02-13 VITALS
OXYGEN SATURATION: 98 % | DIASTOLIC BLOOD PRESSURE: 78 MMHG | HEART RATE: 78 BPM | WEIGHT: 154 LBS | BODY MASS INDEX: 24.86 KG/M2 | SYSTOLIC BLOOD PRESSURE: 120 MMHG

## 2023-02-13 DIAGNOSIS — F98.8 ATTENTION DEFICIT DISORDER (ADD) IN ADULT: Primary | ICD-10-CM

## 2023-02-13 DIAGNOSIS — G47.09 SECONDARY INSOMNIA: ICD-10-CM

## 2023-02-13 DIAGNOSIS — F41.9 ANXIETY: ICD-10-CM

## 2023-02-13 PROCEDURE — 99214 OFFICE O/P EST MOD 30 MIN: CPT | Performed by: FAMILY MEDICINE

## 2023-02-13 RX ORDER — CLONAZEPAM 0.5 MG/1
TABLET ORAL
Qty: 60 TABLET | Refills: 0 | Status: SHIPPED | OUTPATIENT
Start: 2023-02-13 | End: 2023-05-13

## 2023-02-13 RX ORDER — LORAZEPAM 1 MG/1
TABLET ORAL
Qty: 60 TABLET | Refills: 2 | Status: SHIPPED | OUTPATIENT
Start: 2023-02-13 | End: 2023-05-13

## 2023-02-13 RX ORDER — PAROXETINE HYDROCHLORIDE 20 MG/1
20 TABLET, FILM COATED ORAL DAILY
Qty: 90 TABLET | Refills: 1 | Status: SHIPPED | OUTPATIENT
Start: 2023-02-13

## 2023-02-13 RX ORDER — EPINEPHRINE 0.3 MG/.3ML
0.3 INJECTION SUBCUTANEOUS ONCE
Qty: 0.3 ML | Refills: 0 | Status: SHIPPED | OUTPATIENT
Start: 2023-02-13 | End: 2023-02-13

## 2023-02-13 RX ORDER — METHYLPHENIDATE HYDROCHLORIDE 10 MG/1
10 TABLET ORAL 2 TIMES DAILY PRN
Qty: 28 TABLET | Refills: 0 | Status: SHIPPED | OUTPATIENT
Start: 2023-02-13 | End: 2023-02-27

## 2023-02-13 SDOH — ECONOMIC STABILITY: FOOD INSECURITY: WITHIN THE PAST 12 MONTHS, YOU WORRIED THAT YOUR FOOD WOULD RUN OUT BEFORE YOU GOT MONEY TO BUY MORE.: NEVER TRUE

## 2023-02-13 SDOH — ECONOMIC STABILITY: HOUSING INSECURITY
IN THE LAST 12 MONTHS, WAS THERE A TIME WHEN YOU DID NOT HAVE A STEADY PLACE TO SLEEP OR SLEPT IN A SHELTER (INCLUDING NOW)?: NO

## 2023-02-13 SDOH — ECONOMIC STABILITY: FOOD INSECURITY: WITHIN THE PAST 12 MONTHS, THE FOOD YOU BOUGHT JUST DIDN'T LAST AND YOU DIDN'T HAVE MONEY TO GET MORE.: NEVER TRUE

## 2023-02-13 SDOH — ECONOMIC STABILITY: INCOME INSECURITY: HOW HARD IS IT FOR YOU TO PAY FOR THE VERY BASICS LIKE FOOD, HOUSING, MEDICAL CARE, AND HEATING?: NOT HARD AT ALL

## 2023-02-13 ASSESSMENT — PATIENT HEALTH QUESTIONNAIRE - PHQ9
SUM OF ALL RESPONSES TO PHQ9 QUESTIONS 1 & 2: 0
SUM OF ALL RESPONSES TO PHQ QUESTIONS 1-9: 0
1. LITTLE INTEREST OR PLEASURE IN DOING THINGS: 0
SUM OF ALL RESPONSES TO PHQ QUESTIONS 1-9: 0
2. FEELING DOWN, DEPRESSED OR HOPELESS: 0

## 2023-02-13 NOTE — PROGRESS NOTES
Name: Mila Aase  : 1970         Chief Complaint:     Chief Complaint   Patient presents with    Anxiety       History of Present Illness:      Mila Aase is a 46 y.o.  female who presents with Anxiety      HPI    C/o trouble with short-term memory, at work going from one project to the next and forgetting what she was doing before, trouble focusing. Doesn't feel any different with/without klonopin. Sleep still very disrupted. Takes seroquel, had been taking 1.5 per night and for the past week has been taking 2. Ativan 2 mg every night. Rarely waking up with a panic attack now but still has poor sleep. If she does have a panic attack it'll be situational r/t particular stress and then can happen 3-4 nights in a row. Would like to stop taking Seroquel, questions how well it works. Yrs ago had been on trazodone and thinks it worked well and was well tolerated. Planning on botox and would like to have epi pen just in case    L shoulder click still present sometimes, can manage it. R ankle some trouble too, knees cracking. H/o L breast surgery, excision of a painful lump. Area not feeling well again. Does have upcoming mammogram.     Medical History:     Patient Active Problem List   Diagnosis    OCD (obsessive compulsive disorder)    Anxiety    Primary fibromyalgia syndrome    Current smoker       Medications:       Prior to Admission medications    Medication Sig Start Date End Date Taking?  Authorizing Provider   PARoxetine (PAXIL) 20 MG tablet Take 1 tablet by mouth daily 23  Yes Danay Díaz, DO   clonazePAM (KLONOPIN) 0.5 MG tablet TAKE 1 TO 2 TABLETS BY MOUTH EVERY DAY AS NEEDED FOR ANXIETY 23 Yes Chinyere Salas, DO   LORazepam (ATIVAN) 1 MG tablet TAKE 1 TABLET BY MOUTH EVERY 8 HOURS AS NEEDED FOR ANXIETY 23 Yes Danay Díaz, DO   EPINEPHrine (EPIPEN 2-JOSE) 0.3 MG/0.3ML SOAJ injection Inject 0.3 mLs into the muscle once for 1 dose Use as directed for allergic reaction 2/13/23 2/13/23 Yes Ezzie Courts, DO   methylphenidate (RITALIN) 10 MG tablet Take 1 tablet by mouth 2 times daily as needed (difficulty concentrating) for up to 14 days. Max Daily Amount: 20 mg 2/13/23 2/27/23 Yes Ezzie Courts, DO   ibuprofen (ADVIL;MOTRIN) 600 MG tablet Take 1 tablet by mouth 4 times daily as needed for Pain 10/7/20  Yes Bryanna Hall MD   Cholecalciferol (VITAMIN D) 50 MCG (2000 UT) CAPS capsule Take 2,000 Units by mouth daily   Yes Historical Provider, MD        Allergies:       Codeine, Celexa [citalopram], Effexor [venlafaxine], and Xanax [alprazolam]    Physical Exam:     Vitals:  /78   Pulse 78   Wt 154 lb (69.9 kg)   LMP 04/05/2014   SpO2 98%   BMI 24.86 kg/m²   Physical Exam  Vitals and nursing note reviewed. Constitutional:       General: She is not in acute distress. Appearance: She is well-developed. Pulmonary:      Effort: Pulmonary effort is normal.   Skin:     General: Skin is warm and dry. Neurological:      Mental Status: She is alert and oriented to person, place, and time.    Psychiatric:         Mood and Affect: Mood normal.         Behavior: Behavior normal.       Data:     Lab Results   Component Value Date/Time     08/17/2022 05:55 PM    K 3.7 08/17/2022 05:55 PM     08/17/2022 05:55 PM    CO2 25 08/17/2022 05:55 PM    BUN 6 08/17/2022 05:55 PM    CREATININE 0.78 08/17/2022 05:55 PM    GLUCOSE 104 08/17/2022 05:55 PM    PROT 8.6 08/17/2020 09:03 AM    LABALBU 4.4 08/17/2020 09:03 AM    BILITOT 0.74 08/17/2020 09:03 AM    ALKPHOS 102 08/17/2020 09:03 AM    AST 18 08/17/2020 09:03 AM    ALT 11 08/17/2020 09:03 AM     Lab Results   Component Value Date/Time    WBC 10.4 08/17/2022 05:55 PM    RBC 5.12 08/17/2022 05:55 PM    HGB 14.8 08/17/2022 05:55 PM    HCT 45.4 08/17/2022 05:55 PM    MCV 88.6 08/17/2022 05:55 PM    MCH 28.9 08/17/2022 05:55 PM    MCHC 32.6 08/17/2022 05:55 PM    RDW 13.0 08/17/2022 05:55 PM     08/17/2022 05:55 PM    MPV 8.7 02/03/2021 02:04 PM     Lab Results   Component Value Date/Time    TSH 0.41 06/08/2020 08:06 AM     Lab Results   Component Value Date/Time    CHOL 234 08/17/2020 09:03 AM    HDL 60 08/17/2020 09:03 AM         Assessment & Plan:        Diagnosis Orders   1. Attention deficit disorder (ADD) in adult  methylphenidate (RITALIN) 10 MG tablet      2. Anxiety  clonazePAM (KLONOPIN) 0.5 MG tablet    LORazepam (ATIVAN) 1 MG tablet      3. Secondary insomnia          1. Great difficulty concentrating and staying on track, symptoms consistent with adult ADD. Trial of her acting stimulant. Call with response and will adjust dose as appropriate, may change to extended release. 2.  Anxiety controlled, continue Paxil. Wean off Klonopin. Continue Ativan as needed for sleep. 3.  Secondary insomnia, not being helped as well by Seroquel and Ativan anymore. Try seroquel just 1 tab nightly for 1-2 wks and then if ok can stop taking it and we'll start trazodone if she's doing ok. Requested Prescriptions     Signed Prescriptions Disp Refills    PARoxetine (PAXIL) 20 MG tablet 90 tablet 1     Sig: Take 1 tablet by mouth daily    clonazePAM (KLONOPIN) 0.5 MG tablet 60 tablet 0     Sig: TAKE 1 TO 2 TABLETS BY MOUTH EVERY DAY AS NEEDED FOR ANXIETY    LORazepam (ATIVAN) 1 MG tablet 60 tablet 2     Sig: TAKE 1 TABLET BY MOUTH EVERY 8 HOURS AS NEEDED FOR ANXIETY    EPINEPHrine (EPIPEN 2-JOSE) 0.3 MG/0.3ML SOAJ injection 0.3 mL 0     Sig: Inject 0.3 mLs into the muscle once for 1 dose Use as directed for allergic reaction    methylphenidate (RITALIN) 10 MG tablet 28 tablet 0     Sig: Take 1 tablet by mouth 2 times daily as needed (difficulty concentrating) for up to 14 days.  Max Daily Amount: 20 mg         There are no Patient Instructions on file for this visit.      signed by Harshil Noyola DO on 2/15/2023 at 10:27 PM  [address] [place] [address] [facility] [address]  Dept: [phone]

## 2023-02-19 DIAGNOSIS — F98.8 ATTENTION DEFICIT DISORDER (ADD) IN ADULT: ICD-10-CM

## 2023-02-19 DIAGNOSIS — F41.9 ANXIETY: ICD-10-CM

## 2023-02-20 RX ORDER — CLONAZEPAM 0.5 MG/1
TABLET ORAL
Qty: 60 TABLET | Refills: 0 | OUTPATIENT
Start: 2023-02-20

## 2023-02-20 RX ORDER — EPINEPHRINE 0.3 MG/.3ML
0.3 INJECTION SUBCUTANEOUS ONCE
Qty: 2 EACH | Refills: 0 | OUTPATIENT
Start: 2023-02-20 | End: 2023-02-20

## 2023-02-20 RX ORDER — METHYLPHENIDATE HYDROCHLORIDE 10 MG/1
TABLET ORAL
Qty: 28 TABLET | Refills: 0 | OUTPATIENT
Start: 2023-02-20

## 2023-02-22 ENCOUNTER — TELEPHONE (OUTPATIENT)
Dept: FAMILY MEDICINE CLINIC | Age: 53
End: 2023-02-22

## 2023-02-22 DIAGNOSIS — F98.8 ATTENTION DEFICIT DISORDER (ADD) IN ADULT: ICD-10-CM

## 2023-02-22 RX ORDER — METHYLPHENIDATE HYDROCHLORIDE 20 MG/1
20 TABLET ORAL 2 TIMES DAILY PRN
Qty: 28 TABLET | Refills: 0 | Status: SHIPPED | OUTPATIENT
Start: 2023-02-22 | End: 2023-03-08

## 2023-02-22 RX ORDER — TRAZODONE HYDROCHLORIDE 50 MG/1
50-100 TABLET ORAL NIGHTLY
Qty: 60 TABLET | Refills: 2 | Status: SHIPPED | OUTPATIENT
Start: 2023-02-22

## 2023-02-22 NOTE — TELEPHONE ENCOUNTER
Patient is asking for Tiarra Rockwell to call her. Patient last seen 2/13/23.       Aniceto Reddy [phone]    Health Maintenance   Topic Date Due    Pneumococcal 0-64 years Vaccine (1 - PCV) Never done    HIV screen  Never done    Hepatitis C screen  Never done    Cervical cancer screen  Never done    Colorectal Cancer Screen  Never done    Shingles vaccine (1 of 2) Never done    COVID-19 Vaccine (4 - Booster for Pfizer series) 01/18/2022    Flu vaccine (1) 08/01/2022    Breast cancer screen  03/30/2023    Depression Screen  02/13/2024    Lipids  08/17/2025    DTaP/Tdap/Td vaccine (2 - Td or Tdap) 10/07/2030    Hepatitis A vaccine  Aged Out    Hib vaccine  Aged Out    Meningococcal (ACWY) vaccine  Aged Out             (applicable per patient's age: Cancer Screenings, Depression Screening, Fall Risk Screening, Immunizations)    LDL Cholesterol (mg/dL)   Date Value   08/17/2020 151 (H)     AST (U/L)   Date Value   08/17/2020 18     ALT (U/L)   Date Value   08/17/2020 11     BUN (mg/dL)   Date Value   08/17/2022 6      (goal A1C is < 7)   (goal LDL is <100) need 30-50% reduction from baseline     BP Readings from Last 3 Encounters:   02/13/23 120/78   08/19/22 118/72   08/17/22 119/75    (goal /80)      All Future Testing planned in [facility]:  Lab Frequency Next Occurrence   TANI EDNA DIGITAL SCREEN BILATERAL Once 08/19/2022       Next Visit Date:  Future Appointments   Date Time Provider Dima Elaine   2/23/2023 10:30 AM [address] [facility] [address]            Patient Active Problem List:     OCD (obsessive compulsive disorder)     Anxiety     Primary fibromyalgia syndrome     Current smoker

## 2023-02-22 NOTE — TELEPHONE ENCOUNTER
Decreased seroquel to 50mg and hasn't noticed a change in her mood. Asking to start trazodone. Patient states that Ritalin 10mg had no effect. She did increase to 15mg once and noticed that she was able to think and communicate more clearly. Is she able to take 20mg once daily?

## 2023-02-23 ENCOUNTER — HOSPITAL ENCOUNTER (OUTPATIENT)
Dept: MAMMOGRAPHY | Age: 53
Discharge: HOME | End: 2023-02-25
Payer: COMMERCIAL

## 2023-02-23 DIAGNOSIS — Z12.31 VISIT FOR SCREENING MAMMOGRAM: ICD-10-CM

## 2023-02-23 PROCEDURE — 77063 BREAST TOMOSYNTHESIS BI: CPT

## 2023-03-03 ENCOUNTER — TELEPHONE (OUTPATIENT)
Dept: FAMILY MEDICINE CLINIC | Age: 53
End: 2023-03-03

## 2023-03-03 NOTE — TELEPHONE ENCOUNTER
Patient complains of leg cramps x 6 months. When she first started was every night, now thru the day and night. She said last night was so bad, she could not get it calmed down. Calf down to ankle. No strength in that leg, leg feels weak  It is her left leg    She also mentions she feels like she can not control her urine. She is becoming incontinent. Noticed it getting worse in the last 2 to 3 weeks. She is concerned something more is going on    She has some swelling around her ankle, no redness. She is out of town until 4:30 pm today  Please advise.

## 2023-03-07 ENCOUNTER — HOSPITAL ENCOUNTER (OUTPATIENT)
Age: 53
Discharge: HOME | End: 2023-03-07
Payer: COMMERCIAL

## 2023-03-07 ENCOUNTER — OFFICE VISIT (OUTPATIENT)
Dept: FAMILY MEDICINE CLINIC | Age: 53
End: 2023-03-07
Payer: COMMERCIAL

## 2023-03-07 VITALS
SYSTOLIC BLOOD PRESSURE: 118 MMHG | OXYGEN SATURATION: 99 % | HEIGHT: 66 IN | BODY MASS INDEX: 24.11 KG/M2 | DIASTOLIC BLOOD PRESSURE: 74 MMHG | HEART RATE: 96 BPM | WEIGHT: 150 LBS

## 2023-03-07 DIAGNOSIS — Z13.6 SCREENING FOR CARDIOVASCULAR CONDITION: ICD-10-CM

## 2023-03-07 DIAGNOSIS — R29.2 HYPERREFLEXIA: ICD-10-CM

## 2023-03-07 DIAGNOSIS — M62.81 MUSCLE WEAKNESS: ICD-10-CM

## 2023-03-07 DIAGNOSIS — M62.81 MUSCLE WEAKNESS: Primary | ICD-10-CM

## 2023-03-07 DIAGNOSIS — N39.42 URINARY INCONTINENCE WITHOUT SENSORY AWARENESS: ICD-10-CM

## 2023-03-07 DIAGNOSIS — R73.09 ABNORMAL GLUCOSE: ICD-10-CM

## 2023-03-07 DIAGNOSIS — M79.89 SWELLING OF BOTH LOWER EXTREMITIES: ICD-10-CM

## 2023-03-07 DIAGNOSIS — R27.0 ATAXIA: ICD-10-CM

## 2023-03-07 LAB
ABSOLUTE EOS #: 0.1 K/UL (ref 0–0.4)
ABSOLUTE LYMPH #: 2.3 K/UL (ref 1–4.8)
ABSOLUTE MONO #: 0.3 K/UL (ref 0–1)
ALBUMIN SERPL-MCNC: 4.2 G/DL (ref 3.5–5.2)
ALP SERPL-CCNC: 89 U/L (ref 35–104)
ALT SERPL-CCNC: 9 U/L (ref 5–33)
ANION GAP SERPL CALCULATED.3IONS-SCNC: 10 MMOL/L (ref 9–17)
AST SERPL-CCNC: 13 U/L
BASOPHILS # BLD: 1 % (ref 0–2)
BASOPHILS ABSOLUTE: 0 K/UL (ref 0–0.2)
BILIRUB SERPL-MCNC: 0.4 MG/DL (ref 0.3–1.2)
BUN SERPL-MCNC: 14 MG/DL (ref 6–20)
BUN/CREAT BLD: 16 (ref 9–20)
CALCIUM SERPL-MCNC: 9.2 MG/DL (ref 8.6–10.4)
CHLORIDE SERPL-SCNC: 102 MMOL/L (ref 98–107)
CK SERPL-CCNC: 47 U/L (ref 26–192)
CO2 SERPL-SCNC: 26 MMOL/L (ref 20–31)
CREAT SERPL-MCNC: 0.86 MG/DL (ref 0.5–0.9)
DIFFERENTIAL TYPE: YES
EOSINOPHILS RELATIVE PERCENT: 2 % (ref 0–5)
ERYTHROCYTE [SEDIMENTATION RATE] IN BLOOD BY WESTERGREN METHOD: 6 MM/HR (ref 0–30)
GFR SERPL CREATININE-BSD FRML MDRD: >60 ML/MIN/1.73M2
GLUCOSE SERPL-MCNC: 91 MG/DL (ref 70–99)
HCT VFR BLD AUTO: 43.1 % (ref 36–46)
HGB BLD-MCNC: 14.2 G/DL (ref 12–16)
LYMPHOCYTES # BLD: 27 % (ref 15–40)
MAGNESIUM SERPL-MCNC: 1.9 MG/DL (ref 1.6–2.6)
MCH RBC QN AUTO: 29.6 PG (ref 26–34)
MCHC RBC AUTO-ENTMCNC: 32.9 G/DL (ref 31–37)
MCV RBC AUTO: 90 FL (ref 80–100)
MONOCYTES # BLD: 4 % (ref 4–8)
PATIENT FASTING?: NO
PDW BLD-RTO: 12.8 % (ref 12.1–15.2)
PLATELET # BLD AUTO: 338 K/UL (ref 140–450)
POTASSIUM SERPL-SCNC: 3.6 MMOL/L (ref 3.7–5.3)
PROT SERPL-MCNC: 7.3 G/DL (ref 6.4–8.3)
RBC # BLD: 4.8 M/UL (ref 4–5.2)
SEG NEUTROPHILS: 66 % (ref 47–75)
SEGMENTED NEUTROPHILS ABSOLUTE COUNT: 5.6 K/UL (ref 2.5–7)
SODIUM SERPL-SCNC: 138 MMOL/L (ref 135–144)
TSH SERPL-ACNC: 0.26 UIU/ML (ref 0.3–5)
WBC # BLD AUTO: 8.4 K/UL (ref 3.5–11)

## 2023-03-07 PROCEDURE — 86225 DNA ANTIBODY NATIVE: CPT

## 2023-03-07 PROCEDURE — 84439 ASSAY OF FREE THYROXINE: CPT

## 2023-03-07 PROCEDURE — 80053 COMPREHEN METABOLIC PANEL: CPT

## 2023-03-07 PROCEDURE — 99214 OFFICE O/P EST MOD 30 MIN: CPT | Performed by: FAMILY MEDICINE

## 2023-03-07 PROCEDURE — 85652 RBC SED RATE AUTOMATED: CPT

## 2023-03-07 PROCEDURE — 84443 ASSAY THYROID STIM HORMONE: CPT

## 2023-03-07 PROCEDURE — 82550 ASSAY OF CK (CPK): CPT

## 2023-03-07 PROCEDURE — 86038 ANTINUCLEAR ANTIBODIES: CPT

## 2023-03-07 PROCEDURE — 83735 ASSAY OF MAGNESIUM: CPT

## 2023-03-07 PROCEDURE — 85025 COMPLETE CBC W/AUTO DIFF WBC: CPT

## 2023-03-07 PROCEDURE — 83036 HEMOGLOBIN GLYCOSYLATED A1C: CPT

## 2023-03-07 PROCEDURE — 80061 LIPID PANEL: CPT

## 2023-03-07 PROCEDURE — 36415 COLL VENOUS BLD VENIPUNCTURE: CPT

## 2023-03-07 NOTE — PROGRESS NOTES
Name: Stanford Lr  : 1970         Chief Complaint:     Chief Complaint   Patient presents with    Leg Pain       History of Present Illness:      Stanford Lr is a 46 y.o.  female who presents with Leg Pain      HPI    Pt c/o generalized muscle weakness, cramping and tremulousness for past few weeks, worsening. Unsteady gait and can also have weakness of upper ext even with applying makeup. Some abnormalities of neck also, wakes up in the morning with head flexed forward and cocked to L, takes a long time to get it to straighten out so she can hold head up.    5 days ago early AM excruciating pain L calf radiating to foot. Stayed sore for a couple days. Some swelling of both legs, worst today. Urine incontinence without warning for past 1-2 mos. Past approx 2 wks has had full incontinence in bed overnight. Has stopped drinking fluids during the day to avoid incontinence. Stools are ok. Medical History:     Patient Active Problem List   Diagnosis    OCD (obsessive compulsive disorder)    Anxiety    Primary fibromyalgia syndrome    Current smoker       Medications:       Prior to Admission medications    Medication Sig Start Date End Date Taking? Authorizing Provider   methylphenidate (RITALIN) 20 MG tablet Take 1 tablet by mouth 2 times daily as needed (difficulty concentrating) for up to 14 days.  Max Daily Amount: 40 mg 2/22/23 3/8/23  Beatriz Bermudez, DO   traZODone (DESYREL) 50 MG tablet Take 1-2 tablets by mouth nightly 23   Beatriz Bermudez, DO   PARoxetine (PAXIL) 20 MG tablet Take 1 tablet by mouth daily 23   Beatriz Bermudez, DO   clonazePAM (KLONOPIN) 0.5 MG tablet TAKE 1 TO 2 TABLETS BY MOUTH EVERY DAY AS NEEDED FOR ANXIETY 23  Beatriz Bermudez, DO   LORazepam (ATIVAN) 1 MG tablet TAKE 1 TABLET BY MOUTH EVERY 8 HOURS AS NEEDED FOR ANXIETY 23  Beatriz Bermudez, DO   EPINEPHrine (EPIPEN 2-JOSE) 0.3 MG/0.3ML SOAJ injection Inject 0.3 mLs into the muscle once for 1 dose Use as directed for allergic reaction 2/13/23 2/13/23  Mey Paredes DO   ibuprofen (ADVIL;MOTRIN) 600 MG tablet Take 1 tablet by mouth 4 times daily as needed for Pain 10/7/20   María Carlos MD   Cholecalciferol (VITAMIN D) 50 MCG (2000 UT) CAPS capsule Take 2,000 Units by mouth daily    Historical Provider, MD        Allergies:       Codeine, Celexa [citalopram], Effexor [venlafaxine], and Xanax [alprazolam]    Physical Exam:     Vitals:  /74   Pulse 96   Ht 5' 6\" (1.676 m)   Wt 150 lb (68 kg)   LMP 04/05/2014   SpO2 99%   BMI 24.21 kg/m²   Physical Exam  Vitals and nursing note reviewed. Constitutional:       General: She is not in acute distress. Appearance: Normal appearance. She is well-developed. She is not ill-appearing. Neck:      Vascular: No carotid bruit. Cardiovascular:      Rate and Rhythm: Normal rate and regular rhythm. Heart sounds: Normal heart sounds. Comments: Trace pitting edema pablito feet. Toes dusky and cool but pablito DP and PT pulses 2+. Pulmonary:      Effort: Pulmonary effort is normal.      Breath sounds: Normal breath sounds. Musculoskeletal:      Comments: L calf tender but no swelling or deformity. Neurological:      Mental Status: She is alert and oriented to person, place, and time. Comments: 5/5 strength pablito upper and lower ext. Unable to stand on heels. Brisk L patellar reflex.    Psychiatric:         Mood and Affect: Mood normal.         Behavior: Behavior normal.       Data:     Lab Results   Component Value Date/Time     03/07/2023 05:46 PM    K 3.6 03/07/2023 05:46 PM     03/07/2023 05:46 PM    CO2 26 03/07/2023 05:46 PM    BUN 14 03/07/2023 05:46 PM    CREATININE 0.86 03/07/2023 05:46 PM    GLUCOSE 91 03/07/2023 05:46 PM    PROT 7.3 03/07/2023 05:46 PM    LABALBU 4.2 03/07/2023 05:46 PM    BILITOT 0.4 03/07/2023 05:46 PM    ALKPHOS 89 03/07/2023 05:46 PM    AST 13 03/07/2023 05:46 PM    ALT 9 03/07/2023 05:46 PM     Lab Results   Component Value Date/Time    WBC 8.4 03/07/2023 05:46 PM    RBC 4.80 03/07/2023 05:46 PM    HGB 14.2 03/07/2023 05:46 PM    HCT 43.1 03/07/2023 05:46 PM    MCV 90.0 03/07/2023 05:46 PM    MCH 29.6 03/07/2023 05:46 PM    MCHC 32.9 03/07/2023 05:46 PM    RDW 12.8 03/07/2023 05:46 PM     03/07/2023 05:46 PM    MPV 8.7 02/03/2021 02:04 PM     Lab Results   Component Value Date/Time    TSH 0.26 03/07/2023 05:46 PM     Lab Results   Component Value Date/Time    CHOL 234 08/17/2020 09:03 AM    HDL 60 08/17/2020 09:03 AM         Assessment & Plan:        Diagnosis Orders   1. Muscle weakness  RUBY Screen with Reflex    Sedimentation Rate    CBC with Auto Differential    TSH with Reflex    Magnesium    CK    MRI BRAIN W WO CONTRAST    MRI CERVICAL SPINE W WO CONTRAST      2. Ataxia  MRI BRAIN W WO CONTRAST    MRI CERVICAL SPINE W WO CONTRAST      3. Hyperreflexia  MRI BRAIN W WO CONTRAST    MRI CERVICAL SPINE W WO CONTRAST      4. Swelling of both lower extremities  RUBY Screen with Reflex    Sedimentation Rate    CBC with Auto Differential    TSH with Reflex    Magnesium      5. Urinary incontinence without sensory awareness  CBC with Auto Differential    MRI BRAIN W WO CONTRAST    MRI CERVICAL SPINE W WO CONTRAST      6. Screening for cardiovascular condition  Comprehensive Metabolic Panel    Lipid Panel      7. Abnormal glucose  Hemoglobin A1C        Concerning onset of neuro symptoms over past couple mos, worsening, worst in lower ext but present in upper also. Worst is LLE which also had a terrible muscle cramp a few days ago. Ddx electrolyte disturbance, demyelinating disorder, myopathy. Urine incontinence without awareness which raises concern for neuro disorder also.  Testing ordered as above.         signed by Brandee Sauer DO on 3/7/2023 at 11:04 PM  [address]  [address] [facility] [address] [address]  Dept: [phone]

## 2023-03-08 ENCOUNTER — HOSPITAL ENCOUNTER (OUTPATIENT)
Dept: MRI IMAGING | Age: 53
Discharge: HOME | End: 2023-03-10
Payer: COMMERCIAL

## 2023-03-08 DIAGNOSIS — R27.0 ATAXIA: ICD-10-CM

## 2023-03-08 DIAGNOSIS — R29.2 HYPERREFLEXIA: ICD-10-CM

## 2023-03-08 DIAGNOSIS — N39.42 URINARY INCONTINENCE WITHOUT SENSORY AWARENESS: ICD-10-CM

## 2023-03-08 DIAGNOSIS — M62.81 MUSCLE WEAKNESS: ICD-10-CM

## 2023-03-08 LAB
CHOLEST SERPL-MCNC: 200 MG/DL
CHOLESTEROL/HDL RATIO: 3.6
EST. AVERAGE GLUCOSE BLD GHB EST-MCNC: 97 MG/DL
HBA1C MFR BLD: 5 % (ref 4–6)
HDLC SERPL-MCNC: 56 MG/DL
LDLC SERPL CALC-MCNC: 116 MG/DL (ref 0–130)
T4 FREE SERPL-MCNC: 1.41 NG/DL (ref 0.93–1.7)
TRIGL SERPL-MCNC: 138 MG/DL

## 2023-03-08 PROCEDURE — A9577 INJ MULTIHANCE: HCPCS | Performed by: FAMILY MEDICINE

## 2023-03-08 PROCEDURE — 72156 MRI NECK SPINE W/O & W/DYE: CPT

## 2023-03-08 PROCEDURE — 6360000004 HC RX CONTRAST MEDICATION: Performed by: FAMILY MEDICINE

## 2023-03-08 PROCEDURE — 70553 MRI BRAIN STEM W/O & W/DYE: CPT

## 2023-03-08 RX ADMIN — GADOBENATE DIMEGLUMINE 14 ML: 529 INJECTION, SOLUTION INTRAVENOUS at 12:40

## 2023-03-09 ENCOUNTER — TELEPHONE (OUTPATIENT)
Dept: FAMILY MEDICINE CLINIC | Age: 53
End: 2023-03-09

## 2023-03-09 DIAGNOSIS — R29.2 HYPERREFLEXIA: ICD-10-CM

## 2023-03-09 DIAGNOSIS — E05.90 HYPERTHYROIDISM: Primary | ICD-10-CM

## 2023-03-09 DIAGNOSIS — E04.2 MULTINODULAR THYROID: ICD-10-CM

## 2023-03-09 DIAGNOSIS — N39.42 URINARY INCONTINENCE WITHOUT SENSORY AWARENESS: ICD-10-CM

## 2023-03-09 DIAGNOSIS — R27.0 ATAXIA: ICD-10-CM

## 2023-03-09 DIAGNOSIS — M62.81 MUSCLE WEAKNESS: ICD-10-CM

## 2023-03-09 NOTE — TELEPHONE ENCOUNTER
----- Message from Heidy Webb DO sent at 3/9/2023  8:08 AM EST -----  Imaging looked normal with regard to brain and spinal cord. However, the thyroid looks enlarged and like he has some nodules. With this and the mild abnormality of thyroid blood work, I do think she may be having a problem there. Please send back to me and I will order more thyroid labs and ultrasound. I am still not convinced this explains her symptoms so I do recommend neurology referral also.

## 2023-03-10 LAB
ANA SER QL IA: NEGATIVE
DSDNA IGG SER QL IA: 1 IU/ML
NUCLEAR IGG SER IA-RTO: 0.3 U/ML

## 2023-03-11 ENCOUNTER — HOSPITAL ENCOUNTER (OUTPATIENT)
Age: 53
Discharge: HOME | End: 2023-03-11
Payer: COMMERCIAL

## 2023-03-11 DIAGNOSIS — E05.90 HYPERTHYROIDISM: ICD-10-CM

## 2023-03-11 DIAGNOSIS — E04.2 MULTINODULAR THYROID: ICD-10-CM

## 2023-03-11 LAB — T3FREE SERPL-MCNC: 2.67 PG/ML (ref 2.02–4.43)

## 2023-03-11 PROCEDURE — 84445 ASSAY OF TSI GLOBULIN: CPT

## 2023-03-11 PROCEDURE — 36415 COLL VENOUS BLD VENIPUNCTURE: CPT

## 2023-03-11 PROCEDURE — 84481 FREE ASSAY (FT-3): CPT

## 2023-03-11 PROCEDURE — 84482 T3 REVERSE: CPT

## 2023-03-13 ENCOUNTER — TELEPHONE (OUTPATIENT)
Dept: FAMILY MEDICINE CLINIC | Age: 53
End: 2023-03-13

## 2023-03-13 DIAGNOSIS — E04.2 MULTINODULAR THYROID: Primary | ICD-10-CM

## 2023-03-13 NOTE — TELEPHONE ENCOUNTER
Thyroid imaging. She was told that she can not do the test ordered for at least 6 weeks due to having contrast for ct scan and these can only done in [facility] or [facility]". Please advise            Last visit:  3/7/2023  Next Visit Date:  No future appointments. Medication List:  Prior to Admission medications    Medication Sig Start Date End Date Taking?  Authorizing Provider   traZODone (DESYREL) 50 MG tablet Take 1-2 tablets by mouth nightly 2/22/23   Lopez Orn, DO   PARoxetine (PAXIL) 20 MG tablet Take 1 tablet by mouth daily 2/13/23   Lopez Orn, DO   clonazePAM (KLONOPIN) 0.5 MG tablet TAKE 1 TO 2 TABLETS BY MOUTH EVERY DAY AS NEEDED FOR ANXIETY 2/13/23 5/13/23  Lopez Orn, DO   LORazepam (ATIVAN) 1 MG tablet TAKE 1 TABLET BY MOUTH EVERY 8 HOURS AS NEEDED FOR ANXIETY 2/13/23 5/13/23  Lopez Orn, DO   EPINEPHrine (EPIPEN 2-JOSE) 0.3 MG/0.3ML SOAJ injection Inject 0.3 mLs into the muscle once for 1 dose Use as directed for allergic reaction 2/13/23 2/13/23  Lopez Orn, DO   ibuprofen (ADVIL;MOTRIN) 600 MG tablet Take 1 tablet by mouth 4 times daily as needed for Pain 10/7/20   Araceli Rodríguez MD   Cholecalciferol (VITAMIN D) 50 MCG (2000 UT) CAPS capsule Take 2,000 Units by mouth daily    Historical Provider, MD

## 2023-03-20 ENCOUNTER — HOSPITAL ENCOUNTER (OUTPATIENT)
Dept: ULTRASOUND IMAGING | Age: 53
Discharge: HOME | End: 2023-03-22
Payer: COMMERCIAL

## 2023-03-20 DIAGNOSIS — E04.2 MULTINODULAR THYROID: ICD-10-CM

## 2023-03-20 PROCEDURE — 76536 US EXAM OF HEAD AND NECK: CPT

## 2023-03-21 DIAGNOSIS — E05.90 HYPERTHYROIDISM: Primary | ICD-10-CM

## 2023-04-17 ENCOUNTER — HOSPITAL ENCOUNTER (OUTPATIENT)
Age: 53
Discharge: HOME | End: 2023-04-17
Payer: COMMERCIAL

## 2023-04-17 DIAGNOSIS — E05.90 HYPERTHYROIDISM: ICD-10-CM

## 2023-04-17 LAB
25(OH)D3 SERPL-MCNC: 22 NG/ML
CK SERPL-CCNC: 48 U/L (ref 26–192)
FOLATE SERPL-MCNC: 10.7 NG/ML
T4 FREE SERPL-MCNC: 1.4 NG/DL (ref 0.9–1.7)
TSH SERPL-ACNC: 0.2 UIU/ML (ref 0.3–5)
VIT B12 SERPL-MCNC: 407 PG/ML (ref 232–1245)

## 2023-04-17 PROCEDURE — 82607 VITAMIN B-12: CPT

## 2023-04-17 PROCEDURE — 82300 ASSAY OF CADMIUM: CPT

## 2023-04-17 PROCEDURE — 83655 ASSAY OF LEAD: CPT

## 2023-04-17 PROCEDURE — 84443 ASSAY THYROID STIM HORMONE: CPT

## 2023-04-17 PROCEDURE — 82306 VITAMIN D 25 HYDROXY: CPT

## 2023-04-17 PROCEDURE — 36415 COLL VENOUS BLD VENIPUNCTURE: CPT

## 2023-04-17 PROCEDURE — 82550 ASSAY OF CK (CPK): CPT

## 2023-04-17 PROCEDURE — 82746 ASSAY OF FOLIC ACID SERUM: CPT

## 2023-04-17 PROCEDURE — 86617 LYME DISEASE ANTIBODY: CPT

## 2023-04-17 PROCEDURE — 82175 ASSAY OF ARSENIC: CPT

## 2023-04-17 PROCEDURE — 84439 ASSAY OF FREE THYROXINE: CPT

## 2023-04-17 PROCEDURE — 83825 ASSAY OF MERCURY: CPT

## 2023-04-19 LAB
ARSENIC, BLOOD: <10 UG/L
CADMIUM: <1 UG/L
LEAD RBC-MCNC: 1 UG/DL (ref 0–4)
MERCURY, BLOOD: <2.5 UG/L

## 2023-04-20 LAB
LYME IGM WB: NEGATIVE
LYME WESTERN BLOT IGG: NEGATIVE

## 2023-05-15 DIAGNOSIS — F41.9 ANXIETY: ICD-10-CM

## 2023-05-15 RX ORDER — LORAZEPAM 1 MG/1
TABLET ORAL
Qty: 60 TABLET | Refills: 2 | Status: SHIPPED | OUTPATIENT
Start: 2023-05-15 | End: 2023-08-15

## 2023-05-15 NOTE — TELEPHONE ENCOUNTER
Last visit:  3/7/2023  Next Visit Date:    Future Appointments   Date Time Provider Dima Elaine   5/19/2023  9:20 AM DORA Silverman CNP [facility]         Medication List:  Prior to Admission medications    Medication Sig Start Date End Date Taking?  Authorizing Provider   traZODone (DESYREL) 50 MG tablet Take 1-2 tablets by mouth nightly 2/22/23   Melony Hidalgo, DO   PARoxetine (PAXIL) 20 MG tablet Take 1 tablet by mouth daily 2/13/23   Melony Hidalgo, DO   clonazePAM (KLONOPIN) 0.5 MG tablet TAKE 1 TO 2 TABLETS BY MOUTH EVERY DAY AS NEEDED FOR ANXIETY 2/13/23 5/13/23  Melony Hidalgo DO   EPINEPHrine (EPIPEN 2-JOSE) 0.3 MG/0.3ML SOAJ injection Inject 0.3 mLs into the muscle once for 1 dose Use as directed for allergic reaction 2/13/23 2/13/23  Melony Hidalgo DO   ibuprofen (ADVIL;MOTRIN) 600 MG tablet Take 1 tablet by mouth 4 times daily as needed for Pain 10/7/20   Ventura Call MD   Cholecalciferol (VITAMIN D) 50 MCG (2000 UT) CAPS capsule Take 2,000 Units by mouth daily    Historical Provider, MD

## 2023-05-19 ENCOUNTER — HOSPITAL ENCOUNTER (OUTPATIENT)
Age: 53
Discharge: HOME | End: 2023-05-19
Payer: COMMERCIAL

## 2023-05-19 ENCOUNTER — OFFICE VISIT (OUTPATIENT)
Dept: FAMILY MEDICINE CLINIC | Age: 53
End: 2023-05-19
Payer: COMMERCIAL

## 2023-05-19 VITALS
WEIGHT: 137 LBS | OXYGEN SATURATION: 98 % | SYSTOLIC BLOOD PRESSURE: 110 MMHG | BODY MASS INDEX: 22.11 KG/M2 | DIASTOLIC BLOOD PRESSURE: 72 MMHG | HEART RATE: 78 BPM

## 2023-05-19 DIAGNOSIS — Z11.4 SCREENING FOR HIV (HUMAN IMMUNODEFICIENCY VIRUS): ICD-10-CM

## 2023-05-19 DIAGNOSIS — R59.1 LYMPHADENOPATHY: ICD-10-CM

## 2023-05-19 DIAGNOSIS — Z11.59 NEED FOR HEPATITIS C SCREENING TEST: ICD-10-CM

## 2023-05-19 DIAGNOSIS — R59.1 LYMPHADENOPATHY: Primary | ICD-10-CM

## 2023-05-19 DIAGNOSIS — E55.9 VITAMIN D DEFICIENCY: ICD-10-CM

## 2023-05-19 LAB
ALBUMIN SERPL-MCNC: 4.5 G/DL (ref 3.5–5.2)
ALP SERPL-CCNC: 82 U/L (ref 35–104)
ALT SERPL-CCNC: 9 U/L (ref 5–33)
ANION GAP SERPL CALCULATED.3IONS-SCNC: 9 MMOL/L (ref 9–17)
AST SERPL-CCNC: 15 U/L
BASOPHILS # BLD: 0 K/UL (ref 0–0.2)
BASOPHILS NFR BLD: 0 % (ref 0–2)
BILIRUB SERPL-MCNC: 0.8 MG/DL (ref 0.3–1.2)
BUN SERPL-MCNC: 8 MG/DL (ref 6–20)
BUN/CREAT SERPL: 10 (ref 9–20)
CALCIUM SERPL-MCNC: 9.8 MG/DL (ref 8.6–10.4)
CHLORIDE SERPL-SCNC: 99 MMOL/L (ref 98–107)
CO2 SERPL-SCNC: 26 MMOL/L (ref 20–31)
CREAT SERPL-MCNC: 0.78 MG/DL (ref 0.5–0.9)
DIFFERENTIAL TYPE: YES
EOSINOPHIL # BLD: 0.1 K/UL (ref 0–0.4)
EOSINOPHILS RELATIVE PERCENT: 2 % (ref 0–5)
ERYTHROCYTE [DISTWIDTH] IN BLOOD BY AUTOMATED COUNT: 12.7 % (ref 12.1–15.2)
GFR SERPL CREATININE-BSD FRML MDRD: >60 ML/MIN/1.73M2
GLUCOSE SERPL-MCNC: 102 MG/DL (ref 70–99)
HCT VFR BLD AUTO: 44.8 % (ref 36–46)
HGB BLD-MCNC: 15.2 G/DL (ref 12–16)
HIV 1+2 AB+HIV1 P24 AG SERPL QL IA: NONREACTIVE
LYMPHOCYTES # BLD: 28 % (ref 15–40)
LYMPHOCYTES NFR BLD: 2 K/UL (ref 1–4.8)
MCH RBC QN AUTO: 29.9 PG (ref 26–34)
MCHC RBC AUTO-ENTMCNC: 34 G/DL (ref 31–37)
MCV RBC AUTO: 88 FL (ref 80–100)
MONOCYTES NFR BLD: 0.3 K/UL (ref 0–1)
MONOCYTES NFR BLD: 4 % (ref 4–8)
NEUTROPHILS NFR BLD: 66 % (ref 47–75)
NEUTS SEG NFR BLD: 4.6 K/UL (ref 2.5–7)
PLATELET # BLD AUTO: 368 K/UL (ref 140–450)
POTASSIUM SERPL-SCNC: 4.7 MMOL/L (ref 3.7–5.3)
PROT SERPL-MCNC: 7.6 G/DL (ref 6.4–8.3)
RBC # BLD AUTO: 5.09 M/UL (ref 4–5.2)
SODIUM SERPL-SCNC: 134 MMOL/L (ref 135–144)
WBC OTHER # BLD: 7 K/UL (ref 3.5–11)

## 2023-05-19 PROCEDURE — 36415 COLL VENOUS BLD VENIPUNCTURE: CPT

## 2023-05-19 PROCEDURE — 80053 COMPREHEN METABOLIC PANEL: CPT

## 2023-05-19 PROCEDURE — 99213 OFFICE O/P EST LOW 20 MIN: CPT | Performed by: NURSE PRACTITIONER

## 2023-05-19 PROCEDURE — 85025 COMPLETE CBC W/AUTO DIFF WBC: CPT

## 2023-05-19 PROCEDURE — G0472 HEP C SCREEN HIGH RISK/OTHER: HCPCS | Performed by: NURSE PRACTITIONER

## 2023-05-19 PROCEDURE — 87389 HIV-1 AG W/HIV-1&-2 AB AG IA: CPT

## 2023-05-19 ASSESSMENT — ENCOUNTER SYMPTOMS
COUGH: 0
VOMITING: 0
SHORTNESS OF BREATH: 0
DIARRHEA: 0
NAUSEA: 0

## 2023-05-19 NOTE — PROGRESS NOTES
HPI Notes    Name: Sacha Sky  : 1970         Chief Complaint:     Chief Complaint   Patient presents with    Weight Loss     Patient here today for weight loss. She has a good appetite. Lymphadenopathy     Patient here today with swollen glands in neck, wonders about thyroid? History of Present Illness:        HPI  Patient is a 55-year-old female who reports for complaints of unintentional weight loss. Patient is lost about 13 pounds in the last 6 weeks. Patient states she has a good appetite and she does eat regularly. Also complains of swollen glands in her neck. Patient states that the glands under her jaw are tender to touch and larger than usual.  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 Loida Tripp @ [facility]--Medical therapy    LAPAROSCOPY      WRIST FRACTURE SURGERY Right 2021    per Dr. Brina Sifuentes Right 2021    WRIST OPEN REDUCTION INTERNAL FIXATION-DISTAL RADIUS performed by Leida Nunez MD at [address]        Medications:       Prior to Admission medications    Medication Sig Start Date End Date Taking?  Authorizing Provider   LORazepam (ATIVAN) 1 MG tablet TAKE 1 TABLET BY MOUTH EVERY 8 HOURS AS NEEDED FOR ANXIETY 5/15/23 8/15/23 Yes Nisha Araiza,    traZODone (DESYREL) 50 MG tablet Take 1-2 tablets by mouth

## 2023-07-12 DIAGNOSIS — F41.9 ANXIETY: ICD-10-CM

## 2023-07-12 NOTE — TELEPHONE ENCOUNTER
Last OV: 5/19/2023  chronic 02/13/23 anxiety   Last RX:    Next scheduled apt: Visit date not found          surescript requesting a refill

## 2023-07-13 RX ORDER — LORAZEPAM 1 MG/1
TABLET ORAL
Qty: 60 TABLET | Refills: 2 | Status: SHIPPED | OUTPATIENT
Start: 2023-07-13 | End: 2023-10-13

## 2023-08-15 ENCOUNTER — TELEPHONE (OUTPATIENT)
Dept: FAMILY MEDICINE CLINIC | Age: 53
End: 2023-08-15

## 2023-08-15 RX ORDER — QUETIAPINE FUMARATE 50 MG/1
TABLET, FILM COATED ORAL
Qty: 135 TABLET | Refills: 1 | Status: SHIPPED | OUTPATIENT
Start: 2023-08-15

## 2023-08-15 NOTE — TELEPHONE ENCOUNTER
Imani Diaz thinks she is allergic to the trazodone. She said every evening she coughs up a lung. She did not take it for one night and she did not cough. She was wanting to know if she can go back on the Seroquel. Please let Alberta know. Health Maintenance   Topic Date Due    Pneumococcal 0-64 years Vaccine (1 - PCV) Never done    Hepatitis C screen  Never done    Cervical cancer screen  Never done    Colorectal Cancer Screen  Never done    Shingles vaccine (1 of 2) Never done    COVID-19 Vaccine (4 - Booster for [employer] series) 01/18/2022    Flu vaccine (1) 08/01/2023    Depression Screen  02/13/2024    Breast cancer screen  02/23/2025    Lipids  03/07/2028    DTaP/Tdap/Td vaccine (2 - Td or Tdap) 10/07/2030    HIV screen  Completed    Hepatitis A vaccine  Aged Out    Hib vaccine  Aged Out    Meningococcal (ACWY) vaccine  Aged Out             (applicable per patient's age: Cancer Screenings, Depression Screening, Fall Risk Screening, Immunizations)    Hemoglobin A1C (%)   Date Value   03/07/2023 5.0     LDL Cholesterol (mg/dL)   Date Value   03/07/2023 116     AST (U/L)   Date Value   05/19/2023 15     ALT (U/L)   Date Value   05/19/2023 9     BUN (mg/dL)   Date Value   05/19/2023 8      (goal A1C is < 7)   (goal LDL is <100) need 30-50% reduction from baseline     BP Readings from Last 3 Encounters:   05/19/23 110/72   03/07/23 118/74   02/13/23 120/78    (goal /80)      All Future Testing planned in [facility]:  Lab Frequency Next Occurrence   NM THYROID UPTAKE ONLY Once 03/10/2023       Next Visit Date:  No future appointments.          Patient Active Problem List:     OCD (obsessive compulsive disorder)     Anxiety     Primary fibromyalgia syndrome     Current smoker

## 2023-08-15 NOTE — TELEPHONE ENCOUNTER
Last visit:  5/19/2023  Next Visit Date:  No future appointments. Medication List:  Prior to Admission medications    Medication Sig Start Date End Date Taking?  Authorizing Provider   LORazepam (ATIVAN) 1 MG tablet TAKE 1 TABLET BY MOUTH EVERY 8 HOURS AS NEEDED FOR ANXIETY 7/13/23 10/13/23  DORA Quintanilla - CNP   traZODone (DESYREL) 50 MG tablet Take 1-2 tablets by mouth nightly 2/22/23   Selina Arvizu DO   PARoxetine (PAXIL) 20 MG tablet Take 1 tablet by mouth daily 2/13/23   Selina Arvizu DO   EPINEPHrine (EPIPEN 2-JOSE) 0.3 MG/0.3ML SOAJ injection Inject 0.3 mLs into the muscle once for 1 dose Use as directed for allergic reaction 2/13/23 2/13/23  Selina Arvizu DO   ibuprofen (ADVIL;MOTRIN) 600 MG tablet Take 1 tablet by mouth 4 times daily as needed for Pain 10/7/20   Josy Carias MD

## 2023-08-15 NOTE — TELEPHONE ENCOUNTER
Rx sent for seroquel and removed trazodone from med list. Cough not a typical side effect so if it recurs even without the trazodone she'll need to be seen.

## 2023-09-18 RX ORDER — PAROXETINE HYDROCHLORIDE 20 MG/1
20 TABLET, FILM COATED ORAL DAILY
Qty: 90 TABLET | Refills: 1 | Status: SHIPPED | OUTPATIENT
Start: 2023-09-18

## 2023-09-18 NOTE — TELEPHONE ENCOUNTER
----- Message from Rosa Jimenez sent at 9/18/2023  1:10 PM EDT -----  Subject: Refill Request    QUESTIONS  Name of Medication? PARoxetine (PAXIL) 20 MG tablet  Patient-reported dosage and instructions? 20 MG 1 daily   How many days do you have left? 1  Preferred Pharmacy? [id number]  Pharmacy phone number (if available)? [phone]  ---------------------------------------------------------------------------  --------------  CALL BACK INFO  What is the best way for the office to contact you? OK to leave message on   voicemail  Preferred Call Back Phone Number? [phone]  ---------------------------------------------------------------------------  --------------  SCRIPT ANSWERS  Relationship to Patient?  Self

## 2023-09-18 NOTE — TELEPHONE ENCOUNTER
Last OV: 5/19/2023 03/13/23 chronic anxiety   Last RX:    Next scheduled apt: 9/22/2023   6 months f/u           Pt requesting a refill

## 2023-09-22 ENCOUNTER — HOSPITAL ENCOUNTER (OUTPATIENT)
Age: 53
Discharge: HOME | End: 2023-09-22
Payer: COMMERCIAL

## 2023-09-22 ENCOUNTER — OFFICE VISIT (OUTPATIENT)
Dept: FAMILY MEDICINE CLINIC | Age: 53
End: 2023-09-22
Payer: COMMERCIAL

## 2023-09-22 VITALS
HEIGHT: 66 IN | OXYGEN SATURATION: 99 % | BODY MASS INDEX: 21.53 KG/M2 | HEART RATE: 83 BPM | WEIGHT: 134 LBS | SYSTOLIC BLOOD PRESSURE: 130 MMHG | DIASTOLIC BLOOD PRESSURE: 80 MMHG

## 2023-09-22 DIAGNOSIS — K59.09 CHRONIC CONSTIPATION: ICD-10-CM

## 2023-09-22 DIAGNOSIS — R63.4 UNINTENDED WEIGHT LOSS: ICD-10-CM

## 2023-09-22 DIAGNOSIS — R79.89 LOW TSH LEVEL: ICD-10-CM

## 2023-09-22 DIAGNOSIS — Z12.11 ENCOUNTER FOR COLORECTAL CANCER SCREENING: ICD-10-CM

## 2023-09-22 DIAGNOSIS — Z12.12 ENCOUNTER FOR COLORECTAL CANCER SCREENING: ICD-10-CM

## 2023-09-22 DIAGNOSIS — F98.8 ATTENTION DEFICIT DISORDER (ADD) IN ADULT: ICD-10-CM

## 2023-09-22 DIAGNOSIS — R10.31 RLQ ABDOMINAL PAIN: Primary | ICD-10-CM

## 2023-09-22 PROBLEM — E05.90 HYPERTHYROIDISM: Status: ACTIVE | Noted: 2023-09-22

## 2023-09-22 LAB
ALBUMIN SERPL-MCNC: 4.5 G/DL (ref 3.5–5.2)
ALP SERPL-CCNC: 104 U/L (ref 35–104)
ALT SERPL-CCNC: 14 U/L (ref 5–33)
ANION GAP SERPL CALCULATED.3IONS-SCNC: 11 MMOL/L (ref 9–17)
AST SERPL-CCNC: 21 U/L
BASOPHILS # BLD: 0.02 K/UL (ref 0–0.2)
BASOPHILS NFR BLD: 0 % (ref 0–2)
BILIRUB SERPL-MCNC: 0.4 MG/DL (ref 0.3–1.2)
BUN SERPL-MCNC: 12 MG/DL (ref 6–20)
BUN/CREAT SERPL: 15 (ref 9–20)
CALCIUM SERPL-MCNC: 9.5 MG/DL (ref 8.6–10.4)
CHLORIDE SERPL-SCNC: 99 MMOL/L (ref 98–107)
CO2 SERPL-SCNC: 25 MMOL/L (ref 20–31)
CREAT SERPL-MCNC: 0.8 MG/DL (ref 0.5–0.9)
EOSINOPHIL # BLD: 0.13 K/UL (ref 0–0.4)
EOSINOPHILS RELATIVE PERCENT: 2 % (ref 0–5)
ERYTHROCYTE [DISTWIDTH] IN BLOOD BY AUTOMATED COUNT: 11.8 % (ref 12.1–15.2)
GFR SERPL CREATININE-BSD FRML MDRD: >60 ML/MIN/1.73M2
GLUCOSE SERPL-MCNC: 104 MG/DL (ref 70–99)
HCT VFR BLD AUTO: 42.8 % (ref 36–46)
HGB BLD-MCNC: 14.4 G/DL (ref 12–16)
IMM GRANULOCYTES # BLD AUTO: 0.02 K/UL (ref 0–0.3)
IMM GRANULOCYTES NFR BLD: 0 % (ref 0–5)
LYMPHOCYTES NFR BLD: 2.19 K/UL (ref 1–4.8)
LYMPHOCYTES RELATIVE PERCENT: 25 % (ref 15–40)
MCH RBC QN AUTO: 29.4 PG (ref 26–34)
MCHC RBC AUTO-ENTMCNC: 33.6 G/DL (ref 31–37)
MCV RBC AUTO: 87.3 FL (ref 80–100)
MONOCYTES NFR BLD: 0.41 K/UL (ref 0–1)
MONOCYTES NFR BLD: 5 % (ref 4–8)
NEUTROPHILS NFR BLD: 68 % (ref 47–75)
NEUTS SEG NFR BLD: 5.93 K/UL (ref 2.5–7)
PLATELET # BLD AUTO: 341 K/UL (ref 140–450)
PMV BLD AUTO: 8.7 FL (ref 6–12)
POTASSIUM SERPL-SCNC: 4.2 MMOL/L (ref 3.7–5.3)
PROT SERPL-MCNC: 7.5 G/DL (ref 6.4–8.3)
RBC # BLD AUTO: 4.9 M/UL (ref 4–5.2)
SODIUM SERPL-SCNC: 135 MMOL/L (ref 135–144)
T4 FREE SERPL-MCNC: 1.1 NG/DL (ref 0.9–1.7)
TSH SERPL DL<=0.05 MIU/L-ACNC: 0.26 UIU/ML (ref 0.3–5)
WBC OTHER # BLD: 8.7 K/UL (ref 3.5–11)

## 2023-09-22 PROCEDURE — 99214 OFFICE O/P EST MOD 30 MIN: CPT | Performed by: FAMILY MEDICINE

## 2023-09-22 PROCEDURE — 85025 COMPLETE CBC W/AUTO DIFF WBC: CPT

## 2023-09-22 PROCEDURE — 36415 COLL VENOUS BLD VENIPUNCTURE: CPT

## 2023-09-22 PROCEDURE — 80053 COMPREHEN METABOLIC PANEL: CPT

## 2023-09-22 PROCEDURE — 84439 ASSAY OF FREE THYROXINE: CPT

## 2023-09-22 PROCEDURE — 84443 ASSAY THYROID STIM HORMONE: CPT

## 2023-09-22 RX ORDER — DEXTROAMPHETAMINE SACCHARATE, AMPHETAMINE ASPARTATE, DEXTROAMPHETAMINE SULFATE AND AMPHETAMINE SULFATE 2.5; 2.5; 2.5; 2.5 MG/1; MG/1; MG/1; MG/1
10 TABLET ORAL 2 TIMES DAILY
Qty: 30 TABLET | Refills: 0 | Status: SHIPPED | OUTPATIENT
Start: 2023-09-22 | End: 2023-10-07

## 2023-09-22 RX ORDER — POLYETHYLENE GLYCOL 3350 17 G/17G
17 POWDER, FOR SOLUTION ORAL DAILY
Qty: 510 G | Refills: 2 | Status: SHIPPED | OUTPATIENT
Start: 2023-09-22

## 2023-09-22 ASSESSMENT — PATIENT HEALTH QUESTIONNAIRE - PHQ9
SUM OF ALL RESPONSES TO PHQ QUESTIONS 1-9: 0
SUM OF ALL RESPONSES TO PHQ9 QUESTIONS 1 & 2: 0
1. LITTLE INTEREST OR PLEASURE IN DOING THINGS: 0
2. FEELING DOWN, DEPRESSED OR HOPELESS: 0
SUM OF ALL RESPONSES TO PHQ QUESTIONS 1-9: 0

## 2023-09-22 NOTE — PATIENT INSTRUCTIONS
[employer] SURVEY:    You may be receiving a survey from [employer] regarding your visit today. You may get this in the mail, through your MyChart or in your email. Please complete the survey to enable us to provide the highest quality of care to you and your family. If you cannot score us as very good ( 5 Stars) on any question, please feel free to call the office to discuss how we could have made your experience exceptional.     Thank you.     Clinical Care Team:   Dr. Luz Acevedo, [address] [facility], [address]                                     Triage: Rodo Velarde, [address] [facility] Team:    [address] [facility]

## 2023-09-22 NOTE — PROGRESS NOTES
Name: Vane Whitaker  : 1970         Chief Complaint:     Chief Complaint   Patient presents with    ADD    Anxiety    Abdominal Pain     RLQ, sharp       History of Present Illness:      Vane Whitaker is a 48 y.o.  female who presents with ADD, Anxiety, and Abdominal Pain (RLQ, sharp)      HPI    Has felt unwell all summer. About a month ago started having intermittent sharp pains in RLQ, can be down towards pubic bone and also over farther laterally. BM's normal for her and no big changes, has one every 2-7 days. Currently it's been about 5 days so she drank prune juice last night, no effect yet. For years took senna but stopped and now just uses the prune juice. Swollen lymph nodes in neck continue though not as large as they were. Had lost a lot of weight, down to 123# at one point, started adding more calories to maintain. Still feels very weak at times but tremors subsided - feels it was r/t weaning klonopin. Remains on paxil 20 mg daily and seroquel 50-75 mg nightly. Ativan 2 mg nightly. Still waking up a lot overnight but no major changes. Feels ok during the day, feels inc dose of paxil helped. ADD - had trouble regulating how she felt on ritalin, regardless of dose couldn't focus on computer screen though on 10 mg she was able to find words more easily when speaking. Medical History:     Patient Active Problem List   Diagnosis    OCD (obsessive compulsive disorder)    Anxiety    Primary fibromyalgia syndrome    Current smoker       Medications:       Prior to Admission medications    Medication Sig Start Date End Date Taking? Authorizing Provider   polyethylene glycol (GLYCOLAX) 17 GM/SCOOP powder Take 17 g by mouth daily 23  Yes Tyree Azar,    amphetamine-dextroamphetamine (ADDERALL, 10MG,) 10 MG tablet Take 1 tablet by mouth 2 times daily for 15 days.  Max Daily Amount: 20 mg 9/22/23 10/7/23 Yes Tyree Azar,    PARoxetine (PAXIL) 20 MG tablet Take 1 tablet by

## 2023-09-25 ENCOUNTER — TELEPHONE (OUTPATIENT)
Dept: FAMILY MEDICINE CLINIC | Age: 53
End: 2023-09-25

## 2023-09-25 ENCOUNTER — APPOINTMENT (OUTPATIENT)
Dept: GENERAL RADIOLOGY | Age: 53
End: 2023-09-25
Payer: COMMERCIAL

## 2023-09-25 ENCOUNTER — HOSPITAL ENCOUNTER (EMERGENCY)
Age: 53
Discharge: HOME | End: 2023-09-25
Attending: EMERGENCY MEDICINE
Payer: COMMERCIAL

## 2023-09-25 VITALS
BODY MASS INDEX: 20.89 KG/M2 | RESPIRATION RATE: 20 BRPM | SYSTOLIC BLOOD PRESSURE: 137 MMHG | TEMPERATURE: 99 F | HEIGHT: 66 IN | HEART RATE: 91 BPM | DIASTOLIC BLOOD PRESSURE: 88 MMHG | OXYGEN SATURATION: 94 % | WEIGHT: 130 LBS

## 2023-09-25 DIAGNOSIS — J40 BRONCHITIS: Primary | ICD-10-CM

## 2023-09-25 LAB
ALBUMIN SERPL-MCNC: 4.2 G/DL (ref 3.5–5.2)
ALP SERPL-CCNC: 117 U/L (ref 35–104)
ALT SERPL-CCNC: 11 U/L (ref 5–33)
ANION GAP SERPL CALCULATED.3IONS-SCNC: 11 MMOL/L (ref 9–17)
AST SERPL-CCNC: 17 U/L
BASOPHILS # BLD: 0.02 K/UL (ref 0–0.2)
BASOPHILS NFR BLD: 0 % (ref 0–2)
BILIRUB SERPL-MCNC: 0.3 MG/DL (ref 0.3–1.2)
BUN SERPL-MCNC: 9 MG/DL (ref 6–20)
BUN/CREAT SERPL: 11 (ref 9–20)
CALCIUM SERPL-MCNC: 9.7 MG/DL (ref 8.6–10.4)
CHLORIDE SERPL-SCNC: 100 MMOL/L (ref 98–107)
CO2 SERPL-SCNC: 25 MMOL/L (ref 20–31)
CREAT SERPL-MCNC: 0.8 MG/DL (ref 0.5–0.9)
EOSINOPHIL # BLD: 0.22 K/UL (ref 0–0.4)
EOSINOPHILS RELATIVE PERCENT: 3 % (ref 0–5)
ERYTHROCYTE [DISTWIDTH] IN BLOOD BY AUTOMATED COUNT: 11.7 % (ref 12.1–15.2)
GFR SERPL CREATININE-BSD FRML MDRD: >60 ML/MIN/1.73M2
GLUCOSE SERPL-MCNC: 119 MG/DL (ref 70–99)
HCT VFR BLD AUTO: 42.4 % (ref 36–46)
HGB BLD-MCNC: 14.3 G/DL (ref 12–16)
IMM GRANULOCYTES # BLD AUTO: 0.01 K/UL (ref 0–0.3)
IMM GRANULOCYTES NFR BLD: 0 % (ref 0–5)
LYMPHOCYTES NFR BLD: 2 K/UL (ref 1–4.8)
LYMPHOCYTES RELATIVE PERCENT: 25 % (ref 15–40)
MCH RBC QN AUTO: 29.1 PG (ref 26–34)
MCHC RBC AUTO-ENTMCNC: 33.7 G/DL (ref 31–37)
MCV RBC AUTO: 86.2 FL (ref 80–100)
MONOCYTES NFR BLD: 0.62 K/UL (ref 0–1)
MONOCYTES NFR BLD: 8 % (ref 4–8)
NEUTROPHILS NFR BLD: 65 % (ref 47–75)
NEUTS SEG NFR BLD: 5.2 K/UL (ref 2.5–7)
PLATELET # BLD AUTO: 301 K/UL (ref 140–450)
PMV BLD AUTO: 8.6 FL (ref 6–12)
POTASSIUM SERPL-SCNC: 3.7 MMOL/L (ref 3.7–5.3)
PROT SERPL-MCNC: 7.3 G/DL (ref 6.4–8.3)
RBC # BLD AUTO: 4.92 M/UL (ref 4–5.2)
SODIUM SERPL-SCNC: 136 MMOL/L (ref 135–144)
TROPONIN I SERPL HS-MCNC: <6 NG/L (ref 0–14)
WBC OTHER # BLD: 8.1 K/UL (ref 3.5–11)

## 2023-09-25 PROCEDURE — 6370000000 HC RX 637 (ALT 250 FOR IP): Performed by: EMERGENCY MEDICINE

## 2023-09-25 PROCEDURE — 99285 EMERGENCY DEPT VISIT HI MDM: CPT

## 2023-09-25 PROCEDURE — 85025 COMPLETE CBC W/AUTO DIFF WBC: CPT

## 2023-09-25 PROCEDURE — 94640 AIRWAY INHALATION TREATMENT: CPT

## 2023-09-25 PROCEDURE — 93005 ELECTROCARDIOGRAM TRACING: CPT | Performed by: EMERGENCY MEDICINE

## 2023-09-25 PROCEDURE — 84484 ASSAY OF TROPONIN QUANT: CPT

## 2023-09-25 PROCEDURE — 36415 COLL VENOUS BLD VENIPUNCTURE: CPT

## 2023-09-25 PROCEDURE — 71046 X-RAY EXAM CHEST 2 VIEWS: CPT

## 2023-09-25 PROCEDURE — 80053 COMPREHEN METABOLIC PANEL: CPT

## 2023-09-25 RX ORDER — IPRATROPIUM BROMIDE AND ALBUTEROL SULFATE 2.5; .5 MG/3ML; MG/3ML
1 SOLUTION RESPIRATORY (INHALATION) ONCE
Status: COMPLETED | OUTPATIENT
Start: 2023-09-25 | End: 2023-09-25

## 2023-09-25 RX ORDER — BROMPHENIRAMINE MALEATE, PSEUDOEPHEDRINE HYDROCHLORIDE, AND DEXTROMETHORPHAN HYDROBROMIDE 2; 30; 10 MG/5ML; MG/5ML; MG/5ML
2.5 SYRUP ORAL 4 TIMES DAILY PRN
Qty: 60 ML | Refills: 0 | Status: SHIPPED | OUTPATIENT
Start: 2023-09-25

## 2023-09-25 RX ORDER — ALBUTEROL SULFATE 90 UG/1
2 AEROSOL, METERED RESPIRATORY (INHALATION) EVERY 6 HOURS PRN
Qty: 54 G | Refills: 1 | Status: SHIPPED | OUTPATIENT
Start: 2023-09-25

## 2023-09-25 RX ADMIN — IPRATROPIUM BROMIDE AND ALBUTEROL SULFATE 1 DOSE: .5; 3 SOLUTION RESPIRATORY (INHALATION) at 18:14

## 2023-09-25 ASSESSMENT — LIFESTYLE VARIABLES
HOW OFTEN DO YOU HAVE A DRINK CONTAINING ALCOHOL: NEVER
HOW MANY STANDARD DRINKS CONTAINING ALCOHOL DO YOU HAVE ON A TYPICAL DAY: PATIENT DOES NOT DRINK

## 2023-09-25 ASSESSMENT — PAIN SCALES - GENERAL: PAINLEVEL_OUTOF10: 2

## 2023-09-25 ASSESSMENT — PAIN - FUNCTIONAL ASSESSMENT: PAIN_FUNCTIONAL_ASSESSMENT: 0-10

## 2023-09-25 ASSESSMENT — PAIN DESCRIPTION - LOCATION: LOCATION: CHEST

## 2023-09-25 NOTE — DISCHARGE INSTRUCTIONS
Use inhaler as needed. Use cough medicine as needed. Follow-up with family doctor as needed. Return to ER for worsening symptoms or if you develop any new or worrisome symptoms.

## 2023-09-25 NOTE — TELEPHONE ENCOUNTER
3 days, cough, sob, chest pain and tightness, fever 101-102. Not getting anything up with cough. Using nyquil. Home COVID test negative  x3. Please advise.

## 2023-09-26 ENCOUNTER — TELEPHONE (OUTPATIENT)
Dept: FAMILY MEDICINE CLINIC | Age: 53
End: 2023-09-26

## 2023-09-26 DIAGNOSIS — R63.4 UNINTENDED WEIGHT LOSS: ICD-10-CM

## 2023-09-26 DIAGNOSIS — R10.31 RLQ ABDOMINAL PAIN: Primary | ICD-10-CM

## 2023-09-26 LAB
EKG ATRIAL RATE: 73 BPM
EKG P AXIS: 67 DEGREES
EKG P-R INTERVAL: 138 MS
EKG Q-T INTERVAL: 398 MS
EKG QRS DURATION: 140 MS
EKG QTC CALCULATION (BAZETT): 438 MS
EKG R AXIS: 72 DEGREES
EKG T AXIS: 30 DEGREES
EKG VENTRICULAR RATE: 73 BPM

## 2023-09-26 PROCEDURE — 93010 ELECTROCARDIOGRAM REPORT: CPT | Performed by: INTERNAL MEDICINE

## 2023-09-26 NOTE — TELEPHONE ENCOUNTER
Advised pt of provider's notes, voiced understanding       Morenita Tao ok with having a CT completed   Pt stated she is feeling a little better today thank you for asking.

## 2023-09-26 NOTE — TELEPHONE ENCOUNTER
----- Message from Leslie Guaman DO sent at 9/26/2023  9:49 AM EDT -----  Blood work overall looks okay. TSH has been low off and on for a while and still is but the other thyroid numbers are normal so I do not think this is relevant. Since it might take a little while to get her in for her colonoscopy, I recommend that we do a CT of her abdomen and pelvis. Please see if she is agreeable. Please also see how she is doing in terms of her cough and tell her I am glad she went to the ER yesterday.

## 2023-09-29 ENCOUNTER — TELEPHONE (OUTPATIENT)
Dept: FAMILY MEDICINE CLINIC | Age: 53
End: 2023-09-29

## 2023-09-29 RX ORDER — AZITHROMYCIN 250 MG/1
TABLET, FILM COATED ORAL
Qty: 6 TABLET | Refills: 0 | Status: SHIPPED | OUTPATIENT
Start: 2023-09-29

## 2023-09-29 RX ORDER — PREDNISONE 20 MG/1
40 TABLET ORAL DAILY
Qty: 10 TABLET | Refills: 0 | Status: SHIPPED | OUTPATIENT
Start: 2023-09-29 | End: 2023-10-04

## 2023-09-29 NOTE — TELEPHONE ENCOUNTER
Scripts sent for steroid and atb. Use inhaler. If fever needs ER. Monitor pulse ox too - needs to stay above 90.

## 2023-09-29 NOTE — TELEPHONE ENCOUNTER
Advised pt of provider's notes, voiced understanding         Pt stated she woke up today with a lump in her left axillary area. She stated it id very painful and about the size of a small marble.  Please advise

## 2023-09-29 NOTE — TELEPHONE ENCOUNTER
Pt called, she was unable to talk for coughing and being SOB, pt did stated she has not improved at all.  Advided pt to go to the ER and pt wanted me to talk to PCP first, please advise

## 2023-10-02 ENCOUNTER — OFFICE VISIT (OUTPATIENT)
Dept: FAMILY MEDICINE CLINIC | Age: 53
End: 2023-10-02
Payer: COMMERCIAL

## 2023-10-02 ENCOUNTER — HOSPITAL ENCOUNTER (OUTPATIENT)
Dept: CT IMAGING | Age: 53
Discharge: HOME | End: 2023-10-04
Attending: FAMILY MEDICINE
Payer: COMMERCIAL

## 2023-10-02 VITALS — OXYGEN SATURATION: 97 % | SYSTOLIC BLOOD PRESSURE: 110 MMHG | DIASTOLIC BLOOD PRESSURE: 70 MMHG | HEART RATE: 90 BPM

## 2023-10-02 DIAGNOSIS — E04.1 THYROID NODULE: ICD-10-CM

## 2023-10-02 DIAGNOSIS — R10.31 RLQ ABDOMINAL PAIN: ICD-10-CM

## 2023-10-02 DIAGNOSIS — R50.9 ACUTE FEBRILE ILLNESS: ICD-10-CM

## 2023-10-02 DIAGNOSIS — R22.9 LUMP OF SKIN: Primary | ICD-10-CM

## 2023-10-02 DIAGNOSIS — R79.89 DECREASED THYROID STIMULATING HORMONE (TSH) LEVEL: ICD-10-CM

## 2023-10-02 DIAGNOSIS — R63.4 UNINTENDED WEIGHT LOSS: ICD-10-CM

## 2023-10-02 PROCEDURE — 99214 OFFICE O/P EST MOD 30 MIN: CPT | Performed by: FAMILY MEDICINE

## 2023-10-02 PROCEDURE — 6360000004 HC RX CONTRAST MEDICATION: Performed by: FAMILY MEDICINE

## 2023-10-02 PROCEDURE — 74177 CT ABD & PELVIS W/CONTRAST: CPT

## 2023-10-02 RX ADMIN — IOHEXOL 20 ML: 240 INJECTION, SOLUTION INTRATHECAL; INTRAVASCULAR; INTRAVENOUS; ORAL at 14:02

## 2023-10-02 RX ADMIN — IOPAMIDOL 75 ML: 755 INJECTION, SOLUTION INTRAVENOUS at 14:02

## 2023-10-02 NOTE — PROGRESS NOTES
needed for Cough or Congestion 9/25/23  Yes Natalie Beyer, DO   polyethylene glycol [facility]) 17 GM/SCOOP powder Take 17 g by mouth daily 9/22/23  Yes Nate Jaimes DO   amphetamine-dextroamphetamine (ADDERALL, 10MG,) 10 MG tablet Take 1 tablet by mouth 2 times daily for 15 days. Max Daily Amount: 20 mg 9/22/23 10/7/23 Yes Nate Jaimes, DO   PARoxetine (PAXIL) 20 MG tablet Take 1 tablet by mouth daily 9/18/23  Yes Nate Jaimes, DO   QUEtiapine (SEROQUEL) 50 MG tablet TAKE 1 & 1/2 (ONE AND ONE-HALF) TABLETS BY MOUTH NIGHTLY 8/15/23  Yes Nate Jaimes, DO   LORazepam (ATIVAN) 1 MG tablet TAKE 1 TABLET BY MOUTH EVERY 8 HOURS AS NEEDED FOR ANXIETY 7/13/23 10/13/23 Yes Lauryn Nail, APRN - CNP   EPINEPHrine (EPIPEN 2-JOSE) 0.3 MG/0.3ML SOAJ injection Inject 0.3 mLs into the muscle once for 1 dose Use as directed for allergic reaction 2/13/23 10/2/23 Yes Nate Jaimes DO   ibuprofen (ADVIL;MOTRIN) 600 MG tablet Take 1 tablet by mouth 4 times daily as needed for Pain 10/7/20   Urszula Barba MD        Allergies:       Codeine, Celexa [citalopram], Effexor [venlafaxine], Trazodone and nefazodone, and Xanax [alprazolam]    Physical Exam:     Vitals:  /70   Pulse 90   LMP 04/05/2014   SpO2 97%   Physical Exam  Vitals and nursing note reviewed. Constitutional:       General: She is not in acute distress. Appearance: Normal appearance. She is well-developed. She is not ill-appearing. HENT:      Nose: Nose normal.      Mouth/Throat:      Mouth: Mucous membranes are moist.      Comments: Mild filmy buildup pablito buccal mucosa  Cardiovascular:      Rate and Rhythm: Normal rate and regular rhythm. Heart sounds: Normal heart sounds. Pulmonary:      Effort: Pulmonary effort is normal.      Breath sounds: Normal breath sounds. Lymphadenopathy:      Cervical: Cervical adenopathy (mild enlargement R>L pablito upper anterior cervical, tender) present.    Skin:     Comments: L axilla subcu

## 2023-10-11 DIAGNOSIS — F41.9 ANXIETY: ICD-10-CM

## 2023-10-12 RX ORDER — LORAZEPAM 1 MG/1
TABLET ORAL
Qty: 60 TABLET | Refills: 2 | Status: SHIPPED | OUTPATIENT
Start: 2023-10-12 | End: 2024-01-12

## 2024-01-26 DIAGNOSIS — F41.9 ANXIETY: ICD-10-CM

## 2024-01-26 RX ORDER — QUETIAPINE FUMARATE 50 MG/1
TABLET, FILM COATED ORAL
Qty: 135 TABLET | Refills: 1 | Status: SHIPPED | OUTPATIENT
Start: 2024-01-26

## 2024-01-26 RX ORDER — LORAZEPAM 1 MG/1
TABLET ORAL
Qty: 60 TABLET | Refills: 2 | Status: SHIPPED | OUTPATIENT
Start: 2024-01-26 | End: 2024-04-26

## 2024-01-26 NOTE — TELEPHONE ENCOUNTER
Last visit:  10/2/2023  Next Visit Date:  No future appointments.      Medication List:  Prior to Admission medications    Medication Sig Start Date End Date Taking? Authorizing Provider   azithromycin (ZITHROMAX) 250 MG tablet 500mg on day 1 followed by 250mg on days 2 - 5 9/29/23   Chinyere Salas DO   albuterol sulfate HFA (VENTOLIN HFA) 108 (90 Base) MCG/ACT inhaler Inhale 2 puffs into the lungs every 6 hours as needed for Wheezing 9/25/23   Warner Quiroz,    brompheniramine-pseudoephedrine-DM 2-30-10 MG/5ML syrup Take 2.5 mLs by mouth 4 times daily as needed for Cough or Congestion 9/25/23   Warner Quiroz DO   polyethylene glycol (GLYCOLAX) 17 GM/SCOOP powder Take 17 g by mouth daily 9/22/23   Chinyere Salas DO   amphetamine-dextroamphetamine (ADDERALL, 10MG,) 10 MG tablet Take 1 tablet by mouth 2 times daily for 15 days. Max Daily Amount: 20 mg 9/22/23 10/7/23  Chinyere Salas DO   PARoxetine (PAXIL) 20 MG tablet Take 1 tablet by mouth daily 9/18/23   Chinyere Salas DO   QUEtiapine (SEROQUEL) 50 MG tablet TAKE 1 & 1/2 (ONE AND ONE-HALF) TABLETS BY MOUTH NIGHTLY 8/15/23   Chinyere Salas DO   EPINEPHrine (EPIPEN 2-JOSE) 0.3 MG/0.3ML SOAJ injection Inject 0.3 mLs into the muscle once for 1 dose Use as directed for allergic reaction 2/13/23 10/2/23  Chinyere Salas DO   ibuprofen (ADVIL;MOTRIN) 600 MG tablet Take 1 tablet by mouth 4 times daily as needed for Pain 10/7/20   Rubi Garcia MD

## 2024-02-07 ENCOUNTER — HOSPITAL ENCOUNTER (EMERGENCY)
Age: 54
Discharge: LEFT BEFORE DISCHARGE | End: 2024-02-07

## 2024-02-07 VITALS
TEMPERATURE: 97.5 F | DIASTOLIC BLOOD PRESSURE: 89 MMHG | SYSTOLIC BLOOD PRESSURE: 159 MMHG | HEART RATE: 87 BPM | OXYGEN SATURATION: 98 % | RESPIRATION RATE: 16 BRPM

## 2024-02-07 ASSESSMENT — PAIN - FUNCTIONAL ASSESSMENT: PAIN_FUNCTIONAL_ASSESSMENT: 0-10

## 2024-02-07 ASSESSMENT — PAIN SCALES - GENERAL: PAINLEVEL_OUTOF10: 8

## 2024-04-16 DIAGNOSIS — F41.9 ANXIETY: ICD-10-CM

## 2024-04-16 RX ORDER — LORAZEPAM 1 MG/1
TABLET ORAL
Qty: 60 TABLET | Refills: 2 | Status: SHIPPED | OUTPATIENT
Start: 2024-04-16 | End: 2024-07-16

## 2024-04-16 NOTE — TELEPHONE ENCOUNTER
Last OV: 10/2/2023   02/13/23 anxiety   Last RX:    Next scheduled apt: Visit date not found          Surescript requesting a refill

## 2024-06-11 ENCOUNTER — OFFICE VISIT (OUTPATIENT)
Dept: FAMILY MEDICINE CLINIC | Age: 54
End: 2024-06-11
Payer: COMMERCIAL

## 2024-06-11 VITALS
SYSTOLIC BLOOD PRESSURE: 134 MMHG | HEIGHT: 66 IN | DIASTOLIC BLOOD PRESSURE: 82 MMHG | HEART RATE: 93 BPM | OXYGEN SATURATION: 99 % | BODY MASS INDEX: 23.46 KG/M2 | WEIGHT: 146 LBS

## 2024-06-11 DIAGNOSIS — G43.119 INTRACTABLE MIGRAINE WITH AURA WITHOUT STATUS MIGRAINOSUS: Primary | ICD-10-CM

## 2024-06-11 DIAGNOSIS — G44.209 TENSION HEADACHE: ICD-10-CM

## 2024-06-11 PROCEDURE — 96372 THER/PROPH/DIAG INJ SC/IM: CPT | Performed by: NURSE PRACTITIONER

## 2024-06-11 PROCEDURE — 99213 OFFICE O/P EST LOW 20 MIN: CPT | Performed by: NURSE PRACTITIONER

## 2024-06-11 RX ORDER — KETOROLAC TROMETHAMINE 30 MG/ML
30 INJECTION, SOLUTION INTRAMUSCULAR; INTRAVENOUS ONCE
Status: COMPLETED | OUTPATIENT
Start: 2024-06-11 | End: 2024-06-11

## 2024-06-11 RX ORDER — TIZANIDINE 4 MG/1
4 TABLET ORAL NIGHTLY PRN
Qty: 30 TABLET | Refills: 0 | Status: SHIPPED | OUTPATIENT
Start: 2024-06-11

## 2024-06-11 RX ORDER — RIMEGEPANT SULFATE 75 MG/75MG
75 TABLET, ORALLY DISINTEGRATING ORAL DAILY
Qty: 2 TABLET | Refills: 0 | Status: SHIPPED | COMMUNITY
Start: 2024-06-11

## 2024-06-11 RX ADMIN — KETOROLAC TROMETHAMINE 30 MG: 30 INJECTION, SOLUTION INTRAMUSCULAR; INTRAVENOUS at 17:20

## 2024-06-11 SDOH — ECONOMIC STABILITY: INCOME INSECURITY: HOW HARD IS IT FOR YOU TO PAY FOR THE VERY BASICS LIKE FOOD, HOUSING, MEDICAL CARE, AND HEATING?: NOT HARD AT ALL

## 2024-06-11 SDOH — ECONOMIC STABILITY: FOOD INSECURITY: WITHIN THE PAST 12 MONTHS, YOU WORRIED THAT YOUR FOOD WOULD RUN OUT BEFORE YOU GOT MONEY TO BUY MORE.: NEVER TRUE

## 2024-06-11 SDOH — ECONOMIC STABILITY: FOOD INSECURITY: WITHIN THE PAST 12 MONTHS, THE FOOD YOU BOUGHT JUST DIDN'T LAST AND YOU DIDN'T HAVE MONEY TO GET MORE.: NEVER TRUE

## 2024-06-11 ASSESSMENT — PATIENT HEALTH QUESTIONNAIRE - PHQ9
SUM OF ALL RESPONSES TO PHQ QUESTIONS 1-9: 0
SUM OF ALL RESPONSES TO PHQ QUESTIONS 1-9: 0
SUM OF ALL RESPONSES TO PHQ9 QUESTIONS 1 & 2: 0
2. FEELING DOWN, DEPRESSED OR HOPELESS: NOT AT ALL
SUM OF ALL RESPONSES TO PHQ QUESTIONS 1-9: 0
SUM OF ALL RESPONSES TO PHQ QUESTIONS 1-9: 0
1. LITTLE INTEREST OR PLEASURE IN DOING THINGS: NOT AT ALL

## 2024-06-11 ASSESSMENT — ENCOUNTER SYMPTOMS
VOMITING: 0
NAUSEA: 1
PHOTOPHOBIA: 1
SHORTNESS OF BREATH: 0
COUGH: 0
DIARRHEA: 0

## 2024-06-11 NOTE — PROGRESS NOTES
palpitations.   Gastrointestinal:  Positive for nausea. Negative for diarrhea and vomiting.   Neurological:  Negative for dizziness, seizures and headaches.         Physical Exam:     Vitals:  /82 (Site: Right Upper Arm, Position: Sitting)   Pulse 93   Ht 1.676 m (5' 6\")   Wt 66.2 kg (146 lb)   LMP 04/05/2014   SpO2 99%   BMI 23.57 kg/m²       Physical Exam  Vitals and nursing note reviewed.   Constitutional:       Appearance: Normal appearance. She is well-developed.   HENT:      Head: Normocephalic.   Eyes:      Pupils: Pupils are equal, round, and reactive to light.   Cardiovascular:      Rate and Rhythm: Normal rate and regular rhythm.      Heart sounds: Normal heart sounds, S1 normal and S2 normal.   Pulmonary:      Effort: Pulmonary effort is normal. No respiratory distress.      Breath sounds: Normal breath sounds.   Abdominal:      General: Bowel sounds are normal.      Palpations: Abdomen is soft.      Tenderness: There is no abdominal tenderness.   Musculoskeletal:      Right shoulder: Tenderness present.      Left shoulder: Tenderness present.      Comments: Bilat trapezius very TTP   Skin:     General: Skin is warm and dry.   Neurological:      Mental Status: She is alert and oriented to person, place, and time.      Cranial Nerves: Cranial nerves 2-12 are intact.               Data:     Lab Results   Component Value Date/Time     09/25/2023 05:57 PM    K 3.7 09/25/2023 05:57 PM     09/25/2023 05:57 PM    CO2 25 09/25/2023 05:57 PM    BUN 9 09/25/2023 05:57 PM    CREATININE 0.8 09/25/2023 05:57 PM    GLUCOSE 119 09/25/2023 05:57 PM    BILITOT 0.3 09/25/2023 05:57 PM    ALKPHOS 117 09/25/2023 05:57 PM    AST 17 09/25/2023 05:57 PM    ALT 11 09/25/2023 05:57 PM     Lab Results   Component Value Date/Time    WBC 8.1 09/25/2023 05:57 PM    RBC 4.92 09/25/2023 05:57 PM    HGB 14.3 09/25/2023 05:57 PM    HCT 42.4 09/25/2023 05:57 PM    MCV 86.2 09/25/2023 05:57 PM    MCH 29.1 09/25/2023

## 2024-06-20 RX ORDER — PAROXETINE HYDROCHLORIDE 20 MG/1
20 TABLET, FILM COATED ORAL DAILY
Qty: 90 TABLET | Refills: 1 | Status: SHIPPED | OUTPATIENT
Start: 2024-06-20

## 2024-06-20 NOTE — TELEPHONE ENCOUNTER
Last OV: 6/11/2024 migraine   02/13/23 anxiety   Last RX:    Next scheduled apt: Visit date not found          Surescript requesting a refill   Medication pending for approval

## 2024-08-05 RX ORDER — QUETIAPINE FUMARATE 50 MG/1
TABLET, FILM COATED ORAL
Qty: 135 TABLET | Refills: 1 | Status: SHIPPED | OUTPATIENT
Start: 2024-08-05

## 2024-08-05 NOTE — TELEPHONE ENCOUNTER
Last OV: 6/11/2024  migraine   Last RX:    Next scheduled apt: Visit date not found          Surescript requesting a refill   Medication pending for approval

## 2024-08-08 DIAGNOSIS — F41.9 ANXIETY: ICD-10-CM

## 2024-08-08 RX ORDER — LORAZEPAM 1 MG/1
TABLET ORAL
Qty: 60 TABLET | Refills: 2 | Status: SHIPPED | OUTPATIENT
Start: 2024-08-08 | End: 2024-11-08

## 2024-08-08 NOTE — TELEPHONE ENCOUNTER
Last OV 6/11/24      Next OV not scheduled    Requesting refill on lorazepam thru surescripts.  Rx pending

## 2024-08-10 ENCOUNTER — APPOINTMENT (OUTPATIENT)
Dept: GENERAL RADIOLOGY | Age: 54
End: 2024-08-10
Payer: COMMERCIAL

## 2024-08-10 ENCOUNTER — HOSPITAL ENCOUNTER (EMERGENCY)
Age: 54
Discharge: HOME | End: 2024-08-10
Attending: FAMILY MEDICINE
Payer: COMMERCIAL

## 2024-08-10 VITALS
BODY MASS INDEX: 23.14 KG/M2 | DIASTOLIC BLOOD PRESSURE: 89 MMHG | SYSTOLIC BLOOD PRESSURE: 142 MMHG | OXYGEN SATURATION: 100 % | TEMPERATURE: 97.8 F | RESPIRATION RATE: 20 BRPM | HEIGHT: 66 IN | WEIGHT: 144 LBS | HEART RATE: 89 BPM

## 2024-08-10 DIAGNOSIS — Z98.890 HISTORY OF SURGERY ON RIGHT WRIST: ICD-10-CM

## 2024-08-10 DIAGNOSIS — S63.501A SPRAIN OF RIGHT WRIST, INITIAL ENCOUNTER: Primary | ICD-10-CM

## 2024-08-10 PROCEDURE — 6370000000 HC RX 637 (ALT 250 FOR IP): Performed by: FAMILY MEDICINE

## 2024-08-10 PROCEDURE — 73130 X-RAY EXAM OF HAND: CPT

## 2024-08-10 PROCEDURE — 73110 X-RAY EXAM OF WRIST: CPT

## 2024-08-10 PROCEDURE — 99283 EMERGENCY DEPT VISIT LOW MDM: CPT

## 2024-08-10 RX ORDER — IBUPROFEN 600 MG/1
600 TABLET ORAL EVERY 6 HOURS PRN
Qty: 30 TABLET | Refills: 0 | Status: SHIPPED | OUTPATIENT
Start: 2024-08-10

## 2024-08-10 RX ORDER — OXYCODONE HYDROCHLORIDE AND ACETAMINOPHEN 5; 325 MG/1; MG/1
1 TABLET ORAL EVERY 6 HOURS PRN
Qty: 12 TABLET | Refills: 0 | Status: SHIPPED | OUTPATIENT
Start: 2024-08-10 | End: 2024-08-13

## 2024-08-10 RX ORDER — OXYCODONE HYDROCHLORIDE AND ACETAMINOPHEN 5; 325 MG/1; MG/1
1 TABLET ORAL ONCE
Status: COMPLETED | OUTPATIENT
Start: 2024-08-10 | End: 2024-08-10

## 2024-08-10 RX ADMIN — OXYCODONE HYDROCHLORIDE AND ACETAMINOPHEN 1 TABLET: 5; 325 TABLET ORAL at 18:34

## 2024-08-10 ASSESSMENT — PAIN DESCRIPTION - DESCRIPTORS
DESCRIPTORS: SHARP;SHOOTING
DESCRIPTORS: ACHING;PRESSURE;SHARP

## 2024-08-10 ASSESSMENT — PAIN DESCRIPTION - ORIENTATION
ORIENTATION: RIGHT
ORIENTATION: RIGHT

## 2024-08-10 ASSESSMENT — PAIN DESCRIPTION - LOCATION
LOCATION: WRIST
LOCATION: WRIST

## 2024-08-10 ASSESSMENT — PAIN DESCRIPTION - PAIN TYPE: TYPE: ACUTE PAIN

## 2024-08-10 ASSESSMENT — LIFESTYLE VARIABLES
HOW MANY STANDARD DRINKS CONTAINING ALCOHOL DO YOU HAVE ON A TYPICAL DAY: 1 OR 2
HOW OFTEN DO YOU HAVE A DRINK CONTAINING ALCOHOL: 2-4 TIMES A MONTH

## 2024-08-10 ASSESSMENT — PAIN SCALES - GENERAL
PAINLEVEL_OUTOF10: 8
PAINLEVEL_OUTOF10: 6

## 2024-08-10 ASSESSMENT — PAIN DESCRIPTION - FREQUENCY: FREQUENCY: CONTINUOUS

## 2024-08-10 ASSESSMENT — PAIN - FUNCTIONAL ASSESSMENT: PAIN_FUNCTIONAL_ASSESSMENT: 0-10

## 2024-08-10 NOTE — ED PROVIDER NOTES
[facility]  EMERGENCY DEPARTMENT ENCOUNTER      Pt Name: Alberta Menjivar  MRN: [id number]  Birthdate 1970  Date of evaluation: 8/10/2024  Provider: Nhan Miramontes MD    CHIEF COMPLAINT       Chief Complaint   Patient presents with    Wrist Injury     Pt here for R wrist injury. 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 Pt states it is tingling and painful.          HISTORY OF PRESENT ILLNESS      Alberta Menjivar is a 54 y.o. female who presents to the emergency department via private vehicle, patient planing of fall proxy 1 hour prior to arrival, states it slipped and went on her buttocks and right wrist, with immediate pain in her right wrist area, worse with any movement.  Patient describes as tingling and painful.  Rates the pain 8 out of 10.  Denies other injury.    PCP: Josue  Ortho: [facility]        REVIEW OF SYSTEMS       Review of Systems   All other systems reviewed and are negative.        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 Garcia @ [facility]--Medical therapy    LAPAROSCOPY      WRIST FRACTURE SURGERY Right 02/05/2021    per Dr. Salmeron    WRIST FRACTURE SURGERY Right 2/5/2021    WRIST OPEN REDUCTION INTERNAL FIXATION-DISTAL RADIUS performed by Aditya Salmeron MD at [facility] OR         CURRENT MEDICATIONS       Discharge Medication List as of 8/10/2024  7:01 PM        CONTINUE these medications which have NOT CHANGED    Details   LORazepam (ATIVAN) 1 MG tablet TAKE 1 TABLET BY MOUTH EVERY 8 HOURS prn anxiety, Disp-60 tablet, R-2Normal      QUEtiapine (SEROQUEL) 50 MG tablet TAKE 1 & 1/2 (ONE AND ONE-HALF) TABLETS BY MOUTH NIGHTLY, Disp-135 tablet, R-1Normal      PARoxetine (PAXIL) 20 MG tablet TAKE 1 TABLET BY MOUTH  clinically significant/life threatening deterioration in the patient's condition which required my urgent intervention.      PROCEDURES:  Unless otherwise noted below, none     Procedures    FINAL IMPRESSION      1. Sprain of right wrist, initial encounter    2. History of surgery on right wrist          DISPOSITION/PLAN     DISPOSITION Decision To Discharge 08/10/2024 07:24:12 PM      PATIENT REFERRED TO:  Aditya Salmeron MD  [address] [facility] [address]  [phone]    Call   Orthopaedics    Chinyere Salas DO  [address] [facility] [id number] [phone]    Call   As needed    [facility] ED  [address] [facility] [address]  [phone]    As needed, If symptoms worsen      DISCHARGE MEDICATIONS:  Discharge Medication List as of 8/10/2024  7:01 PM        START taking these medications    Details   oxyCODONE-acetaminophen (PERCOCET) 5-325 MG per tablet Take 1 tablet by mouth every 6 hours as needed for Pain for up to 3 days. Intended supply: 3 days. Take lowest dose possible to manage pain Max Daily Amount: 4 tablets, Disp-12 tablet, R-0Normal      !! ibuprofen (IBU) 600 MG tablet Take 1 tablet by mouth every 6 hours as needed for Pain, Disp-30 tablet, R-0Normal       !! - Potential duplicate medications found. Please discuss with provider.          (Please note that portions of this note were completed with a voice recognition program.  Efforts were made to edit the dictations but occasionally words are mis-transcribed.)    Nhan Miramontes MD (electronically signed)  Attending Emergency Physician           Nhan Miramontes MD  08/10/24 1944

## 2024-11-20 DIAGNOSIS — F41.9 ANXIETY: ICD-10-CM

## 2024-11-20 NOTE — TELEPHONE ENCOUNTER
Ativan 1 mg    Dm-carly      .  Health Maintenance   Topic Date Due    Pneumococcal 0-64 years Vaccine (1 of 2 - PCV) Never done    Hepatitis C screen  Never done    Hepatitis B vaccine (1 of 3 - 19+ 3-dose series) Never done    Cervical cancer screen  Never done    Shingles vaccine (1 of 2) Never done    Lung Cancer Screening &/or Counseling  08/17/2023    Flu vaccine (1) 08/01/2024    COVID-19 Vaccine (4 - 2023-24 season) 09/01/2024    Breast cancer screen  02/23/2025    Depression Screen  06/11/2025    Lipids  03/07/2028    DTaP/Tdap/Td vaccine (2 - Td or Tdap) 10/07/2030    Colorectal Cancer Screen  10/23/2033    HIV screen  Completed    Hepatitis A vaccine  Aged Out    Hib vaccine  Aged Out    Polio vaccine  Aged Out    Meningococcal (ACWY) vaccine  Aged Out             (applicable per patient's age: Cancer Screenings, Depression Screening, Fall Risk Screening, Immunizations)    Hemoglobin A1C (%)   Date Value   03/07/2023 5.0     AST (U/L)   Date Value   09/25/2023 17     ALT (U/L)   Date Value   09/25/2023 11     BUN (mg/dL)   Date Value   09/25/2023 9      (goal A1C is < 7)   (goal LDL is <100) need 30-50% reduction from baseline     BP Readings from Last 3 Encounters:   08/10/24 (!) 142/89   06/11/24 134/82   02/07/24 (!) 159/89    (goal /80)      All Future Testing planned in [facility]:  Lab Frequency Next Occurrence       Next Visit Date:  No future appointments.         Patient Active Problem List:     OCD (obsessive compulsive disorder)     Anxiety     Primary fibromyalgia syndrome     Current smoker

## 2024-11-21 RX ORDER — LORAZEPAM 1 MG/1
TABLET ORAL
Qty: 60 TABLET | Refills: 2 | Status: SHIPPED | OUTPATIENT
Start: 2024-11-21 | End: 2025-02-21

## 2024-11-21 NOTE — TELEPHONE ENCOUNTER
Last visit:  6/11/2024  Next Visit Date:  No future appointments.      Medication List:  Prior to Admission medications    Medication Sig Start Date End Date Taking? Authorizing Provider   ibuprofen (IBU) 600 MG tablet Take 1 tablet by mouth every 6 hours as needed for Pain 8/10/24   Nhan Miramontes MD   QUEtiapine (SEROQUEL) 50 MG tablet TAKE 1 & 1/2 (ONE AND ONE-HALF) TABLETS BY MOUTH NIGHTLY 8/5/24   Chinyere Salas DO   PARoxetine (PAXIL) 20 MG tablet TAKE 1 TABLET BY MOUTH DAILY 6/20/24   Chinyere Salas DO   tiZANidine (ZANAFLEX) 4 MG tablet Take 1 tablet by mouth nightly as needed (muscle pain) 6/11/24   Kenroy He DNP   rimegepant sulfate (NURTEC) 75 MG TBDP Take 75 mg by mouth daily Dispense from samples  Lot # [id number]  Exp- 05/2026 6/11/24   Kenroy He DNP   albuterol sulfate HFA (VENTOLIN HFA) 108 (90 Base) MCG/ACT inhaler Inhale 2 puffs into the lungs every 6 hours as needed for Wheezing 9/25/23   Warner Quiroz DO   EPINEPHrine (EPIPEN 2-JOSE) 0.3 MG/0.3ML SOAJ injection Inject 0.3 mLs into the muscle once for 1 dose Use as directed for allergic reaction 2/13/23 10/2/23  Chinyere Salas DO   ibuprofen (ADVIL;MOTRIN) 600 MG tablet Take 1 tablet by mouth 4 times daily as needed for Pain 10/7/20   Rubi Garcia MD

## 2025-02-19 DIAGNOSIS — F41.9 ANXIETY: ICD-10-CM

## 2025-02-19 RX ORDER — LORAZEPAM 1 MG/1
TABLET ORAL
Qty: 60 TABLET | Refills: 0 | Status: SHIPPED | OUTPATIENT
Start: 2025-02-19 | End: 2025-05-19

## 2025-02-28 ENCOUNTER — HOSPITAL ENCOUNTER (OUTPATIENT)
Age: 55
Discharge: HOME | End: 2025-02-28
Payer: COMMERCIAL

## 2025-02-28 LAB
ANION GAP SERPL CALCULATED.3IONS-SCNC: 14 MMOL/L (ref 9–17)
BASOPHILS # BLD: 0.06 K/UL (ref 0–0.2)
BASOPHILS NFR BLD: 1 % (ref 0–2)
BUN SERPL-MCNC: 11 MG/DL (ref 6–20)
CALCIUM SERPL-MCNC: 9.2 MG/DL (ref 8.6–10.4)
CHLORIDE SERPL-SCNC: 102 MMOL/L (ref 98–107)
CO2 SERPL-SCNC: 24 MMOL/L (ref 20–31)
CREAT SERPL-MCNC: 0.8 MG/DL (ref 0.5–0.9)
EOSINOPHIL # BLD: 0.3 K/UL (ref 0–0.4)
EOSINOPHILS RELATIVE PERCENT: 4 % (ref 0–5)
ERYTHROCYTE [DISTWIDTH] IN BLOOD BY AUTOMATED COUNT: 12 % (ref 12.1–15.2)
GFR, ESTIMATED: 88 ML/MIN/1.73M2
GLUCOSE SERPL-MCNC: 111 MG/DL (ref 70–99)
HCT VFR BLD AUTO: 41.9 % (ref 36–46)
HGB BLD-MCNC: 14.2 G/DL (ref 12–16)
IMM GRANULOCYTES # BLD AUTO: 0.01 K/UL (ref 0–0.3)
IMM GRANULOCYTES NFR BLD: 0 % (ref 0–5)
LYMPHOCYTES NFR BLD: 2.59 K/UL (ref 1–4.8)
LYMPHOCYTES RELATIVE PERCENT: 32 % (ref 15–40)
MCH RBC QN AUTO: 29.6 PG (ref 26–34)
MCHC RBC AUTO-ENTMCNC: 33.9 G/DL (ref 31–37)
MCV RBC AUTO: 87.5 FL (ref 80–100)
MONOCYTES NFR BLD: 0.34 K/UL (ref 0–1)
MONOCYTES NFR BLD: 4 % (ref 4–8)
NEUTROPHILS NFR BLD: 59 % (ref 47–75)
NEUTS SEG NFR BLD: 4.92 K/UL (ref 2.5–7)
PLATELET # BLD AUTO: 353 K/UL (ref 140–450)
PMV BLD AUTO: 8.6 FL (ref 6–12)
POTASSIUM SERPL-SCNC: 4.2 MMOL/L (ref 3.7–5.3)
RBC # BLD AUTO: 4.79 M/UL (ref 4–5.2)
SODIUM SERPL-SCNC: 140 MMOL/L (ref 135–144)
WBC OTHER # BLD: 8.2 K/UL (ref 3.5–11)

## 2025-02-28 PROCEDURE — 85025 COMPLETE CBC W/AUTO DIFF WBC: CPT

## 2025-02-28 PROCEDURE — 36415 COLL VENOUS BLD VENIPUNCTURE: CPT

## 2025-02-28 PROCEDURE — 93005 ELECTROCARDIOGRAM TRACING: CPT | Performed by: PODIATRIST

## 2025-02-28 PROCEDURE — 80048 BASIC METABOLIC PNL TOTAL CA: CPT

## 2025-03-01 LAB
EKG ATRIAL RATE: 71 BPM
EKG P AXIS: 82 DEGREES
EKG P-R INTERVAL: 140 MS
EKG Q-T INTERVAL: 410 MS
EKG QRS DURATION: 138 MS
EKG QTC CALCULATION (BAZETT): 445 MS
EKG R AXIS: 94 DEGREES
EKG T AXIS: 39 DEGREES
EKG VENTRICULAR RATE: 71 BPM

## 2025-03-03 ENCOUNTER — OFFICE VISIT (OUTPATIENT)
Dept: FAMILY MEDICINE CLINIC | Age: 55
End: 2025-03-03
Payer: COMMERCIAL

## 2025-03-03 VITALS
BODY MASS INDEX: 24.43 KG/M2 | HEIGHT: 66 IN | OXYGEN SATURATION: 98 % | DIASTOLIC BLOOD PRESSURE: 80 MMHG | SYSTOLIC BLOOD PRESSURE: 120 MMHG | HEART RATE: 86 BPM | WEIGHT: 152 LBS

## 2025-03-03 DIAGNOSIS — Z12.31 VISIT FOR SCREENING MAMMOGRAM: ICD-10-CM

## 2025-03-03 DIAGNOSIS — F41.8 SITUATIONAL ANXIETY: ICD-10-CM

## 2025-03-03 DIAGNOSIS — M79.672 CHRONIC FOOT PAIN, LEFT: ICD-10-CM

## 2025-03-03 DIAGNOSIS — Z01.818 PREOPERATIVE CLEARANCE: Primary | ICD-10-CM

## 2025-03-03 DIAGNOSIS — G89.29 CHRONIC FOOT PAIN, LEFT: ICD-10-CM

## 2025-03-03 PROCEDURE — 99214 OFFICE O/P EST MOD 30 MIN: CPT | Performed by: FAMILY MEDICINE

## 2025-03-03 RX ORDER — CLONAZEPAM 0.5 MG/1
TABLET ORAL
Qty: 30 TABLET | Refills: 0 | Status: SHIPPED | OUTPATIENT
Start: 2025-03-03 | End: 2025-04-03

## 2025-03-03 RX ORDER — PAROXETINE 20 MG/1
20 TABLET, FILM COATED ORAL DAILY
Qty: 90 TABLET | Refills: 1 | Status: SHIPPED | OUTPATIENT
Start: 2025-03-03

## 2025-03-03 SDOH — ECONOMIC STABILITY: FOOD INSECURITY: WITHIN THE PAST 12 MONTHS, YOU WORRIED THAT YOUR FOOD WOULD RUN OUT BEFORE YOU GOT MONEY TO BUY MORE.: NEVER TRUE

## 2025-03-03 SDOH — ECONOMIC STABILITY: FOOD INSECURITY: WITHIN THE PAST 12 MONTHS, THE FOOD YOU BOUGHT JUST DIDN'T LAST AND YOU DIDN'T HAVE MONEY TO GET MORE.: NEVER TRUE

## 2025-03-03 ASSESSMENT — PATIENT HEALTH QUESTIONNAIRE - PHQ9
SUM OF ALL RESPONSES TO PHQ QUESTIONS 1-9: 0
1. LITTLE INTEREST OR PLEASURE IN DOING THINGS: NOT AT ALL
SUM OF ALL RESPONSES TO PHQ QUESTIONS 1-9: 0
2. FEELING DOWN, DEPRESSED OR HOPELESS: NOT AT ALL

## 2025-03-03 NOTE — PROGRESS NOTES
Name: Alberta Menjivar  : 1970         Chief Complaint:     Chief Complaint   Patient presents with    Surgical Clearance     Dr. Fuentes Metatarsal deformity,Scheduled 3/6/25    Anxiety       History of Present Illness:      Alberta Menjivar is a 54 y.o.  female who presents with Surgical Clearance (Dr. Fuentes Metatarsal deformity,Scheduled 3/6/25) and Anxiety      HPI    Pt presents for preop clearance - chronic L foot pain, deformity with 4th and 5th toes, surgery 3/6. Has had previous procedures under anesthesia and recalls just one time she got a terrible HA 2d after a gyn laparoscopy but otherwise has done ok. Continues to have chronic chest pain which is positional in nature, never exertional. Still able to exercise, walking very fast, up stairs. Did take 2 aleve 2d ago for a HA. No nsaids past few days otherwise.    Chronic trouble with sleep, continues seroquel and ativan at nighttime and is getting up during the night in a sleep-wake state (says not sleepwalking) and going to kitchen, eating, one night fell.    Anxiety uncontrolled, has had trouble again for past couple mos with swallowing, inopportune times incl while driving, in a meeting. Set off by stress and then becomes more afraid of it which makes things even worse.     Medical History:     Patient Active Problem List   Diagnosis    OCD (obsessive compulsive disorder)    Anxiety    Primary fibromyalgia syndrome    Current smoker       Medications:       Prior to Admission medications    Medication Sig Start Date End Date Taking? Authorizing Provider   clonazePAM (KLONOPIN) 0.5 MG tablet 0.25-0.5 mg daily prn anxiety 3/3/25 4/3/25 Yes Chinyere Salas DO   LORazepam (ATIVAN) 1 MG tablet TAKE 1 TABLET BY MOUTH EVERY EIGHT HOURS AS NEEDED anxiety 25 Yes Chinyere Salas DO   ibuprofen (IBU) 600 MG tablet Take 1 tablet by mouth every 6 hours as needed for Pain 8/10/24  Yes Nhan Miramontes MD   QUEtiapine (SEROQUEL) 50 MG

## 2025-03-03 NOTE — PATIENT INSTRUCTIONS
[employer] SURVEY:    You may be receiving a survey from [employer] regarding your visit today.    You may get this in the mail, through your MyChart or in your email.     Please complete the survey to enable us to provide the highest quality of care to you and your family.    If you cannot score us as very good ( 5 Stars) on any question, please feel free to call the office to discuss how we could have made your experience exceptional.     Thank you.    Clinical Care Team:   DO Kody Morales CMA                                     Triage: Selma Tse CMA              Clerical Team:    Selma Reina     [place] [phone]           Billing questions: [phone]           Medical Records Request: [phone]

## 2025-03-03 NOTE — TELEPHONE ENCOUNTER
Last OV: 6/11/2024  migraine 02/13/23 ADHD, anxiety   Last RX:    Next scheduled apt: 3/3/2025  clearance           Surescript requesting a refill   Medication pending for approval

## 2025-03-27 RX ORDER — QUETIAPINE FUMARATE 50 MG/1
TABLET, FILM COATED ORAL
Qty: 135 TABLET | Refills: 1 | Status: SHIPPED | OUTPATIENT
Start: 2025-03-27

## 2025-03-27 NOTE — TELEPHONE ENCOUNTER
Last visit:  3/3/2025  Next Visit Date:  No future appointments.      Medication List:  Prior to Admission medications    Medication Sig Start Date End Date Taking? Authorizing Provider   PARoxetine (PAXIL) 20 MG tablet TAKE 1 TABLET BY MOUTH DAILY 3/3/25   Chinyere Salas DO   clonazePAM (KLONOPIN) 0.5 MG tablet 0.25-0.5 mg daily prn anxiety 3/3/25 4/3/25  Chinyere Salas DO   LORazepam (ATIVAN) 1 MG tablet TAKE 1 TABLET BY MOUTH EVERY EIGHT HOURS AS NEEDED anxiety 2/19/25 5/19/25  Chinyere Salas DO   ibuprofen (IBU) 600 MG tablet Take 1 tablet by mouth every 6 hours as needed for Pain 8/10/24   Nhan Miramontes MD   QUEtiapine (SEROQUEL) 50 MG tablet TAKE 1 & 1/2 (ONE AND ONE-HALF) TABLETS BY MOUTH NIGHTLY 8/5/24   Chinyere Salas DO   albuterol sulfate HFA (VENTOLIN HFA) 108 (90 Base) MCG/ACT inhaler Inhale 2 puffs into the lungs every 6 hours as needed for Wheezing 9/25/23   Warner Quiroz DO   EPINEPHrine (EPIPEN 2-JOSE) 0.3 MG/0.3ML SOAJ injection Inject 0.3 mLs into the muscle once for 1 dose Use as directed for allergic reaction 2/13/23 10/2/23  Chinyere Salas DO

## 2025-05-05 DIAGNOSIS — F41.8 SITUATIONAL ANXIETY: ICD-10-CM

## 2025-05-06 RX ORDER — CLONAZEPAM 0.5 MG/1
TABLET ORAL
Qty: 30 TABLET | Refills: 2 | Status: SHIPPED | OUTPATIENT
Start: 2025-05-06 | End: 2025-08-06

## 2025-05-21 ENCOUNTER — TELEPHONE (OUTPATIENT)
Dept: FAMILY MEDICINE CLINIC | Age: 55
End: 2025-05-21

## 2025-05-21 DIAGNOSIS — F41.9 ANXIETY: ICD-10-CM

## 2025-05-21 RX ORDER — LORAZEPAM 1 MG/1
1 TABLET ORAL EVERY 8 HOURS PRN
Qty: 60 TABLET | Refills: 2 | Status: SHIPPED | OUTPATIENT
Start: 2025-05-21 | End: 2025-08-19

## 2025-05-21 NOTE — TELEPHONE ENCOUNTER
[facility] at [facility] called to verify if patient is to be taking Ativan 1 mg and Clonazepam 0.5 mg.  Please let [facility] know.    Health Maintenance   Topic Date Due    Hepatitis C screen  Never done    Hepatitis B vaccine (1 of 3 - 19+ 3-dose series) Never done    Pneumococcal 50+ years Vaccine (1 of 2 - PCV) Never done    Cervical cancer screen  Never done    Shingles vaccine (1 of 2) Never done    Lung Cancer Screening &/or Counseling  08/17/2023    COVID-19 Vaccine (4 - 2024-25 season) 09/01/2024    Breast cancer screen  02/23/2025    Flu vaccine (Season Ended) 08/01/2025    Depression Screen  03/03/2026    Lipids  03/07/2028    DTaP/Tdap/Td vaccine (2 - Td or Tdap) 10/07/2030    Colorectal Cancer Screen  10/23/2033    HIV screen  Completed    Hepatitis A vaccine  Aged Out    Hib vaccine  Aged Out    Polio vaccine  Aged Out    Meningococcal (ACWY) vaccine  Aged Out    Meningococcal B vaccine  Aged Out             (applicable per patient's age: Cancer Screenings, Depression Screening, Fall Risk Screening, Immunizations)    Hemoglobin A1C (%)   Date Value   03/07/2023 5.0     AST (U/L)   Date Value   09/25/2023 17     ALT (U/L)   Date Value   09/25/2023 11     BUN (mg/dL)   Date Value   02/28/2025 11      (goal A1C is < 7)   (goal LDL is <100) need 30-50% reduction from baseline     BP Readings from Last 3 Encounters:   03/03/25 120/80   08/10/24 (!) 142/89   06/11/24 134/82    (goal /80)      All Future Testing planned in [facility]:  Lab Frequency Next Occurrence   TANI EDNA DIGITAL SCREEN BILATERAL Once 03/03/2025       Next Visit Date:  No future appointments.         Patient Active Problem List:     OCD (obsessive compulsive disorder)     Anxiety     Primary fibromyalgia syndrome     Current smoker

## 2025-05-21 NOTE — TELEPHONE ENCOUNTER
Last OV: 3/3/2025 situational anxiety   Last RX:    Next scheduled apt: Visit date not found           Surescript requesting a refill   Medication pending for approval    What is her pulse? She can decrease coreg 25 mg to 1/2 tab (12.5 mg) BID and see how she does..

## 2025-05-22 NOTE — TELEPHONE ENCOUNTER
Spoke with Moustapha Sanchez advised   Yes, uses  klonopin prn during the day and ativan for sleep. Has used in the past also and tolerated.

## 2025-05-22 NOTE — TELEPHONE ENCOUNTER
Yes, uses  klonopin prn during the day and ativan for sleep. Has used in the past also and tolerated.

## 2025-06-20 ENCOUNTER — TELEPHONE (OUTPATIENT)
Dept: FAMILY MEDICINE CLINIC | Age: 55
End: 2025-06-20

## 2025-06-20 ENCOUNTER — HOSPITAL ENCOUNTER (EMERGENCY)
Age: 55
Discharge: HOME | End: 2025-06-20
Attending: FAMILY MEDICINE
Payer: COMMERCIAL

## 2025-06-20 ENCOUNTER — APPOINTMENT (OUTPATIENT)
Dept: GENERAL RADIOLOGY | Age: 55
End: 2025-06-20
Payer: COMMERCIAL

## 2025-06-20 VITALS
RESPIRATION RATE: 21 BRPM | HEIGHT: 66 IN | HEART RATE: 69 BPM | BODY MASS INDEX: 23.87 KG/M2 | TEMPERATURE: 98.2 F | SYSTOLIC BLOOD PRESSURE: 106 MMHG | WEIGHT: 148.5 LBS | OXYGEN SATURATION: 96 % | DIASTOLIC BLOOD PRESSURE: 67 MMHG

## 2025-06-20 DIAGNOSIS — R07.9 CHEST PAIN, UNSPECIFIED TYPE: Primary | ICD-10-CM

## 2025-06-20 LAB
ANION GAP SERPL CALCULATED.3IONS-SCNC: 10 MMOL/L (ref 9–17)
BASOPHILS # BLD: 0.01 K/UL (ref 0–0.2)
BASOPHILS NFR BLD: 0 % (ref 0–2)
BUN SERPL-MCNC: 14 MG/DL (ref 6–20)
CALCIUM SERPL-MCNC: 9.2 MG/DL (ref 8.6–10.4)
CHLORIDE SERPL-SCNC: 107 MMOL/L (ref 98–107)
CO2 SERPL-SCNC: 23 MMOL/L (ref 20–31)
CREAT SERPL-MCNC: 0.8 MG/DL (ref 0.5–0.9)
D DIMER PPP FEU-MCNC: 0.53 UG/ML FEU (ref 0–0.59)
EOSINOPHIL # BLD: 0.17 K/UL (ref 0–0.4)
EOSINOPHILS RELATIVE PERCENT: 2 % (ref 0–5)
ERYTHROCYTE [DISTWIDTH] IN BLOOD BY AUTOMATED COUNT: 11.9 % (ref 12.1–15.2)
GFR, ESTIMATED: 88 ML/MIN/1.73M2
GLUCOSE SERPL-MCNC: 85 MG/DL (ref 70–99)
HCT VFR BLD AUTO: 42.5 % (ref 36–46)
HGB BLD-MCNC: 14.3 G/DL (ref 12–16)
IMM GRANULOCYTES # BLD AUTO: 0.01 K/UL (ref 0–0.3)
IMM GRANULOCYTES NFR BLD: 0 % (ref 0–5)
LYMPHOCYTES NFR BLD: 2.72 K/UL (ref 1–4.8)
LYMPHOCYTES RELATIVE PERCENT: 39 % (ref 15–40)
MCH RBC QN AUTO: 29.4 PG (ref 26–34)
MCHC RBC AUTO-ENTMCNC: 33.6 G/DL (ref 31–37)
MCV RBC AUTO: 87.4 FL (ref 80–100)
MONOCYTES NFR BLD: 0.33 K/UL (ref 0–1)
MONOCYTES NFR BLD: 5 % (ref 4–8)
NEUTROPHILS NFR BLD: 54 % (ref 47–75)
NEUTS SEG NFR BLD: 3.81 K/UL (ref 2.5–7)
PLATELET # BLD AUTO: 335 K/UL (ref 140–450)
PMV BLD AUTO: 9 FL (ref 6–12)
POTASSIUM SERPL-SCNC: 4 MMOL/L (ref 3.7–5.3)
RBC # BLD AUTO: 4.86 M/UL (ref 4–5.2)
SODIUM SERPL-SCNC: 140 MMOL/L (ref 135–144)
TROPONIN I SERPL HS-MCNC: <6 NG/L (ref 0–14)
TROPONIN I SERPL HS-MCNC: <6 NG/L (ref 0–14)
TSH SERPL DL<=0.05 MIU/L-ACNC: 0.31 UIU/ML (ref 0.27–4.2)
WBC OTHER # BLD: 7.1 K/UL (ref 3.5–11)

## 2025-06-20 PROCEDURE — 80048 BASIC METABOLIC PNL TOTAL CA: CPT

## 2025-06-20 PROCEDURE — 85025 COMPLETE CBC W/AUTO DIFF WBC: CPT

## 2025-06-20 PROCEDURE — 6370000000 HC RX 637 (ALT 250 FOR IP): Performed by: FAMILY MEDICINE

## 2025-06-20 PROCEDURE — 85379 FIBRIN DEGRADATION QUANT: CPT

## 2025-06-20 PROCEDURE — 99285 EMERGENCY DEPT VISIT HI MDM: CPT

## 2025-06-20 PROCEDURE — 84484 ASSAY OF TROPONIN QUANT: CPT

## 2025-06-20 PROCEDURE — 84443 ASSAY THYROID STIM HORMONE: CPT

## 2025-06-20 PROCEDURE — 36415 COLL VENOUS BLD VENIPUNCTURE: CPT

## 2025-06-20 PROCEDURE — 93005 ELECTROCARDIOGRAM TRACING: CPT | Performed by: FAMILY MEDICINE

## 2025-06-20 PROCEDURE — 71045 X-RAY EXAM CHEST 1 VIEW: CPT

## 2025-06-20 RX ORDER — ASPIRIN 81 MG/1
324 TABLET, CHEWABLE ORAL ONCE
Status: COMPLETED | OUTPATIENT
Start: 2025-06-20 | End: 2025-06-20

## 2025-06-20 RX ADMIN — ASPIRIN 324 MG: 81 TABLET, CHEWABLE ORAL at 12:42

## 2025-06-20 ASSESSMENT — PAIN DESCRIPTION - PAIN TYPE: TYPE: ACUTE PAIN

## 2025-06-20 ASSESSMENT — PAIN SCALES - GENERAL
PAINLEVEL_OUTOF10: 1
PAINLEVEL_OUTOF10: 1

## 2025-06-20 ASSESSMENT — HEART SCORE: ECG: NORMAL

## 2025-06-20 ASSESSMENT — PAIN DESCRIPTION - LOCATION
LOCATION: CHEST
LOCATION: CHEST

## 2025-06-20 ASSESSMENT — PAIN DESCRIPTION - DESCRIPTORS
DESCRIPTORS: PRESSURE
DESCRIPTORS: PRESSURE

## 2025-06-20 ASSESSMENT — PAIN DESCRIPTION - ORIENTATION
ORIENTATION: LEFT
ORIENTATION: LEFT

## 2025-06-20 ASSESSMENT — PAIN DESCRIPTION - FREQUENCY: FREQUENCY: INTERMITTENT

## 2025-06-20 ASSESSMENT — ENCOUNTER SYMPTOMS
CHEST TIGHTNESS: 1
SHORTNESS OF BREATH: 0

## 2025-06-20 ASSESSMENT — LIFESTYLE VARIABLES
HOW MANY STANDARD DRINKS CONTAINING ALCOHOL DO YOU HAVE ON A TYPICAL DAY: PATIENT DOES NOT DRINK
HOW OFTEN DO YOU HAVE A DRINK CONTAINING ALCOHOL: NEVER

## 2025-06-20 ASSESSMENT — PAIN - FUNCTIONAL ASSESSMENT: PAIN_FUNCTIONAL_ASSESSMENT: 0-10

## 2025-06-20 NOTE — ED PROVIDER NOTES
[facility]  EMERGENCY DEPARTMENT ENCOUNTER      Pt Name: Alberta Menjivar  MRN: [id number]  Birthdate 1970  Date of evaluation: 6/20/2025  Provider: Nhan Miramontes MD    CHIEF COMPLAINT       Chief Complaint   Patient presents with    Chest Pain     Chest pain last night which went across the chest and into shoulders. Pt states she called PCP for a visit and was told to come to the ER. Pt with minor pressure at this time as she states right over her heart.          HISTORY OF PRESENT ILLNESS      Alberta Menjivar is a 54 y.o. female who presents to the emergency department via private vehicle, patient if chest pain is provide last few months stating \"sometimes I feel like it is actual pain in my heart, like sharp pain\", states without shortness of breath, however last night began having chest pain that was described as both pressure and pain going across her shoulders and chest,, and currently has a little discomfort in her left chest.  Denies any nausea denies a diaphoresis.  Patient does states she has had a stress test or heart cath in the past, states she does have a known bundle branch block \"on the good side of the heart\".  Denies any history of blood clots or known family for blood clots.  States daughter also has a bundle branch block but is on the bedside the heart she will likely need a pacer at some point.  Patient does smoke, denies any drug use including sympathomimetics such as cocaine or methamphetamines.  Denies any swelling or leg or pain in the calves.  Denies trauma or falls.    PCP: Josue  Cards: Radha        REVIEW OF SYSTEMS       Review of Systems   Respiratory:  Positive for chest tightness. Negative for shortness of breath.    Cardiovascular:  Positive for chest pain. Negative for leg swelling.   All other systems reviewed and are negative.        PAST MEDICAL HISTORY     Past Medical History:   Diagnosis Date    Anxiety          SURGICAL HISTORY       Past Surgical  Left message for Katia to return our phone call. Port is being placed on 1/20/21 arrive at 930am.   Reviewed: N/A    Imaging that is independently reviewed and interpreted by me as: As above    See more data below for the lab and radiology tests and orders.    3)  Treatment and Disposition    Patient repeat assessment:  As above    Disposition discussion with patient/family: Discharge    Case discussed with consulting clinician:  As above    Social determinants of health impacting treatment or disposition: N/A    Shared Decision Making: As above    Code Status Discussion: N/A      REASSESSMENT     As above      CRITICAL CARE TIME     Total Critical Care time was 0 minutes, excluding separately reportable procedures.  There was a high probability of clinically significant/life threatening deterioration in the patient's condition which required my urgent intervention.      PROCEDURES:  Unless otherwise noted below, none     Procedures    FINAL IMPRESSION      1. Chest pain, unspecified type          DISPOSITION/PLAN     DISPOSITION Decision To Discharge 06/20/2025 02:18:18 PM   DISPOSITION CONDITION Stable           PATIENT REFERRED TO:  Tez Garcia MD  [address] [address]  [phone]      Cardiology    Chinyere Salas DO  [address] [id number] [phone]          [facility]  Emergency Department  [address] [address]  [phone]    As needed, If symptoms worsen      DISCHARGE MEDICATIONS:  Current Discharge Medication List          (Please note that portions of this note were completed with a voice recognition program.  Efforts were made to edit the dictations but occasionally words are mis-transcribed.)    Nhan Miramontes MD (electronically signed)  Attending Emergency Physician           Nhan Miramontes MD  06/20/25 1415       Nhan Miramontes MD  06/20/25 [id number]

## 2025-06-20 NOTE — TELEPHONE ENCOUNTER
Pt called stating she has been having chest pain for several Months. Last night she was having chest pain and pain between her shoulder blades, with chest pressure. Pt denied any symptoms today but stated she felt different. Advised  pt to go to the ER for an evaluation. -LINDSAY

## 2025-06-20 NOTE — ED NOTES
Discharge instructions printed and reviewed with pt. No questions regarding discharge. Pt ambulated with steady gait out of the ED.

## 2025-06-20 NOTE — ED NOTES
Pt cardiac hx with BBB. Chest last night without exertion. Pain radiated across chest and into both shoulders. Pt currently left side chest pressure and says minimal. Rates 1/10. Denies SOB. Pt with a cough for last couple weeks and believed it to be seasonal allergies. Placed in gown. Call light placed in bed. No other needs at this time.

## 2025-06-21 LAB
EKG ATRIAL RATE: 59 BPM
EKG ATRIAL RATE: 77 BPM
EKG P AXIS: 62 DEGREES
EKG P AXIS: 77 DEGREES
EKG P-R INTERVAL: 138 MS
EKG P-R INTERVAL: 142 MS
EKG Q-T INTERVAL: 388 MS
EKG Q-T INTERVAL: 430 MS
EKG QRS DURATION: 144 MS
EKG QRS DURATION: 146 MS
EKG QTC CALCULATION (BAZETT): 425 MS
EKG QTC CALCULATION (BAZETT): 439 MS
EKG R AXIS: 100 DEGREES
EKG R AXIS: 101 DEGREES
EKG T AXIS: 19 DEGREES
EKG T AXIS: 42 DEGREES
EKG VENTRICULAR RATE: 59 BPM
EKG VENTRICULAR RATE: 77 BPM

## 2025-06-21 PROCEDURE — 93010 ELECTROCARDIOGRAM REPORT: CPT | Performed by: INTERNAL MEDICINE

## 2025-07-30 ENCOUNTER — OFFICE VISIT (OUTPATIENT)
Dept: CARDIOLOGY CLINIC | Age: 55
End: 2025-07-30
Payer: COMMERCIAL

## 2025-07-30 VITALS
WEIGHT: 145 LBS | HEART RATE: 74 BPM | BODY MASS INDEX: 23.4 KG/M2 | OXYGEN SATURATION: 97 % | DIASTOLIC BLOOD PRESSURE: 77 MMHG | SYSTOLIC BLOOD PRESSURE: 112 MMHG

## 2025-07-30 DIAGNOSIS — R07.9 CHEST PAIN, UNSPECIFIED TYPE: Primary | ICD-10-CM

## 2025-07-30 DIAGNOSIS — Z82.49 FAMILY HISTORY OF PREMATURE CORONARY ARTERY DISEASE: ICD-10-CM

## 2025-07-30 DIAGNOSIS — E78.5 HYPERLIPIDEMIA, UNSPECIFIED HYPERLIPIDEMIA TYPE: ICD-10-CM

## 2025-07-30 DIAGNOSIS — F17.200 CURRENT SMOKER: ICD-10-CM

## 2025-07-30 PROCEDURE — 99204 OFFICE O/P NEW MOD 45 MIN: CPT | Performed by: INTERNAL MEDICINE

## 2025-07-30 NOTE — PROGRESS NOTES
Ov DR Phan   Past year c/o chest tightness  On and off last 5 min to 1 hr.  A month ago worse radiate  Up neck and down lt arm  Took asa then went to ED   on 6/20  Subsided when reached ED  Feels stress makes it worse.  No so  Having cold sweats for past   Year also.  No edema   No dizziness.    Smokes 1/2 ppd    Will do lipid    See in 3 mths  
motion abnormalities.  Overall these results are most consistent with a low risk scan.  Depending on the patient's symptoms and level of clinical suspicion,  aggressive medical management vs.  additional testing by coronary  angiography may be indicated.  Although the patient's results were not completely normal, unless  clinical suspicion for significant ongoing coronary artery ischemia is  high, I would not suggest pursuing additional testing by coronary  angiography.The results of this test were discussed with Dr. Garcia on 06/18/2020  at 1720.  MALAIKA GREENWOOD      IMPRESSION:  Chest pain  ED visit 6/20/25- unremarkable work up with normal HsTn despite prolonged discomfort and no ECG changes  Remote work up for CP in 2020:  -Mildly abnormal treadmill Myoview stress test 6/19/20, with anterior wall ischemia, with a low to intermediate risk of coronary artery disease.  -Normal LHC 8/25/20  - CT of chest 12/13/2020: No PE, coronary or cardiac abnormalities noted    Episode of left jaw pain with garbled speech, lasting approximately 30 to 45 minutes in 6/2020  Normal carotid duplex 7/2020    Smoker  Cessation advised and discussed continue to    Family history of coronary artery disease.    Hyperlipidemia.  Last lipid panel on chart 3/7/2023:  Total cholesterol 200  HDL 56   (151)  Triglycerides 138  Recheck lipid profile    PLAN:  1.  Recheck lipid profile  2.  Follow-up in 3 months  3.  [place] routine exercise 30 minutes a day at least 5 days a week    DISCUSSION:  Mrs. Menjivar has done relatively well over the last 5 years since we last saw her however he has some chest tightness occurring intermittently that is nonexertional.  This seems to be related more to emotional stress and any other triggers.  She did have a prolonged episode from the night before through the morning of her ED visit and her cardiac markers were normal as well as her EKG.  This suggests that the symptoms are less likely cardiac in

## (undated) DEVICE — SUTURE ETHLN SZ 3-0 L18IN NONABSORBABLE BLK L24MM PS-1 3/8 1663G

## (undated) DEVICE — TOWEL SURG SM W12XL18IN CLR PLAS TEAR RESIST REINF ADH FRST

## (undated) DEVICE — GAUZE,SPONGE,4"X4",16PLY,XRAY,STRL,LF: Brand: MEDLINE

## (undated) DEVICE — SOLUTION IV IRRIG POUR BRL 0.9% SODIUM CHL 2F7124

## (undated) DEVICE — SPONGE LAP W18XL18IN WHT COT 4 PLY FLD STRUNG RADPQ DISP ST

## (undated) DEVICE — SUTURE VCRL SZ 3-0 L27IN ABSRB UD L26MM SH 1/2 CIR J416H

## (undated) DEVICE — BANDAGE COMPR W4INXL9FT EXSANG SGL LAYERED NO CLSR ESMARCH

## (undated) DEVICE — SYRINGE MED 10ML LUERLOCK TIP W/O SFTY DISP

## (undated) DEVICE — INTENDED FOR TISSUE SEPARATION, AND OTHER PROCEDURES THAT REQUIRE A SHARP SURGICAL BLADE TO PUNCTURE OR CUT.: Brand: BARD-PARKER ® CARBON RIB-BACK BLADES

## (undated) DEVICE — DRAPE C ARM CLP FOR GE 9600 9800

## (undated) DEVICE — GOWN,AURORA,NONRNF,XL,30/CS: Brand: MEDLINE

## (undated) DEVICE — THREE-QUARTER SHEET: Brand: CONVERTORS

## (undated) DEVICE — TUBING, SUCTION, 9/32" X 12', STRAIGHT: Brand: MEDLINE INDUSTRIES, INC.

## (undated) DEVICE — PADDING,UNDERCAST,COTTON, 3X4YD STERILE: Brand: MEDLINE

## (undated) DEVICE — GLOVE SURG SZ 75 L12IN FNGR THK87MIL WHT LTX FREE

## (undated) DEVICE — BIT DRL L110MM DIA1.8MM QUIK CPL CALIB W/O STP REUSE

## (undated) DEVICE — SPONGE GZ W4XL4IN COT 12 PLY TYP VII WVN C FLD DSGN

## (undated) DEVICE — BIT DRL L100MM DIA2MM QUIK CPL W/O STP REUSE

## (undated) DEVICE — COVER,MAYO STAND,STERILE: Brand: MEDLINE

## (undated) DEVICE — GLOVE SURG SZ 75 L12IN FNGR THK87MIL DK GRN LTX FREE ISOLEX

## (undated) DEVICE — SUTURE VCRL SZ 0 L36IN ABSRB UD L36MM CT-1 1/2 CIR J946H

## (undated) DEVICE — HAND AND FT PK

## (undated) DEVICE — K WIRE FIX L150MM DIA1.25MM S STL TRCR PNT
Type: IMPLANTABLE DEVICE | Site: WRIST | Status: NON-FUNCTIONAL
Removed: 2021-02-05

## (undated) DEVICE — DRESSING PETRO W3XL3IN OIL EMUL N ADH GZ KNIT IMPREG CELOS

## (undated) DEVICE — NEEDLE HYPO 22GA L1.5IN BLK S STL HUB POLYPR SHLD REG BVL

## (undated) DEVICE — YANKAUER,FLEXIBLE HANDLE,REGLR CAPACITY: Brand: MEDLINE INDUSTRIES, INC.

## (undated) DEVICE — Device